# Patient Record
Sex: FEMALE | Race: WHITE | NOT HISPANIC OR LATINO | Employment: OTHER | URBAN - METROPOLITAN AREA
[De-identification: names, ages, dates, MRNs, and addresses within clinical notes are randomized per-mention and may not be internally consistent; named-entity substitution may affect disease eponyms.]

---

## 2017-02-22 ENCOUNTER — TRANSFERRED RECORDS (OUTPATIENT)
Dept: HEALTH INFORMATION MANAGEMENT | Facility: CLINIC | Age: 67
End: 2017-02-22

## 2017-12-01 ENCOUNTER — TELEPHONE (OUTPATIENT)
Dept: PEDIATRICS | Facility: CLINIC | Age: 67
End: 2017-12-01

## 2017-12-01 NOTE — TELEPHONE ENCOUNTER
Reason for Call:  Other pt would like Dr Huang as a PCP    Detailed comments: pt  is currently a pt caty [place] and the wife would like to also see her. She has surgery coming up and needs a pre-op.    Phone Number Patient can be reached at: Home number on file [phone] (home)    Best Time: any    Can we leave a detailed message on this number? YES    Call taken on 12/1/2017 at 3:12 PM by Mckenna Cummins

## 2017-12-04 NOTE — TELEPHONE ENCOUNTER
Patient's  see's  for primary care, has not seen . Spoke to pt and informed her, pt verbalized understanding and requested to schedule pre op with open provider. Appt scheduled.     Shayla Conley MA

## 2017-12-05 ENCOUNTER — OFFICE VISIT (OUTPATIENT)
Dept: PEDIATRICS | Facility: CLINIC | Age: 67
End: 2017-12-05
Payer: COMMERCIAL

## 2017-12-05 VITALS
TEMPERATURE: 98.6 F | SYSTOLIC BLOOD PRESSURE: 102 MMHG | HEART RATE: 58 BPM | DIASTOLIC BLOOD PRESSURE: 64 MMHG | BODY MASS INDEX: 23.44 KG/M2 | OXYGEN SATURATION: 99 % | WEIGHT: 140.7 LBS | HEIGHT: 65 IN

## 2017-12-05 DIAGNOSIS — Z01.818 PREOP GENERAL PHYSICAL EXAM: Primary | ICD-10-CM

## 2017-12-05 DIAGNOSIS — C50.919 MALIGNANT NEOPLASM OF FEMALE BREAST, UNSPECIFIED ESTROGEN RECEPTOR STATUS, UNSPECIFIED LATERALITY, UNSPECIFIED SITE OF BREAST (H): ICD-10-CM

## 2017-12-05 DIAGNOSIS — H35.341 MACULAR HOLE OF RIGHT EYE: ICD-10-CM

## 2017-12-05 DIAGNOSIS — I15.9 SECONDARY HYPERTENSION: ICD-10-CM

## 2017-12-05 PROCEDURE — 99214 OFFICE O/P EST MOD 30 MIN: CPT | Performed by: NURSE PRACTITIONER

## 2017-12-05 NOTE — MR AVS SNAPSHOT
After Visit Summary   12/5/2017    Mily Hilliard    MRN: [id number]           Patient Information     Date Of Birth          1950        Visit Information        Provider Department      12/5/2017 7:40 AM Mee Crouch APRN CNP [facility]        Today's Diagnoses     Preop general physical exam    -  1    Malignant neoplasm of female breast, unspecified estrogen receptor status, unspecified laterality, unspecified site of breast (H)        Macular hole of right eye        Secondary hypertension          Care Instructions    -Take only half of the lisinopril medicine day of surgery  -Continue propranolol (whole dose) day of surgery  -Avoid ibuprofen for 1 week before surgery.   Before Your Surgery      Call your surgeon if there is any change in your health. This includes signs of a cold or flu (such as a sore throat, runny nose, cough, rash or fever).    Do not smoke, drink alcohol or take over the counter medicine (unless your surgeon or primary care doctor tells you to) for the 24 hours before and after surgery.    If you take prescribed drugs: Follow your doctor s orders about which medicines to take and which to stop until after surgery.    Eating and drinking prior to surgery: follow the instructions from your surgeon    Take a shower or bath the night before surgery. Use the soap your surgeon gave you to gently clean your skin. If you do not have soap from your surgeon, use your regular soap. Do not shave or scrub the surgery site.  Wear clean pajamas and have clean sheets on your bed.           Follow-ups after your visit        Who to contact     If you have questions or need follow up information about today's clinic visit or your schedule please contact [facility] directly at [phone].  Normal or non-critical lab and imaging results will be communicated to you by MyChart, letter or phone within 4 business days after the clinic has received the  "results. If you do not hear from us within 7 days, please contact the clinic through [employer] or phone. If you have a critical or abnormal lab result, we will notify you by phone as soon as possible.  Submit refill requests through [employer] or call your pharmacy and they will forward the refill request to us. Please allow 3 business days for your refill to be completed.          Additional Information About Your Visit        [employer] Information     [employer] lets you send messages to your doctor, view your test results, renew your prescriptions, schedule appointments and more. To sign up, go to [email or web address] . Click on \"Log in\" on the left side of the screen, which will take you to the Welcome page. Then click on \"Sign up Now\" on the right side of the page.     You will be asked to enter the access code listed below, as well as some personal information. Please follow the directions to create your username and password.     Your access code is: [email or web address]  Expires: 3/5/2018  8:33 AM     Your access code will  in 90 days. If you need help or a new code, please call your [place] clinic or [phone].        Care EveryWhere ID     This is your Care EveryWhere ID. This could be used by other organizations to access your [place] medical records  [id number]        Your Vitals Were     Pulse Temperature Height Pulse Oximetry BMI (Body Mass Index)       58 98.6  F (37  C) (Tympanic) 5' 4.75\" (1.645 m) 99% 23.59 kg/m2        Blood Pressure from Last 3 Encounters:   17 102/64   14 139/65   13 178/82    Weight from Last 3 Encounters:   17 140 lb 11.2 oz (63.8 kg)   14 140 lb (63.5 kg)   13 150 lb (68 kg)              Today, you had the following     No orders found for display         Today's Medication Changes          These changes are accurate as of: 17  8:33 AM.  If you have any questions, ask your nurse or doctor.               These medicines have changed or " have updated prescriptions.        Dose/Directions    SYNTHROID PO   This may have changed:  Another medication with the same name was removed. Continue taking this medication, and follow the directions you see here.   Changed by:  Mee Crouch APRN CNP        Dose:  112 mcg   Take 112 mcg by mouth daily   Refills:  0                Primary Care Provider Office Phone # Fax #    Heather Mackey [phone]       [facility] [address]  [facility] [address]        Equal Access to Services     MUNDO ESPINOZA : Hadii aad ku hadasho Soomaali, waaxda luqadaha, qaybta kaalmada adeegyada, waxay idiin hayaan adeeg kharash lajulia . So [facility] [phone].    ATENCIÓN: Si habla español, tiene a lugo disposición servicios gratuitos de asistencia lingüística. [facility] [phone].    We comply with applicable federal civil rights laws and [place] laws. We do not discriminate on the basis of race, color, national origin, age, disability, sex, sexual orientation, or gender identity.            Thank you!     Thank you for choosing [facility]  for your care. Our goal is always to provide you with excellent care. Hearing back from our patients is one way we can continue to improve our services. Please take a few minutes to complete the written survey that you may receive in the mail after your visit with us. Thank you!             Your Updated Medication List - Protect others around you: Learn how to safely use, store and throw away your medicines at www.disposemymeds.org.          This list is accurate as of: 12/5/17  8:33 AM.  Always use your most recent med list.                   Brand Name Dispense Instructions for use Diagnosis    lisinopril-hydrochlorothiazide 10-12.5 MG per tablet    PRINZIDE/ZESTORETIC     Take 1 tablet by mouth daily 10-12.5mg        MULTIVITAMIN PO           PROPRANOLOL HCL PO      Take 60 mg by mouth daily        SYNTHROID PO      Take 112 mcg by mouth daily

## 2017-12-05 NOTE — NURSING NOTE
"Chief Complaint   Patient presents with     Pre-Op Exam       Initial /64  Pulse 58  Temp 98.6  F (37  C) (Tympanic)  Ht 5' 4.75\" (1.645 m)  Wt 140 lb 11.2 oz (63.8 kg)  SpO2 99%  BMI 23.59 kg/m2 Estimated body mass index is 23.59 kg/(m^2) as calculated from the following:    Height as of this encounter: 5' 4.75\" (1.645 m).    Weight as of this encounter: 140 lb 11.2 oz (63.8 kg).  Medication Reconciliation: complete   Yael Rodriguez CMA    "

## 2017-12-05 NOTE — PROGRESS NOTES
[facility]  [address]  [address]  [place] [address]  [phone]  Dept: [phone]    PRE-OP EVALUATION:  Today's date: 2017    Mily Hilliard (: 1950) presents for pre-operative evaluation assessment as requested by Dr. Buzz Rojas.  She requires evaluation and anesthesia risk assessment prior to undergoing surgery/procedure for treatment of macular hole - right eye .  Proposed procedure: vitrectomy, membrane peel, ICG dye, right eye    Date of Surgery/ Procedure: 17  Time of Surgery/ Procedure: 10:00am  Hospital/Surgical Facility: [facility]  Fax number for surgical facility: [phone]  Primary Physician: Heather Mackey  Type of Anesthesia Anticipated: to be determined - local    Patient has a Health Care Directive or Living Will:  NO    Preop Questions 2017   1.  Do you have a history of heart attack, stroke, stent, bypass or surgery on an artery in the head, neck, heart or legs? No   2.  Do you ever have any pain or discomfort in your chest? No   3.  Do you have a history of  Heart Failure? No   4.   Are you troubled by shortness of breath when:  walking on a level surface, or up a slight hill, or at night? No   5.  Do you currently have a cold, bronchitis or other respiratory infection? No   6.  Do you have a cough, shortness of breath, or wheezing? No   7.  Do you sometimes get pains in the calves of your legs when you walk? No   8. Do you or anyone in your family have previous history of blood clots? No   9.  Do you or does anyone in your family have a serious bleeding problem such as prolonged bleeding following surgeries or cuts? No   10. Have you ever had problems with anemia or been told to take iron pills? No   11. Have you had any abnormal blood loss such as black, tarry or bloody stools, or abnormal vaginal bleeding? No   12. Have you ever had a blood transfusion? No   13. Have you or any of your relatives ever had  problems with anesthesia? No   14. Do you have sleep apnea, excessive snoring or daytime drowsiness? No   15. Do you have any prosthetic heart valves? No   16. Do you have prosthetic joints? No   17. Is there any chance that you may be pregnant? No           HPI:                                                      Brief HPI related to upcoming procedure: vitrectomy, membrane peel, ICG dye, right eye    Hypothyroidism: Well controlled  Hypertension: Well controlled on propranolol and prinzide  High cholesterol: Not on medicine    MEDICAL HISTORY:                                                    There are no active problems to display for this patient.     Past Medical History:   Diagnosis Date     Breast cancer (H) 2000    right breast     Hypercholesterolemia      Hypertension      Hypothyroidism      Past Surgical History:   Procedure Laterality Date     Bilateral Foot surgery  1976     COLONOSCOPY  12/16/2014    Dr. [facility]     LUMPECTOMY BREAST  2000    Right     Current Outpatient Prescriptions   Medication Sig Dispense Refill     Levothyroxine Sodium (SYNTHROID PO) Take 112 mcg by mouth daily       Multiple Vitamins-Minerals (MULTIVITAMIN PO)        lisinopril-hydrochlorothiazide (PRINZIDE,ZESTORETIC) 10-12.5 MG per tablet Take 1 tablet by mouth daily 10-12.5mg       PROPRANOLOL HCL PO Take 60 mg by mouth daily        [DISCONTINUED] LEVOTHYROXINE SODIUM PO Take  by mouth.       OTC products: none    Allergies   Allergen Reactions     No Known Allergies       Latex Allergy: NO    Social History   Substance Use Topics     Smoking status: Former Smoker     Quit date: 1/1/2003     Smokeless tobacco: Never Used     Alcohol use 7.0 oz/week     14 Standard drinks or equivalent per week      Comment: occ     History   Drug Use No       REVIEW OF SYSTEMS:                                                    Constitutional, neuro, ENT, endocrine, pulmonary, cardiac, gastrointestinal, genitourinary, musculoskeletal,  "integument and psychiatric systems are negative, except as otherwise noted.      EXAM:                                                    /64  Pulse 58  Temp 98.6  F (37  C) (Tympanic)  Ht 5' 4.75\" (1.645 m)  Wt 140 lb 11.2 oz (63.8 kg)  SpO2 99%  BMI 23.59 kg/m2    GENERAL APPEARANCE: healthy, alert and no distress     EYES: EOMI, PERRL     HENT: ear canals and TM's normal and nose and mouth without ulcers or lesions     NECK: no adenopathy, no asymmetry, masses, or scars and thyroid normal to palpation     RESP: lungs clear to auscultation - no rales, rhonchi or wheezes     CV: regular rates and rhythm, normal S1 S2, no S3 or S4 and no murmur, click or rub     ABDOMEN:  soft, nontender, no HSM or masses and bowel sounds normal     MS: extremities normal- no gross deformities noted, no evidence of inflammation in joints, FROM in all extremities.     SKIN: no suspicious lesions or rashes     NEURO: Normal strength and tone, sensory exam grossly normal, mentation intact and speech normal     PSYCH: mentation appears normal. and affect normal/bright     LYMPHATICS: No axillary, cervical, or supraclavicular nodes    DIAGNOSTICS:                                                      Unresulted Labs Ordered in the Past 30 Days of this Admission     No orders found from 10/6/2017 to 12/6/2017.          Recent Labs   Lab Test  05/09/13   1700   HGB  13.9   PLT  225   NA  139   POTASSIUM  4.2   CR  0.81        IMPRESSION:                                                    Reason for surgery/procedure: vitrectomy, membrane peel, ICG dye, right eye      The proposed surgical procedure is considered LOW risk.    REVISED CARDIAC RISK INDEX  The patient has the following serious cardiovascular risks for perioperative complications such as (MI, PE, VFib and 3  AV Block):  No serious cardiac risks    The patient has the following additional risks for perioperative complications:  No identified additional risks      " ICD-10-CM    1. Preop general physical exam Z01.818    2. Malignant neoplasm of female breast, unspecified estrogen receptor status, unspecified laterality, unspecified site of breast (H) C50.919    3. Macular hole of right eye H35.341    4. Secondary hypertension I15.9        RECOMMENDATIONS:                                                      --Patient is to take all scheduled medications on the day of surgery EXCEPT for modifications listed below:     APPROVAL GIVEN to proceed with proposed procedure, without further diagnostic evaluation       Signed Electronically by: PAOLA Zavala CNP    Copy of this evaluation report is provided to requesting physician.    [facility] Preop Guidelines

## 2017-12-05 NOTE — PATIENT INSTRUCTIONS
-Take only half of the lisinopril medicine day of surgery  -Continue propranolol (whole dose) day of surgery  -Avoid ibuprofen for 1 week before surgery.   Before Your Surgery      Call your surgeon if there is any change in your health. This includes signs of a cold or flu (such as a sore throat, runny nose, cough, rash or fever).    Do not smoke, drink alcohol or take over the counter medicine (unless your surgeon or primary care doctor tells you to) for the 24 hours before and after surgery.    If you take prescribed drugs: Follow your doctor s orders about which medicines to take and which to stop until after surgery.    Eating and drinking prior to surgery: follow the instructions from your surgeon    Take a shower or bath the night before surgery. Use the soap your surgeon gave you to gently clean your skin. If you do not have soap from your surgeon, use your regular soap. Do not shave or scrub the surgery site.  Wear clean pajamas and have clean sheets on your bed.

## 2018-01-08 ENCOUNTER — TELEPHONE (OUTPATIENT)
Dept: PEDIATRICS | Facility: CLINIC | Age: 68
End: 2018-01-08

## 2018-01-08 NOTE — TELEPHONE ENCOUNTER
Panel Management Review      Patient has the following on her problem list:     Hypertension   Last three blood pressure readings:  BP Readings from Last 3 Encounters:   12/05/17 102/64   12/16/14 139/65   05/09/13 178/82     Blood pressure: Passed    HTN Guidelines:  Age 18-59 BP range:  Less than 140/90  Age 60-85 with Diabetes:  Less than 140/90  Age 60-85 without Diabetes:  less than 150/90        Composite cancer screening  Chart review shows that this patient is due/due soon for the following Mammogram  Summary:    Patient is due/failing the following:   LDL, MAMMOGRAM and PHYSICAL    Action needed:   Patient needs office visit for physical, mammogram, cholesterol check.    Type of outreach:    Phone, spoke to patient.  Patient states last had mammo done [facility] in May 2017 - normal. Patient states PCP is [facility]. Goes there for physicals.    Questions for provider review:    None                                                                                   Yael Rodriguez CMA    Chart closed .

## 2018-03-05 ENCOUNTER — TRANSFERRED RECORDS (OUTPATIENT)
Dept: HEALTH INFORMATION MANAGEMENT | Facility: CLINIC | Age: 68
End: 2018-03-05

## 2018-03-26 ENCOUNTER — RADIANT APPOINTMENT (OUTPATIENT)
Dept: MAMMOGRAPHY | Facility: CLINIC | Age: 68
End: 2018-03-26
Attending: INTERNAL MEDICINE
Payer: COMMERCIAL

## 2018-03-26 DIAGNOSIS — Z12.31 VISIT FOR SCREENING MAMMOGRAM: ICD-10-CM

## 2018-03-26 PROCEDURE — 77067 SCR MAMMO BI INCL CAD: CPT | Mod: TC

## 2018-05-23 ENCOUNTER — OFFICE VISIT (OUTPATIENT)
Dept: PEDIATRICS | Facility: CLINIC | Age: 68
End: 2018-05-23
Payer: COMMERCIAL

## 2018-05-23 VITALS
DIASTOLIC BLOOD PRESSURE: 82 MMHG | TEMPERATURE: 98.4 F | HEIGHT: 65 IN | SYSTOLIC BLOOD PRESSURE: 138 MMHG | BODY MASS INDEX: 23.51 KG/M2 | HEART RATE: 56 BPM | WEIGHT: 141.1 LBS

## 2018-05-23 DIAGNOSIS — R79.89 ELEVATED LIVER FUNCTION TESTS: ICD-10-CM

## 2018-05-23 DIAGNOSIS — E06.3 HYPOTHYROIDISM DUE TO HASHIMOTO'S THYROIDITIS: ICD-10-CM

## 2018-05-23 DIAGNOSIS — Z78.0 ASYMPTOMATIC POSTMENOPAUSAL STATUS: ICD-10-CM

## 2018-05-23 DIAGNOSIS — Z85.3 PERSONAL HISTORY OF MALIGNANT NEOPLASM OF BREAST: ICD-10-CM

## 2018-05-23 DIAGNOSIS — I10 ESSENTIAL HYPERTENSION: ICD-10-CM

## 2018-05-23 DIAGNOSIS — Z00.00 ROUTINE GENERAL MEDICAL EXAMINATION AT A HEALTH CARE FACILITY: Primary | ICD-10-CM

## 2018-05-23 PROCEDURE — 99213 OFFICE O/P EST LOW 20 MIN: CPT | Mod: 25 | Performed by: INTERNAL MEDICINE

## 2018-05-23 PROCEDURE — 84439 ASSAY OF FREE THYROXINE: CPT | Performed by: INTERNAL MEDICINE

## 2018-05-23 PROCEDURE — 99397 PER PM REEVAL EST PAT 65+ YR: CPT | Performed by: INTERNAL MEDICINE

## 2018-05-23 PROCEDURE — 36415 COLL VENOUS BLD VENIPUNCTURE: CPT | Performed by: INTERNAL MEDICINE

## 2018-05-23 PROCEDURE — 80053 COMPREHEN METABOLIC PANEL: CPT | Performed by: INTERNAL MEDICINE

## 2018-05-23 PROCEDURE — 84443 ASSAY THYROID STIM HORMONE: CPT | Performed by: INTERNAL MEDICINE

## 2018-05-23 RX ORDER — PROPRANOLOL HCL 60 MG
60 CAPSULE, EXTENDED RELEASE 24HR ORAL DAILY
Qty: 90 CAPSULE | Refills: 3 | Status: SHIPPED | OUTPATIENT
Start: 2018-05-23 | End: 2019-04-04

## 2018-05-23 RX ORDER — LISINOPRIL/HYDROCHLOROTHIAZIDE 10-12.5 MG
1 TABLET ORAL DAILY
Qty: 90 TABLET | Refills: 3 | Status: SHIPPED | OUTPATIENT
Start: 2018-05-23 | End: 2019-05-31

## 2018-05-23 ASSESSMENT — ACTIVITIES OF DAILY LIVING (ADL): CURRENT_FUNCTION: NO ASSISTANCE NEEDED

## 2018-05-23 NOTE — PROGRESS NOTES
SUBJECTIVE:   Mily Hilliard is a 67 year old female who presents for Preventive Visit.    Are you in the first 12 months of your Medicare coverage?  No    Physical   Annual:     Getting at least 3 servings of Calcium per day::  NO    Bi-annual eye exam::  Yes    Dental care twice a year::  Yes    Sleep apnea or symptoms of sleep apnea::  None    Diet::  Low salt and Low fat/cholesterol    Frequency of exercise::  6-7 days/week    Duration of exercise::  30-45 minutes    Taking medications regularly::  Yes    Medication side effects::  None    Additional concerns today::  No    Ability to successfully perform activities of daily living: no assistance needed  Home Safety:  No safety concerns identified  Hearing Impairment: no hearing concerns    Fall risk:  Fallen 2 or more times in the past year?: No  Any fall with injury in the past year?: No    COGNITIVE SCREEN  1) Repeat 3 items (Banana, Sunrise, Chair)    2) Clock draw: NORMAL  3) 3 item recall: Recalls 2 objects   Results: NORMAL clock, 1-2 items recalled: COGNITIVE IMPAIRMENT LESS LIKELY    Mini-CogTM Copyright RAFIA Mckinnon. Licensed by the author for use in [facility] [employer]; reprinted with permission (samuel@[facility]). All rights reserved.      Reviewed and updated as needed this visit by clinical staff  Tobacco  Allergies  Meds  Problems  Med Hx  Surg Hx  Fam Hx  Soc Hx          Reviewed and updated as needed this visit by Provider  Allergies  Meds  Problems        Social History   Substance Use Topics     Smoking status: Former Smoker     Quit date: 1/1/2003     Smokeless tobacco: Never Used     Alcohol use 7.0 oz/week     14 Standard drinks or equivalent per week      Comment: occ       No flowsheet data found.No flowsheet data found.    Hyperlipidemia Follow-Up      Rate your low fat/cholesterol diet?: good    Taking statin?  No    Other lipid medications/supplements?:  none    Hypertension Follow-up      Outpatient blood pressures are being  checked at home.  Results are 120-130/70.    Low Salt Diet: no added salt    Diagnosed within the last 10 years. Takes propranolol in the am and lisinopril/HCTZ at night. Up once a night to use the bathroom. No cough.     Hypothyroidism Follow-up    Since last visit, patient describes the following symptoms: Weight stable, no hair loss, no skin changes, no constipation, no loose stools    Diagnosed in 20's. Dose has been stable.     Osteopenia: takes 600 calcium once a day, 1000 units of vitamin D once a day. Tries to eat leafy green vegetables.     Today's PHQ-2 Score:   PHQ-2 ( 1999 Pfizer) 5/23/2018   Q1: Little interest or pleasure in doing things 0   Q2: Feeling down, depressed or hopeless 0   PHQ-2 Score 0     Do you feel safe in your environment - Yes    Do you have a Health Care Directive?: No: Advance care planning was reviewed with patient; patient declined at this time.    Current providers sharing in care for this patient include:   Patient Care Team:  Jessica Acevedo MD as PCP - General (Internal Medicine)    The following health maintenance items are reviewed in Epic and correct as of today:  Health Maintenance   Topic Date Due     HEPATITIS C SCREENING  06/25/1967     LIPID SCREEN Q5 YR FEMALE (SYSTEM ASSIGNED)  06/25/1994     ADVANCE DIRECTIVE PLANNING Q5 YRS  06/25/2004     FALL RISK ASSESSMENT  06/25/2014     DEXA SCAN SCREENING (SYSTEM ASSIGNED)  06/25/2014     PNEUMOCOCCAL (2 of 2 - PCV13) 06/01/2018     MAMMO Q1 YR  03/26/2019     COLONOSCOPY Q5 YR  12/16/2019     TETANUS IMMUNIZATION (SYSTEM ASSIGNED)  04/14/2023     INFLUENZA VACCINE  Completed     Patient Active Problem List   Diagnosis     Hypothyroidism     Hypertension     Hypercholesterolemia     Breast cancer (H)     Past Surgical History:   Procedure Laterality Date     Bilateral Foot surgery  1976     COLONOSCOPY  12/16/2014    Dr. Naylor [facility]     EYE SURGERY      cataract removal, macular hole repair     LUMPECTOMY BREAST   "2000    Right       Social History   Substance Use Topics     Smoking status: Former Smoker     Quit date: 1/1/2003     Smokeless tobacco: Never Used     Alcohol use 7.0 oz/week     14 Standard drinks or equivalent per week      Comment: occ     Family History   Problem Relation Age of Onset     Cancer - colorectal Mother      at age 94     Breast Cancer Mother      DIABETES Sister      pre-diabetic     Breast Cancer Sister      Macular Degeneration Father      Bladder Cancer Father      Stomach Cancer Maternal Grandfather            Review of Systems  Constitutional, HEENT, cardiovascular, pulmonary, GI, , musculoskeletal, neuro, skin, endocrine and psych systems are negative, except as otherwise noted.    OBJECTIVE:   /82  Pulse 56  Temp 98.4  F (36.9  C) (Tympanic)  Ht 5' 5\" (1.651 m)  Wt 141 lb 1.6 oz (64 kg)  BMI 23.48 kg/m2 Estimated body mass index is 23.48 kg/(m^2) as calculated from the following:    Height as of this encounter: 5' 5\" (1.651 m).    Weight as of this encounter: 141 lb 1.6 oz (64 kg).  Physical Exam  GENERAL: healthy, alert and no distress  EYES: Eyes grossly normal to inspection, PERRL and conjunctivae and sclerae normal  HENT: ear canals and TM's normal, nose and mouth without ulcers or lesions  NECK: no adenopathy, no asymmetry, masses, or scars and thyroid normal to palpation  RESP: lungs clear to auscultation - no rales, rhonchi or wheezes  BREAST: deferred to recent mammogram  CV: regular rate and rhythm, normal S1 S2, no S3 or S4, no murmur, click or rub, no peripheral edema and peripheral pulses strong  ABDOMEN: soft, nontender, no hepatosplenomegaly, no masses and bowel sounds normal  MS: no gross musculoskeletal defects noted, no edema  SKIN: no suspicious lesions or rashes  NEURO: Normal strength and tone, mentation intact and speech normal. Small tremor of left > right hand  PSYCH: mentation appears normal, affect normal/bright    Results for orders placed or " performed in visit on 05/23/18   TSH   Result Value Ref Range    TSH 0.12 (L) 0.40 - 4.00 mU/L   T4, free   Result Value Ref Range    T4 Free 1.30 0.76 - 1.46 ng/dL   Comprehensive metabolic panel   Result Value Ref Range    Sodium 137 133 - 144 mmol/L    Potassium 5.1 3.4 - 5.3 mmol/L    Chloride 103 94 - 109 mmol/L    Carbon Dioxide 23 20 - 32 mmol/L    Anion Gap 11 3 - 14 mmol/L    Glucose 91 70 - 99 mg/dL    Urea Nitrogen 15 7 - 30 mg/dL    Creatinine 0.80 0.52 - 1.04 mg/dL    GFR Estimate 71 >60 mL/min/1.7m2    GFR Estimate If Black 86 >60 mL/min/1.7m2    Calcium 9.5 8.5 - 10.1 mg/dL    Bilirubin Total 0.6 0.2 - 1.3 mg/dL    Albumin 4.0 3.4 - 5.0 g/dL    Protein Total 7.4 6.8 - 8.8 g/dL    Alkaline Phosphatase 51 40 - 150 U/L    ALT 53 (H) 0 - 50 U/L    AST 49 (H) 0 - 45 U/L       ASSESSMENT / PLAN:   1. Routine general medical examination at a health care facility  Mammo, colonoscopy UTD  Tdap, pneumovax UTD. Will obtain Prevnar after 6/1. Discussed Shingrix vaccine  Will schedule DEXA   Had [facility] with [facility] in the fall - will get records    2. Hypothyroidism due to Hashimoto's thyroiditis  TSH slightly off, but free T4 normal. Continue levothyroxine at 112 mcg a day. Recheck in 6 months.  - TSH  - T4, free  - TSH; Future  - T4, free; Future  - levothyroxine (SYNTHROID) 112 MCG tablet; Take 1 tablet (112 mcg) by mouth daily  Dispense: 90 tablet; Refill: 1    3. Essential hypertension  Controlled. Continue propranolol (also has essential tremor) and lisinopril/HCTZ  - Comprehensive metabolic panel  - propranolol (INDERAL LA) 60 MG 24 hr capsule; Take 1 capsule (60 mg) by mouth daily  Dispense: 90 capsule; Refill: 3  - lisinopril-hydrochlorothiazide (PRINZIDE/ZESTORETIC) 10-12.5 MG per tablet; Take 1 tablet by mouth daily 10-12.5mg  Dispense: 90 tablet; Refill: 3    4. Elevated liver function tests  Mildly elevated. Recheck in 6 months.  - Hepatic panel; Future    5. Personal history of malignant  "neoplasm of breast  Remote history. Continue yearly mammo.    6. Asymptomatic postmenopausal status  Getting adequate calcium/D. Recheck levels.  - DEXA HIP/PELVIS/SPINE - Future; Future    End of Life Planning:  Patient currently has an advanced directive: No.  I have verified the patient's ablity to prepare an advanced directive/make health care decisions.  Literature was provided to assist patient in preparing an advanced directive.    COUNSELING:  Reviewed preventive health counseling, as reflected in patient instructions  Special attention given to:       Regular exercise       Healthy diet/nutrition       Osteoporosis Prevention/Bone Health       Colon cancer screening       Hepatitis C screening        Estimated body mass index is 23.48 kg/(m^2) as calculated from the following:    Height as of this encounter: 5' 5\" (1.651 m).    Weight as of this encounter: 141 lb 1.6 oz (64 kg).     reports that she quit smoking about 15 years ago. She has never used smokeless tobacco.      Appropriate preventive services were discussed with this patient, including applicable screening as appropriate for cardiovascular disease, diabetes, osteopenia/osteoporosis, and glaucoma.  As appropriate for age/gender, discussed screening for colorectal cancer, prostate cancer, breast cancer, and cervical cancer. Checklist reviewing preventive services available has been given to the patient.    Reviewed patients plan of care and provided an AVS. The Basic Care Plan (routine screening as documented in Health Maintenance) for Mily meets the Care Plan requirement. This Care Plan has been established and reviewed with the Patient.    Counseling Resources:  ATP IV Guidelines  Pooled Cohorts Equation Calculator  Breast Cancer Risk Calculator  FRAX Risk Assessment  ICSI Preventive Guidelines  Dietary Guidelines for Americans, 2010  USDA's MyPlate  ASA Prophylaxis  Lung CA Screening    Jessica Acevedo MD  [facility] [place]  "

## 2018-05-23 NOTE — PATIENT INSTRUCTIONS
Preventive Health Recommendations    Female Ages 65 +    Yearly exam:     See your health care provider every year in order to  o Review health changes.   o Discuss preventive care.    o Review your medicines if your doctor has prescribed any.      You no longer need a yearly Pap test unless you've had an abnormal Pap test in the past 10 years. If you have vaginal symptoms, such as bleeding or discharge, be sure to talk with your provider about a Pap test.      Every 1 to 2 years, have a mammogram.  If you are over 69, talk with your health care provider about whether or not you want to continue having screening mammograms.      Every 10 years, have a colonoscopy. Or, have a yearly FIT test (stool test). These exams will check for colon cancer.       Have a cholesterol test every 5 years, or more often if your doctor advises it.       Have a diabetes test (fasting glucose) every three years. If you are at risk for diabetes, you should have this test more often.       At age 65, have a bone density scan (DEXA) to check for osteoporosis (brittle bone disease).    Shots:    Get a flu shot each year.    Get a tetanus shot every 10 years.    Talk to your doctor about your pneumonia vaccines. There are now two you should receive - Pneumovax (PPSV 23) and Prevnar (PCV 13).    Talk to your doctor about the shingles vaccine.    Talk to your doctor about the hepatitis B vaccine.    Nutrition:     Eat at least 5 servings of fruits and vegetables each day.      Eat whole-grain bread, whole-wheat pasta and brown rice instead of white grains and rice.      Talk to your provider about Calcium and Vitamin D.     Lifestyle    Exercise at least 150 minutes a week (30 minutes a day, 5 days a week). This will help you control your weight and prevent disease.      Limit alcohol to one drink per day.      No smoking.       Wear sunscreen to prevent skin cancer.       See your dentist twice a year for an exam and cleaning.      See your  eye doctor every 1 to 2 years to screen for conditions such as glaucoma, macular degeneration and cataracts.    ---------------  1. Labs today: thyroid function, electrolytes, liver and kidney function  2. Schedule bone density scan - [phone]  3. Prevnar pneumonia vaccine after 6/1 - can schedule as a nurse visit or could have done at the same time as the Shingrix vaccine  4. Refilled blood pressure medications, will send thyroid once labs are back  5. Will get records from [facility]  6. Follow-up in 1 year

## 2018-05-23 NOTE — MR AVS SNAPSHOT
After Visit Summary   5/23/2018    Mily Hilliard    MRN: [id number]           Patient Information     Date Of Birth          1950        Visit Information        Provider Department      5/23/2018 10:40 AM Jessica Acevedo MD [facility] [place]        Today's Diagnoses     Routine general medical examination at a health care facility    -  1    Hypothyroidism due to Hashimoto's thyroiditis        Essential hypertension        Asymptomatic postmenopausal status        Need for hepatitis C screening test        Need for prophylactic vaccination against Streptococcus pneumoniae (pneumococcus)          Care Instructions      Preventive Health Recommendations    Female Ages 65 +    Yearly exam:     See your health care provider every year in order to  o Review health changes.   o Discuss preventive care.    o Review your medicines if your doctor has prescribed any.      You no longer need a yearly Pap test unless you've had an abnormal Pap test in the past 10 years. If you have vaginal symptoms, such as bleeding or discharge, be sure to talk with your provider about a Pap test.      Every 1 to 2 years, have a mammogram.  If you are over 69, talk with your health care provider about whether or not you want to continue having screening mammograms.      Every 10 years, have a colonoscopy. Or, have a yearly FIT test (stool test). These exams will check for colon cancer.       Have a cholesterol test every 5 years, or more often if your doctor advises it.       Have a diabetes test (fasting glucose) every three years. If you are at risk for diabetes, you should have this test more often.       At age 65, have a bone density scan (DEXA) to check for osteoporosis (brittle bone disease).    Shots:    Get a flu shot each year.    Get a tetanus shot every 10 years.    Talk to your doctor about your pneumonia vaccines. There are now two you should receive - Pneumovax (PPSV 23) and Prevnar (PCV  13).    Talk to your doctor about the shingles vaccine.    Talk to your doctor about the hepatitis B vaccine.    Nutrition:     Eat at least 5 servings of fruits and vegetables each day.      Eat whole-grain bread, whole-wheat pasta and brown rice instead of white grains and rice.      Talk to your provider about Calcium and Vitamin D.     Lifestyle    Exercise at least 150 minutes a week (30 minutes a day, 5 days a week). This will help you control your weight and prevent disease.      Limit alcohol to one drink per day.      No smoking.       Wear sunscreen to prevent skin cancer.       See your dentist twice a year for an exam and cleaning.      See your eye doctor every 1 to 2 years to screen for conditions such as glaucoma, macular degeneration and cataracts.    ---------------  1. Labs today: thyroid function, electrolytes, liver and kidney function  2. Schedule bone density scan - [phone]  3. Prevnar pneumonia vaccine after 6/1 - can schedule as a nurse visit or could have done at the same time as the Shingrix vaccine  4. Refilled blood pressure medications, will send thyroid once labs are back  5. Will get records from [facility]  6. Follow-up in 1 year          Follow-ups after your visit        Follow-up notes from your care team     Return in about 1 year (around 5/23/2019).      Future tests that were ordered for you today     Open Future Orders        Priority Expected Expires Ordered    DEXA HIP/PELVIS/SPINE - Future Routine  5/23/2019 5/23/2018            Who to contact     If you have questions or need follow up information about today's clinic visit or your schedule please contact [facility] [place] directly at [phone].  Normal or non-critical lab and imaging results will be communicated to you by MyChart, letter or phone within 4 business days after the clinic has received the results. If you do not hear from us within 7 days, please contact the clinic through [employer]  "or phone. If you have a critical or abnormal lab result, we will notify you by phone as soon as possible.  Submit refill requests through [employer] or call your pharmacy and they will forward the refill request to us. Please allow 3 business days for your refill to be completed.          Additional Information About Your Visit        Dialshart Information     [employer] gives you secure access to your electronic health record. If you see a primary care provider, you can also send messages to your care team and make appointments. If you have questions, please call your primary care clinic.  If you do not have a primary care provider, please call [phone] and they will assist you.        Care EveryWhere ID     This is your Care EveryWhere ID. This could be used by other organizations to access your [facility] medical records  [id number]        Your Vitals Were     Pulse Temperature Height BMI (Body Mass Index)          56 98.4  F (36.9  C) (Tympanic) 5' 5\" (1.651 m) 23.48 kg/m2         Blood Pressure from Last 3 Encounters:   05/23/18 138/82   12/05/17 102/64   12/16/14 139/65    Weight from Last 3 Encounters:   05/23/18 141 lb 1.6 oz (64 kg)   12/05/17 140 lb 11.2 oz (63.8 kg)   12/16/14 140 lb (63.5 kg)              We Performed the Following     Comprehensive metabolic panel     T4, free     TSH          Today's Medication Changes          These changes are accurate as of 5/23/18 11:22 AM.  If you have any questions, ask your nurse or doctor.               Start taking these medicines.        Dose/Directions    propranolol 60 MG 24 hr capsule   Commonly known as:  INDERAL LA   Used for:  Essential hypertension   Replaces:  PROPRANOLOL HCL PO   Started by:  Jessica Acevedo MD        Dose:  60 mg   Take 1 capsule (60 mg) by mouth daily   Quantity:  90 capsule   Refills:  3         Stop taking these medicines if you haven't already. Please contact your care team if you have questions.     PROPRANOLOL HCL PO "   Replaced by:  propranolol 60 MG 24 hr capsule   Stopped by:  Jessica Acevedo MD                Where to get your medicines      These medications were sent to [facility]/pharmacy #[id number] - [place], [place] - [address] [place] [address] [address]     Phone:  [phone]     lisinopril-hydrochlorothiazide 10-12.5 MG per tablet    propranolol 60 MG 24 hr capsule                Primary Care Provider Office Phone # Fax #    Mee DumontPAOLA silva -[phone] [address] [place] [address]        Equal Access to Services     [facility]: Hadii aad ku hadasho Soomaali, waaxda luqadaha, qaybta kaalmada adeegyada, umer parham . So [facility] [phone].    ATENCIÓN: Si habla español, tiene a lugo disposición servicios gratuitos de asistencia lingüística. [facility] [phone].    We comply with applicable federal civil rights laws and [place] laws. We do not discriminate on the basis of race, color, national origin, age, disability, sex, sexual orientation, or gender identity.            Thank you!     Thank you for choosing [facility]  for your care. Our goal is always to provide you with excellent care. Hearing back from our patients is one way we can continue to improve our services. Please take a few minutes to complete the written survey that you may receive in the mail after your visit with us. Thank you!             Your Updated Medication List - Protect others around you: Learn how to safely use, store and throw away your medicines at www.disposemymeds.org.          This list is accurate as of 5/23/18 11:22 AM.  Always use your most recent med list.                   Brand Name Dispense Instructions for use Diagnosis    lisinopril-hydrochlorothiazide 10-12.5 MG per tablet    PRINZIDE/ZESTORETIC    90 tablet    Take 1 tablet by mouth daily 10-12.5mg    Essential hypertension        MULTIVITAMIN PO           propranolol 60 MG 24 hr capsule    INDERAL LA    90 capsule    Take 1 capsule (60 mg) by mouth daily    Essential hypertension       SYNTHROID PO      Take 112 mcg by mouth daily

## 2018-05-24 PROBLEM — G25.0 ESSENTIAL TREMOR: Status: ACTIVE | Noted: 2018-05-24

## 2018-05-24 LAB
ALBUMIN SERPL-MCNC: 4 G/DL (ref 3.4–5)
ALP SERPL-CCNC: 51 U/L (ref 40–150)
ALT SERPL W P-5'-P-CCNC: 53 U/L (ref 0–50)
ANION GAP SERPL CALCULATED.3IONS-SCNC: 11 MMOL/L (ref 3–14)
AST SERPL W P-5'-P-CCNC: 49 U/L (ref 0–45)
BILIRUB SERPL-MCNC: 0.6 MG/DL (ref 0.2–1.3)
BUN SERPL-MCNC: 15 MG/DL (ref 7–30)
CALCIUM SERPL-MCNC: 9.5 MG/DL (ref 8.5–10.1)
CHLORIDE SERPL-SCNC: 103 MMOL/L (ref 94–109)
CO2 SERPL-SCNC: 23 MMOL/L (ref 20–32)
CREAT SERPL-MCNC: 0.8 MG/DL (ref 0.52–1.04)
GFR SERPL CREATININE-BSD FRML MDRD: 71 ML/MIN/1.7M2
GLUCOSE SERPL-MCNC: 91 MG/DL (ref 70–99)
POTASSIUM SERPL-SCNC: 5.1 MMOL/L (ref 3.4–5.3)
PROT SERPL-MCNC: 7.4 G/DL (ref 6.8–8.8)
SODIUM SERPL-SCNC: 137 MMOL/L (ref 133–144)
T4 FREE SERPL-MCNC: 1.3 NG/DL (ref 0.76–1.46)
TSH SERPL DL<=0.005 MIU/L-ACNC: 0.12 MU/L (ref 0.4–4)

## 2018-05-24 RX ORDER — LEVOTHYROXINE SODIUM 112 UG/1
112 TABLET ORAL DAILY
Qty: 90 TABLET | Refills: 1 | Status: SHIPPED | OUTPATIENT
Start: 2018-05-24 | End: 2018-11-18

## 2018-06-14 ENCOUNTER — RADIANT APPOINTMENT (OUTPATIENT)
Dept: BONE DENSITY | Facility: CLINIC | Age: 68
End: 2018-06-14
Attending: INTERNAL MEDICINE
Payer: COMMERCIAL

## 2018-06-14 DIAGNOSIS — Z78.0 ASYMPTOMATIC POSTMENOPAUSAL STATUS: ICD-10-CM

## 2018-06-14 PROCEDURE — 77080 DXA BONE DENSITY AXIAL: CPT | Performed by: FAMILY MEDICINE

## 2018-07-13 ENCOUNTER — ALLIED HEALTH/NURSE VISIT (OUTPATIENT)
Dept: NURSING | Facility: CLINIC | Age: 68
End: 2018-07-13
Payer: COMMERCIAL

## 2018-07-13 DIAGNOSIS — Z23 NEED FOR VACCINATION: Primary | ICD-10-CM

## 2018-07-13 PROCEDURE — 90670 PCV13 VACCINE IM: CPT

## 2018-07-13 PROCEDURE — G0009 ADMIN PNEUMOCOCCAL VACCINE: HCPCS

## 2018-11-18 DIAGNOSIS — E06.3 HYPOTHYROIDISM DUE TO HASHIMOTO'S THYROIDITIS: ICD-10-CM

## 2018-11-18 NOTE — TELEPHONE ENCOUNTER
"Requested Prescriptions   Pending Prescriptions Disp Refills     SYNTHROID 112 MCG tablet [Pharmacy Med Name: SYNTHROID 112 MCG TABLET]  Last Written Prescription Date:  05/24/2018  Last Fill Quantity: 90 tablet,  # refills: 1   Last office visit: 5/23/2018 with prescribing provider:  Jessica Acevedo MD    Future Office Visit:     90 tablet 1     Sig: TAKE 1 TABLET (112 MCG) BY MOUTH DAILY    Thyroid Protocol Failed    11/18/2018  1:43 AM       Failed - Normal TSH on file in past 12 months    Recent Labs   Lab Test  05/23/18   1126   TSH  0.12*             Passed - Patient is 12 years or older       Passed - Recent (12 mo) or future (30 days) visit within the authorizing provider's specialty    Patient had office visit in the last 12 months or has a visit in the next 30 days with authorizing provider or within the authorizing provider's specialty.  See \"Patient Info\" tab in inbasket, or \"Choose Columns\" in Meds & Orders section of the refill encounter.             Passed - No active pregnancy on record    If patient is pregnant or has had a positive pregnancy test, please check TSH.         Passed - No positive pregnancy test in past 12 months    If patient is pregnant or has had a positive pregnancy test, please check TSH.            "

## 2018-11-21 RX ORDER — LEVOTHYROXINE SODIUM 112 MCG
TABLET ORAL
Qty: 30 TABLET | Refills: 0 | Status: SHIPPED | OUTPATIENT
Start: 2018-11-21 | End: 2018-12-13

## 2018-11-21 NOTE — TELEPHONE ENCOUNTER
Medication is being filled for 1 time refill only due to:  Patient needs labs T4 Free, TSH, Hepatic Panel have been ordered..     Routing to station B to help patient make lab-only appt.    Notes at [facility] 5/23/18: Hypothyroidism due to Hashimoto's thyroiditis  TSH slightly off, but free T4 normal. Continue levothyroxine at 112 mcg a day. Recheck in 6 months.  - TSH  - T4, free  - TSH; Future  - T4, free; Future  - levothyroxine (SYNTHROID) 112 MCG tablet; Take 1 tablet (112 mcg) by mouth daily  Dispense: 90 tablet; Refill: 1    Zamzam Rosales RN

## 2018-12-12 DIAGNOSIS — R79.89 ELEVATED LIVER FUNCTION TESTS: ICD-10-CM

## 2018-12-12 DIAGNOSIS — E06.3 HYPOTHYROIDISM DUE TO HASHIMOTO'S THYROIDITIS: ICD-10-CM

## 2018-12-12 PROCEDURE — 80076 HEPATIC FUNCTION PANEL: CPT | Performed by: INTERNAL MEDICINE

## 2018-12-12 PROCEDURE — 84439 ASSAY OF FREE THYROXINE: CPT | Performed by: INTERNAL MEDICINE

## 2018-12-12 PROCEDURE — 36415 COLL VENOUS BLD VENIPUNCTURE: CPT | Performed by: INTERNAL MEDICINE

## 2018-12-12 PROCEDURE — 84443 ASSAY THYROID STIM HORMONE: CPT | Performed by: INTERNAL MEDICINE

## 2018-12-13 LAB
ALBUMIN SERPL-MCNC: 4 G/DL (ref 3.4–5)
ALP SERPL-CCNC: 45 U/L (ref 40–150)
ALT SERPL W P-5'-P-CCNC: 54 U/L (ref 0–50)
AST SERPL W P-5'-P-CCNC: 44 U/L (ref 0–45)
BILIRUB DIRECT SERPL-MCNC: 0.1 MG/DL (ref 0–0.2)
BILIRUB SERPL-MCNC: 0.4 MG/DL (ref 0.2–1.3)
PROT SERPL-MCNC: 7.2 G/DL (ref 6.8–8.8)
T4 FREE SERPL-MCNC: 1.2 NG/DL (ref 0.76–1.46)
TSH SERPL DL<=0.005 MIU/L-ACNC: 0.49 MU/L (ref 0.4–4)

## 2018-12-13 RX ORDER — LEVOTHYROXINE SODIUM 112 UG/1
TABLET ORAL
Qty: 90 TABLET | Refills: 1 | Status: SHIPPED | OUTPATIENT
Start: 2018-12-13 | End: 2019-05-31

## 2019-03-29 ENCOUNTER — ANCILLARY PROCEDURE (OUTPATIENT)
Dept: MAMMOGRAPHY | Facility: CLINIC | Age: 69
End: 2019-03-29
Attending: INTERNAL MEDICINE
Payer: COMMERCIAL

## 2019-03-29 DIAGNOSIS — Z12.31 VISIT FOR SCREENING MAMMOGRAM: ICD-10-CM

## 2019-03-29 PROCEDURE — 77067 SCR MAMMO BI INCL CAD: CPT | Mod: TC

## 2019-04-04 DIAGNOSIS — I10 ESSENTIAL HYPERTENSION: ICD-10-CM

## 2019-04-04 RX ORDER — PROPRANOLOL HCL 60 MG
CAPSULE, EXTENDED RELEASE 24HR ORAL
Qty: 90 CAPSULE | Refills: 0 | Status: SHIPPED | OUTPATIENT
Start: 2019-04-04 | End: 2019-05-31

## 2019-04-04 NOTE — TELEPHONE ENCOUNTER
Prescription approved per [facility] Refill Protocol.    Ciera Billy RN

## 2019-04-04 NOTE — TELEPHONE ENCOUNTER
"Requested Prescriptions   Pending Prescriptions Disp Refills     propranolol ER (INDERAL LA) 60 MG 24 hr capsule [Pharmacy Med Name: PROPRANOLOL ER 60 MG CAPSULE]  Last Written Prescription Date:  05/23/2018  Last Fill Quantity: 90 capsule,  # refills: 3   Last office visit: 5/23/2018 with prescribing provider:  Jessica Acevedo MD   Future Office Visit:     90 capsule 3     Sig: TAKE 1 CAPSULE BY MOUTH EVERY DAY    Beta-Blockers Protocol Passed - 4/4/2019  1:18 AM       Passed - Blood pressure under 140/90 in past 12 months    BP Readings from Last 3 Encounters:   05/23/18 138/82   12/05/17 102/64   12/16/14 139/65                Passed - Patient is age 6 or older       Passed - Recent (12 mo) or future (30 days) visit within the authorizing provider's specialty    Patient had office visit in the last 12 months or has a visit in the next 30 days with authorizing provider or within the authorizing provider's specialty.  See \"Patient Info\" tab in inbasket, or \"Choose Columns\" in Meds & Orders section of the refill encounter.             Passed - Medication is active on med list          "

## 2019-04-29 ENCOUNTER — OFFICE VISIT (OUTPATIENT)
Dept: PEDIATRICS | Facility: CLINIC | Age: 69
End: 2019-04-29
Payer: COMMERCIAL

## 2019-04-29 VITALS
WEIGHT: 149.7 LBS | RESPIRATION RATE: 18 BRPM | DIASTOLIC BLOOD PRESSURE: 60 MMHG | HEART RATE: 66 BPM | SYSTOLIC BLOOD PRESSURE: 144 MMHG | TEMPERATURE: 97.8 F | BODY MASS INDEX: 26.52 KG/M2 | OXYGEN SATURATION: 98 % | HEIGHT: 63 IN

## 2019-04-29 DIAGNOSIS — J01.90 ACUTE SINUSITIS WITH SYMPTOMS > 10 DAYS: Primary | ICD-10-CM

## 2019-04-29 DIAGNOSIS — I10 ESSENTIAL HYPERTENSION: ICD-10-CM

## 2019-04-29 PROCEDURE — 99214 OFFICE O/P EST MOD 30 MIN: CPT | Performed by: INTERNAL MEDICINE

## 2019-04-29 RX ORDER — DOXYCYCLINE 100 MG/1
100 CAPSULE ORAL 2 TIMES DAILY
Qty: 20 CAPSULE | Refills: 0 | Status: SHIPPED | OUTPATIENT
Start: 2019-04-29 | End: 2019-05-31

## 2019-04-29 RX ORDER — CYANOCOBALAMIN (VITAMIN B-12) 500 MCG
400 LOZENGE ORAL DAILY
COMMUNITY
End: 2022-02-09

## 2019-04-29 RX ORDER — CALCIUM ACETATE 667 MG/1
667 CAPSULE ORAL DAILY
COMMUNITY

## 2019-04-29 RX ORDER — FLUTICASONE PROPIONATE 50 MCG
2 SPRAY, SUSPENSION (ML) NASAL DAILY
Qty: 16 G | Refills: 0 | Status: SHIPPED | OUTPATIENT
Start: 2019-04-29 | End: 2019-05-21

## 2019-04-29 ASSESSMENT — MIFFLIN-ST. JEOR: SCORE: 1177.92

## 2019-04-29 NOTE — PROGRESS NOTES
"  SUBJECTIVE:   Mily Hilliard is a 68 year old female who presents to clinic today for the following   health issues:    RESPIRATORY SYMPTOMS      Duration: Patient states that her symptoms have been bothering her x30 plus days.     Description  nasal congestion, rhinorrhea, facial pain/pressure, cough, ear pain right, headache and fatigue/malaise    Severity: moderate    Accompanying signs and symptoms: None    History (predisposing factors):  none    Precipitating or alleviating factors: None    Therapies tried and outcome:  Antibiotic (Amoxicillin x10 days)     Symptoms started over a month ago with regular cold symptoms, then went to sinus area. Went to [facility] clinic about 2 weeks ago and treated with amoxicillin for 10 days. Improved some, but still having a lot phlegm stuck in the chest area. Gags on the phlegm. No fevers. Some headaches, but overall improved. Sinus pressure on left > right.  No itching symptoms or history of previous allergies. Initially tried coricidin and and mucinex, which did not help. Not taking anything currently.     HTN: taking medications as prescribed. Was mildly elevated in [facility] clinic as well. Has not been checking otherwise. Does have BP cuff at home.    Additional history: as documented    Reviewed  and updated as needed this visit by clinical staff  Tobacco  Allergies  Meds  Problems  Med Hx  Surg Hx  Fam Hx  Soc Hx        Reviewed and updated as needed this visit by Provider  Tobacco  Allergies  Meds  Problems  Med Hx  Surg Hx  Fam Hx       -------------------------------------    ROS:      OBJECTIVE:                                                    /60 (BP Location: Right arm, Patient Position: Sitting, Cuff Size: Adult Regular)   Pulse 66   Temp 97.8  F (36.6  C) (Oral)   Resp 18   Ht 1.608 m (5' 3.3\")   Wt 67.9 kg (149 lb 11.2 oz)   SpO2 98%   BMI 26.27 kg/m     Body mass index is 26.27 kg/m .  General Appearance: tired appearing, alert and " no distress  Eyes:   no discharge, erythema.  Normal pupils.  Both Ears: normal: no effusions, no erythema, normal landmarks  Nose: congested  Sinuses:  maxillary tenderness on left > right  Oropharynx: mild erythema, copious clear PND  Neck: Supple.  No adenopathy, no asymmetry, masses  Respiratory: lungs clear to auscultation - no rales, rhonchi or wheezes.  Cardiovascular: regular rate and rhythm, normal S1 S2, no S3 or S4 and no murmur, click or rub.  No peripheral edema.  Skin: no rashes or lesions.  Well perfused and normal turgor.    Diagnostic Test Results:  none      ASSESSMENT/PLAN:                                                      (J01.90) Acute sinusitis with symptoms > 10 days  (primary encounter diagnosis)  Comment: improved, but still with significant symptoms. Unclear if initially treated with amoxi or augmentin.  Plan: doxycycline hyclate (VIBRAMYCIN) 100 MG         capsule, fluticasone (FLONASE) 50 MCG/ACT nasal        spray  - will start doxy bid for 10 days. Discussed possible side effects  - start flonase  - f/u if not improving over next 1-2 weeks    (I10) Essential hypertension  Comment: not controlled, but currently sick  Plan:   - continue current BP medications  - recommend check a few times before physical next month when at home and feeling better  - may need to increase dose of medication if persistently elevated    FUTURE APPOINTMENTS:       - Follow-up visit in 1 month for physical    [facility]

## 2019-04-29 NOTE — PATIENT INSTRUCTIONS
1. Continue flonase daily.  2. Add pseudoephedrine 30mg tabs, 1 tab every 6 hours until the drainage and ears clear.  3. Albuterol inhaler, 2 puffs every 6 hours until the cough is gone.     Continue cough medication at night.    1. Doxycyline 1 pill twice a day for 10 days  2. Fluticasone (flonase) nasal spray once a day for 2 weeks (ok to use longer if needed)  3. Check blood pressure at home a couple of times before your physical when feeling better

## 2019-05-21 DIAGNOSIS — J01.90 ACUTE SINUSITIS WITH SYMPTOMS > 10 DAYS: ICD-10-CM

## 2019-05-21 NOTE — TELEPHONE ENCOUNTER
"Requested Prescriptions   Pending Prescriptions Disp Refills     fluticasone (FLONASE) 50 MCG/ACT nasal spray [Pharmacy Med Name: FLUTICASONE PROP 50 MCG SPRAY]  Last Written Prescription Date:  4/29/19  Last Fill Quantity: 16 g,  # refills: 0    Last office visit: 4/29/2019 with prescribing provider:  Jessica Acevedo MD       Future Office Visit:     16 mL 0     Sig: SPRAY 2 SPRAYS INTO BOTH NOSTRILS DAILY       Inhaled Steroids Protocol Passed - 5/21/2019 11:38 AM        Passed - Patient is age 12 or older        Passed - Recent (12 mo) or future (30 days) visit within the authorizing provider's specialty     Patient had office visit in the last 12 months or has a visit in the next 30 days with authorizing provider or within the authorizing provider's specialty.  See \"Patient Info\" tab in inbasket, or \"Choose Columns\" in Meds & Orders section of the refill encounter.              Passed - Medication is active on med list          "

## 2019-05-22 RX ORDER — FLUTICASONE PROPIONATE 50 MCG
2 SPRAY, SUSPENSION (ML) NASAL DAILY
Qty: 16 ML | Refills: 0 | Status: SHIPPED | OUTPATIENT
Start: 2019-05-22 | End: 2019-05-31

## 2019-05-31 ENCOUNTER — OFFICE VISIT (OUTPATIENT)
Dept: PEDIATRICS | Facility: CLINIC | Age: 69
End: 2019-05-31
Payer: COMMERCIAL

## 2019-05-31 VITALS
WEIGHT: 143.6 LBS | HEART RATE: 64 BPM | SYSTOLIC BLOOD PRESSURE: 136 MMHG | BODY MASS INDEX: 25.45 KG/M2 | HEIGHT: 63 IN | DIASTOLIC BLOOD PRESSURE: 84 MMHG | TEMPERATURE: 99.6 F

## 2019-05-31 DIAGNOSIS — R79.89 ELEVATED LFTS: ICD-10-CM

## 2019-05-31 DIAGNOSIS — I10 ESSENTIAL HYPERTENSION: ICD-10-CM

## 2019-05-31 DIAGNOSIS — E06.3 HYPOTHYROIDISM DUE TO HASHIMOTO'S THYROIDITIS: ICD-10-CM

## 2019-05-31 DIAGNOSIS — J06.9 VIRAL URI: ICD-10-CM

## 2019-05-31 DIAGNOSIS — G25.0 ESSENTIAL TREMOR: ICD-10-CM

## 2019-05-31 DIAGNOSIS — Z00.00 MEDICARE ANNUAL WELLNESS VISIT, SUBSEQUENT: Primary | ICD-10-CM

## 2019-05-31 DIAGNOSIS — Z13.220 SCREENING FOR HYPERLIPIDEMIA: ICD-10-CM

## 2019-05-31 DIAGNOSIS — R09.82 POST-NASAL DRIP: ICD-10-CM

## 2019-05-31 LAB
ALBUMIN SERPL-MCNC: 4.1 G/DL (ref 3.4–5)
ALP SERPL-CCNC: 70 U/L (ref 40–150)
ALT SERPL W P-5'-P-CCNC: 93 U/L (ref 0–50)
ANION GAP SERPL CALCULATED.3IONS-SCNC: 11 MMOL/L (ref 3–14)
AST SERPL W P-5'-P-CCNC: 83 U/L (ref 0–45)
BILIRUB SERPL-MCNC: 0.5 MG/DL (ref 0.2–1.3)
BUN SERPL-MCNC: 9 MG/DL (ref 7–30)
CALCIUM SERPL-MCNC: 9 MG/DL (ref 8.5–10.1)
CHLORIDE SERPL-SCNC: 97 MMOL/L (ref 94–109)
CHOLEST SERPL-MCNC: 253 MG/DL
CO2 SERPL-SCNC: 23 MMOL/L (ref 20–32)
CREAT SERPL-MCNC: 0.64 MG/DL (ref 0.52–1.04)
GFR SERPL CREATININE-BSD FRML MDRD: >90 ML/MIN/{1.73_M2}
GLUCOSE SERPL-MCNC: 109 MG/DL (ref 70–99)
HDLC SERPL-MCNC: 89 MG/DL
LDLC SERPL CALC-MCNC: 146 MG/DL
NONHDLC SERPL-MCNC: 164 MG/DL
POTASSIUM SERPL-SCNC: 4.3 MMOL/L (ref 3.4–5.3)
PROT SERPL-MCNC: 8 G/DL (ref 6.8–8.8)
SODIUM SERPL-SCNC: 131 MMOL/L (ref 133–144)
T4 FREE SERPL-MCNC: 1.4 NG/DL (ref 0.76–1.46)
TRIGL SERPL-MCNC: 88 MG/DL
TSH SERPL DL<=0.005 MIU/L-ACNC: 0.14 MU/L (ref 0.4–4)

## 2019-05-31 PROCEDURE — 80061 LIPID PANEL: CPT | Performed by: INTERNAL MEDICINE

## 2019-05-31 PROCEDURE — 84443 ASSAY THYROID STIM HORMONE: CPT | Performed by: INTERNAL MEDICINE

## 2019-05-31 PROCEDURE — 99213 OFFICE O/P EST LOW 20 MIN: CPT | Mod: 25 | Performed by: INTERNAL MEDICINE

## 2019-05-31 PROCEDURE — 80053 COMPREHEN METABOLIC PANEL: CPT | Performed by: INTERNAL MEDICINE

## 2019-05-31 PROCEDURE — 36415 COLL VENOUS BLD VENIPUNCTURE: CPT | Performed by: INTERNAL MEDICINE

## 2019-05-31 PROCEDURE — 99397 PER PM REEVAL EST PAT 65+ YR: CPT | Performed by: INTERNAL MEDICINE

## 2019-05-31 PROCEDURE — 84439 ASSAY OF FREE THYROXINE: CPT | Performed by: INTERNAL MEDICINE

## 2019-05-31 RX ORDER — PROPRANOLOL HCL 60 MG
CAPSULE, EXTENDED RELEASE 24HR ORAL
Qty: 90 CAPSULE | Refills: 3 | Status: SHIPPED | OUTPATIENT
Start: 2019-05-31 | End: 2020-06-18

## 2019-05-31 RX ORDER — LEVOTHYROXINE SODIUM 112 UG/1
TABLET ORAL
Qty: 90 TABLET | Refills: 3 | Status: SHIPPED | OUTPATIENT
Start: 2019-05-31 | End: 2020-06-01

## 2019-05-31 RX ORDER — LISINOPRIL/HYDROCHLOROTHIAZIDE 10-12.5 MG
1 TABLET ORAL DAILY
Qty: 90 TABLET | Refills: 3 | Status: SHIPPED | OUTPATIENT
Start: 2019-05-31 | End: 2020-06-18

## 2019-05-31 RX ORDER — FLUTICASONE PROPIONATE 50 MCG
2 SPRAY, SUSPENSION (ML) NASAL DAILY
Qty: 16 ML | Refills: 0 | Status: SHIPPED | OUTPATIENT
Start: 2019-05-31 | End: 2019-08-09

## 2019-05-31 ASSESSMENT — ACTIVITIES OF DAILY LIVING (ADL): CURRENT_FUNCTION: NO ASSISTANCE NEEDED

## 2019-05-31 ASSESSMENT — MIFFLIN-ST. JEOR: SCORE: 1150.26

## 2019-05-31 NOTE — PROGRESS NOTES
"SUBJECTIVE:   Mily Hilliard is a 68 year old female who presents for Preventive Visit.  Are you in the first 12 months of your Medicare coverage?  No    Healthy Habits:    In general, how would you rate your overall health?  Very good    Frequency of exercise:  4-5 days/week    Duration of exercise:  30-45 minutes    Do you usually eat at least 4 servings of fruit and vegetables a day, include whole grains    & fiber and avoid regularly eating high fat or \"junk\" foods?  Yes    Taking medications regularly:  Yes    Barriers to taking medications:  None    Medication side effects:  None    Ability to successfully perform activities of daily living:  No assistance needed    Home Safety:  No safety concerns identified    Hearing Impairment:  No hearing concerns    In the past 6 months, have you been bothered by leaking of urine?  No    In general, how would you rate your overall mental or emotional health?  Very good      PHQ-2 Total Score:    Additional concerns today:  No     Cough: Treated for sinus infection the end of April - improved, but never fully went away. Now feels like in chest for last couple of days. No SOB. No fevers. Not taking anything over the counter other than tylenol. Took flonase for 2 weeks, then stopped taking it. Initially was a dry cough, now getting some brownish type phlegm. Sinus pressure is better. Exposed to 3 grandchildren recently that are sick.    Do you feel safe in your environment? Yes    Do you have a Health Care Directive? Yes: Patient states has Advance Directive and will bring in a copy to clinic.    Fall risk  Fallen 2 or more times in the past year?: No  Any fall with injury in the past year?: No    Cognitive Screening   1) Repeat 3 items (Leader, Season, Table)    2) Clock draw: NORMAL  3) 3 item recall: Recalls 2 objects   Results: NORMAL clock, 1-2 items recalled: COGNITIVE IMPAIRMENT LESS LIKELY    Mini-CogTM Copyright S [employer]. Licensed by the author for use in " [facility]; reprinted with permission ([email or web address]). All rights reserved.      Do you have sleep apnea, excessive snoring or daytime drowsiness?: no    Reviewed and updated as needed this visit by clinical staff  Tobacco  Allergies  Meds  Problems  Med Hx  Surg Hx  Fam Hx  Soc Hx        Reviewed and updated as needed this visit by Provider  Allergies  Meds  Problems        Social History     Tobacco Use     Smoking status: Former Smoker     Last attempt to quit: 2003     Years since quittin.4     Smokeless tobacco: Never Used   Substance Use Topics     Alcohol use: Yes     Alcohol/week: 7.0 oz     Types: 14 Standard drinks or equivalent per week     Comment: occ     If you drink alcohol do you typically have >3 drinks per day or >7 drinks per week? No    No flowsheet data found.      Hyperlipidemia Follow-Up      Are you having any of the following symptoms? (Select all that apply)  No complaints of shortness of breath, chest pain or pressure.  No increased sweating or nausea with activity.  No left-sided neck or arm pain.  No complaints of pain in calves when walking 1-2 blocks.    Are you regularly taking any medication or supplement to lower your cholesterol?   No    Are you having muscle aches or other side effects that you think could be caused by your cholesterol lowering medication?  No    Hypertension Follow-up      Do you check your blood pressure regularly outside of the clinic? Yes     Are you following a low salt diet? Yes    Are your blood pressures ever more than 140 on the top number (systolic) OR more   than 90 on the bottom number (diastolic), for example 140/90? No     Hypothyroidism Follow-up      Since last visit, patient describes the following symptoms: Weight stable, no hair loss, no skin changes, no constipation, no loose stools and tremors      Current providers sharing in care for this patient include:   Patient Care Team:  Jessica Acevedo MD as PCP  - General (Internal Medicine)  Serum, Jessica Rodriguez MD as Assigned PCP    The following health maintenance items are reviewed in Epic and correct as of today:  Health Maintenance   Topic Date Due     HEPATITIS C SCREENING  1950     ANNUAL REVIEW OF HM ORDERS  1950     ADVANCED DIRECTIVE PLANNING  1950     LIPID  1994     FALL RISK ASSESSMENT  2019     MEDICARE ANNUAL WELLNESS VISIT  2019     COLONOSCOPY  2019     MAMMO SCREENING  2020     DEXA  2021     DTAP/TDAP/TD IMMUNIZATION (3 - Td) 2023     PHQ-2  Completed     INFLUENZA VACCINE  Completed     ZOSTER IMMUNIZATION  Completed     IPV IMMUNIZATION  Aged Out     MENINGITIS IMMUNIZATION  Aged Out     Patient Active Problem List   Diagnosis     Hypothyroidism     Hypertension     Hypercholesterolemia     Breast cancer (H)     Essential tremor     Past Surgical History:   Procedure Laterality Date     Bilateral Foot surgery       COLONOSCOPY  2014    Dr[facility]     EYE SURGERY      cataract removal, macular hole repair     LUMPECTOMY BREAST  2000    Right       Social History     Tobacco Use     Smoking status: Former Smoker     Last attempt to quit: 2003     Years since quittin.4     Smokeless tobacco: Never Used   Substance Use Topics     Alcohol use: Yes     Alcohol/week: 7.0 oz     Types: 14 Standard drinks or equivalent per week     Comment: occ     Family History   Problem Relation Age of Onset     Cancer - colorectal Mother         at age 94     Breast Cancer Mother      Diabetes Sister         pre-diabetic     Breast Cancer Sister      Macular Degeneration Father      Bladder Cancer Father      Stomach Cancer Maternal Grandfather          Review of Systems  Constitutional, HEENT, cardiovascular, pulmonary, GI, , musculoskeletal, neuro, skin, endocrine and psych systems are negative, except as otherwise noted.    OBJECTIVE:   /84 (BP Location: Right arm, Patient  "Position: Sitting, Cuff Size: Adult Regular)   Pulse 64   Temp 99.6  F (37.6  C) (Tympanic)   Ht 1.608 m (5' 3.3\")   Wt 65.1 kg (143 lb 9.6 oz)   BMI 25.20 kg/m   Estimated body mass index is 25.2 kg/m  as calculated from the following:    Height as of this encounter: 1.608 m (5' 3.3\").    Weight as of this encounter: 65.1 kg (143 lb 9.6 oz).  Physical Exam  GENERAL: healthy, alert and no distress  EYES: Eyes grossly normal to inspection, PERRL and conjunctivae and sclerae normal  HENT: ear canals and TM's normal, mouth without ulcers or lesions, nasal congestion, clear PND  NECK: no adenopathy, no asymmetry, masses, or scars and thyroid normal to palpation  RESP: lungs clear to auscultation - no rales, rhonchi or wheezes  BREAST: deferred to recent mammo  CV: regular rate and rhythm, normal S1 S2, no S3 or S4, no murmur, click or rub, no peripheral edema and peripheral pulses strong  ABDOMEN: soft, nontender, no hepatosplenomegaly, no masses and bowel sounds normal  MS: no gross musculoskeletal defects noted, no edema  SKIN: no suspicious lesions or rashes  NEURO: Normal strength and tone, mentation intact and speech normal. Essential tremor  PSYCH: mentation appears normal, affect normal/bright    Diagnostic Test Results:  pending    ASSESSMENT / PLAN:   1. Medicare annual wellness visit, subsequent  Mammo, tdap, shingrix and pneumococcal vaccines UTD  Due for colonoscopy in Dec or Jan, will let me know if needs referral    2. Hypothyroidism due to Hashimoto's thyroiditis  Check labs today. Continue levothyroxine.  - levothyroxine (SYNTHROID) 112 MCG tablet; TAKE 1 TABLET (112 MCG) BY MOUTH DAILY  Dispense: 90 tablet; Refill: 3  - TSH  - T4, free    3. Essential hypertension  Controlled. Check lytes today. Continue lisinopril/HCTZ and propranolol.  - lisinopril-hydrochlorothiazide (PRINZIDE/ZESTORETIC) 10-12.5 MG tablet; Take 1 tablet by mouth daily 10-12.5mg  Dispense: 90 tablet; Refill: 3  - propranolol ER " "(INDERAL LA) 60 MG 24 hr capsule; TAKE 1 CAPSULE BY MOUTH EVERY DAY  Dispense: 90 capsule; Refill: 3  - Comprehensive metabolic panel    4. Essential tremor  Controlled. Continue propranolol.  - propranolol ER (INDERAL LA) 60 MG 24 hr capsule; TAKE 1 CAPSULE BY MOUTH EVERY DAY  Dispense: 90 capsule; Refill: 3    5. Viral URI  6. Post-nasal drip  Symptoms most likely due to new exposure, although may also be due to residual post-nasal drip. Recommend restart flonase. Supportive cares. F/u if not improving in next 10-14 days.  - fluticasone (FLONASE) 50 MCG/ACT nasal spray; Spray 2 sprays into both nostrils daily  Dispense: 16 mL; Refill: 0    7. Screening for hyperlipidemia  - Lipid panel reflex to direct LDL Non-fasting    End of Life Planning:  Patient currently has an advanced directive: Yes.  Practitioner is supportive of decision.    COUNSELING:  Reviewed preventive health counseling, as reflected in patient instructions  Special attention given to:       Regular exercise       Healthy diet/nutrition    Estimated body mass index is 25.2 kg/m  as calculated from the following:    Height as of this encounter: 1.608 m (5' 3.3\").    Weight as of this encounter: 65.1 kg (143 lb 9.6 oz).     reports that she quit smoking about 16 years ago. She has never used smokeless tobacco.    Appropriate preventive services were discussed with this patient, including applicable screening as appropriate for cardiovascular disease, diabetes, osteopenia/osteoporosis, and glaucoma.  As appropriate for age/gender, discussed screening for colorectal cancer, prostate cancer, breast cancer, and cervical cancer. Checklist reviewing preventive services available has been given to the patient.    Reviewed patients plan of care and provided an AVS. The Basic Care Plan (routine screening as documented in Health Maintenance) for [place] meets the Care Plan requirement. This Care Plan has been established and reviewed with the " Patient.    Counseling Resources:  ATP IV Guidelines  Pooled Cohorts Equation Calculator  Breast Cancer Risk Calculator  FRAX Risk Assessment  ICSI Preventive Guidelines  Dietary Guidelines for Americans, 2010  USDA's MyPlate  ASA Prophylaxis  Lung CA Screening    [facility]vedo MD  [facility]    Identified Health Risks:

## 2019-05-31 NOTE — PATIENT INSTRUCTIONS
1. Labs today: non-fasting cholesterol, electrolytes, liver function, kidney function, diabetes screen and thyroid function  2. Refilled medications  3. Restart flonase  4. Due for colonoscopy in December or January - let me know if they haven't called and you want me to put in an order as it gets closer    Patient Education   Personalized Prevention Plan  You are due for the preventive services outlined below.  Your care team is available to assist you in scheduling these services.  If you have already completed any of these items, please share that information with your care team to update in your medical record.  Health Maintenance Due   Topic Date Due     ANNUAL REVIEW OF HM ORDERS  1950     Discuss Advance Directive Planning  1950     Cholesterol Lab  06/25/1994     FALL RISK ASSESSMENT  05/23/2019     Annual Wellness Visit  05/23/2019

## 2019-06-06 ENCOUNTER — MYC MEDICAL ADVICE (OUTPATIENT)
Dept: PEDIATRICS | Facility: CLINIC | Age: 69
End: 2019-06-06

## 2019-06-07 ENCOUNTER — E-VISIT (OUTPATIENT)
Dept: PEDIATRICS | Facility: CLINIC | Age: 69
End: 2019-06-07
Payer: COMMERCIAL

## 2019-06-07 DIAGNOSIS — J20.9 ACUTE BRONCHITIS WITH SYMPTOMS > 10 DAYS: Primary | ICD-10-CM

## 2019-06-07 PROCEDURE — 99207 ZZC NO BILLABLE SERVICE THIS VISIT: CPT | Performed by: INTERNAL MEDICINE

## 2019-06-07 RX ORDER — AZITHROMYCIN 250 MG/1
TABLET, FILM COATED ORAL
Qty: 6 TABLET | Refills: 0 | Status: SHIPPED | OUTPATIENT
Start: 2019-06-07 | End: 2019-10-29

## 2019-06-07 NOTE — TELEPHONE ENCOUNTER
SUBJECTIVE:   Mily Hilliard is a 68 year old female who presents for phone visit today for the following health issues:    69 y/o F with h/o hypertension, hypercholesterolemia, hypothyroidism and previous breast ca presents with over a month of cough. Treated for sinusitis 4/29 with doxycyline. Symptoms improved, but never fully went away. Seen again on 5/31 for physical and sounded as if symptoms improved, but worsened after a new exposure. Restarted flonase at that time. Cough is deep, sinuses feels blocked and coughing up a lot of thick mucous that is reddish or brown in color. Unsure if having fevers. Decreased appetite and feeling fatigued. Previous history of smoking.    ASSESSMENT/PLAN:                                                      (J20.9) Acute bronchitis with symptoms > 10 days  (primary encounter diagnosis)  Comment: symptoms not improving. Suspect had secondary infection after initial infection vs partially resistant infection.  Plan: azithromycin (ZITHROMAX) 250 MG tablet  - switch to azithromycin  - continue flonase  - will obtain CXR if not improving in 10-14 days    [facility]

## 2019-10-01 ENCOUNTER — HEALTH MAINTENANCE LETTER (OUTPATIENT)
Age: 69
End: 2019-10-01

## 2019-10-28 DIAGNOSIS — R79.89 ELEVATED LFTS: ICD-10-CM

## 2019-10-28 DIAGNOSIS — E06.3 HYPOTHYROIDISM DUE TO HASHIMOTO'S THYROIDITIS: ICD-10-CM

## 2019-10-28 PROCEDURE — 84443 ASSAY THYROID STIM HORMONE: CPT | Performed by: INTERNAL MEDICINE

## 2019-10-28 PROCEDURE — 80053 COMPREHEN METABOLIC PANEL: CPT | Performed by: INTERNAL MEDICINE

## 2019-10-28 PROCEDURE — 84439 ASSAY OF FREE THYROXINE: CPT | Performed by: INTERNAL MEDICINE

## 2019-10-28 PROCEDURE — 36415 COLL VENOUS BLD VENIPUNCTURE: CPT | Performed by: INTERNAL MEDICINE

## 2019-10-29 LAB
ALBUMIN SERPL-MCNC: 4.1 G/DL (ref 3.4–5)
ALP SERPL-CCNC: 58 U/L (ref 40–150)
ALT SERPL W P-5'-P-CCNC: 51 U/L (ref 0–50)
ANION GAP SERPL CALCULATED.3IONS-SCNC: 7 MMOL/L (ref 3–14)
AST SERPL W P-5'-P-CCNC: 40 U/L (ref 0–45)
BILIRUB SERPL-MCNC: 0.7 MG/DL (ref 0.2–1.3)
BUN SERPL-MCNC: 11 MG/DL (ref 7–30)
CALCIUM SERPL-MCNC: 9.2 MG/DL (ref 8.5–10.1)
CHLORIDE SERPL-SCNC: 97 MMOL/L (ref 94–109)
CO2 SERPL-SCNC: 28 MMOL/L (ref 20–32)
CREAT SERPL-MCNC: 0.7 MG/DL (ref 0.52–1.04)
GFR SERPL CREATININE-BSD FRML MDRD: 87 ML/MIN/{1.73_M2}
GLUCOSE SERPL-MCNC: 104 MG/DL (ref 70–99)
POTASSIUM SERPL-SCNC: 4.5 MMOL/L (ref 3.4–5.3)
PROT SERPL-MCNC: 7.1 G/DL (ref 6.8–8.8)
SODIUM SERPL-SCNC: 132 MMOL/L (ref 133–144)
T4 FREE SERPL-MCNC: 1.13 NG/DL (ref 0.76–1.46)
TSH SERPL DL<=0.005 MIU/L-ACNC: 1.54 MU/L (ref 0.4–4)

## 2019-12-04 ENCOUNTER — HOSPITAL ENCOUNTER (OUTPATIENT)
Facility: CLINIC | Age: 69
Discharge: HOME | End: 2019-12-04
Attending: INTERNAL MEDICINE | Admitting: INTERNAL MEDICINE
Payer: COMMERCIAL

## 2019-12-04 VITALS
RESPIRATION RATE: 16 BRPM | OXYGEN SATURATION: 97 % | DIASTOLIC BLOOD PRESSURE: 83 MMHG | SYSTOLIC BLOOD PRESSURE: 93 MMHG | HEART RATE: 55 BPM

## 2019-12-04 LAB — COLONOSCOPY: NORMAL

## 2019-12-04 PROCEDURE — 25000128 H RX IP 250 OP 636: Performed by: INTERNAL MEDICINE

## 2019-12-04 PROCEDURE — G0500 MOD SEDAT ENDO SERVICE >5YRS: HCPCS | Performed by: INTERNAL MEDICINE

## 2019-12-04 PROCEDURE — 45378 DIAGNOSTIC COLONOSCOPY: CPT | Performed by: INTERNAL MEDICINE

## 2019-12-04 PROCEDURE — G0105 COLORECTAL SCRN; HI RISK IND: HCPCS | Performed by: INTERNAL MEDICINE

## 2019-12-04 RX ORDER — FLUMAZENIL 0.1 MG/ML
0.2 INJECTION, SOLUTION INTRAVENOUS
Status: DISCONTINUED | OUTPATIENT
Start: 2019-12-04 | End: 2019-12-04 | Stop reason: HOSPADM

## 2019-12-04 RX ORDER — NALOXONE HYDROCHLORIDE 0.4 MG/ML
.1-.4 INJECTION, SOLUTION INTRAMUSCULAR; INTRAVENOUS; SUBCUTANEOUS
Status: DISCONTINUED | OUTPATIENT
Start: 2019-12-04 | End: 2019-12-04 | Stop reason: HOSPADM

## 2019-12-04 RX ORDER — ONDANSETRON 2 MG/ML
4 INJECTION INTRAMUSCULAR; INTRAVENOUS EVERY 6 HOURS PRN
Status: DISCONTINUED | OUTPATIENT
Start: 2019-12-04 | End: 2019-12-04 | Stop reason: HOSPADM

## 2019-12-04 RX ORDER — ASCORBIC ACID 500 MG
500 TABLET ORAL DAILY
COMMUNITY
End: 2022-02-09

## 2019-12-04 RX ORDER — ONDANSETRON 2 MG/ML
4 INJECTION INTRAMUSCULAR; INTRAVENOUS
Status: DISCONTINUED | OUTPATIENT
Start: 2019-12-04 | End: 2019-12-04 | Stop reason: HOSPADM

## 2019-12-04 RX ORDER — FENTANYL CITRATE 50 UG/ML
INJECTION, SOLUTION INTRAMUSCULAR; INTRAVENOUS PRN
Status: DISCONTINUED | OUTPATIENT
Start: 2019-12-04 | End: 2019-12-04 | Stop reason: HOSPADM

## 2019-12-04 RX ORDER — ONDANSETRON 4 MG/1
4 TABLET, ORALLY DISINTEGRATING ORAL EVERY 6 HOURS PRN
Status: DISCONTINUED | OUTPATIENT
Start: 2019-12-04 | End: 2019-12-04 | Stop reason: HOSPADM

## 2019-12-04 RX ORDER — LIDOCAINE 40 MG/G
CREAM TOPICAL
Status: DISCONTINUED | OUTPATIENT
Start: 2019-12-04 | End: 2019-12-04 | Stop reason: HOSPADM

## 2019-12-04 NOTE — DISCHARGE INSTRUCTIONS

## 2019-12-04 NOTE — LETTER
November 13, 2019      Mily Hilliard  [address] [place] [address]         Thank you for choosing [facility]. You are scheduled for the following service(s).   Please be aware that coverage of these services is subject to the terms and limitations of your health insurance plan.  Call member services at your health plan with any benefit or coverage questions.    Date:  Wednesday December 4, 2019             Procedure:  COLONOSCOPY  Doctor:        Dr Naylor   Arrival Time:  0830  *Check in at Emergency/Endoscopy desk*  Procedure Time:  0900      Location:   [facility]        Endoscopy Department, First Floor (Enter through ER Doors) *        [address] [place], [place] [address]      [phone] or [phone] ([facility]) to reschedule      MIRALAX -GATORADE  PREP  Colonoscopy is the most accurate test to detect colon polyps and colon cancer; and the only test where polyps can be removed. During this procedure, a doctor examines the lining of your large intestine and rectum through a flexible tube.   Transportation  You must arrange for a ride for the day of your procedure with a responsible adult. A taxi , Uber, etc, is not an option unless you are accompanied by a responsible adult. If you fail to arrange transportation with a responsible adult, your procedure will be cancelled and rescheduled.    Purchase the  following supplies at your local pharmacy:  - 2 (two) bisacodyl tablets: each tablet contains 5 mg.  (Dulcolax  laxative NOT Dulcolax  stool softener)   - 1 (one) 8.3 oz bottle of Polyethylene Glycol (PEG) 3350 Powder   (MiraLAX , Smooth LAX , ClearLAX  or equivalent)  - 64 oz Gatorade    Regular Gatorade, Gatorade G2 , Powerade , Powerade Zero  or Pedialyte  is acceptable. Red colored flavors are not allowed; all other colors (yellow, green, orange, purple and blue) are okay. It is also okay to buy two 2.12 oz packets of powdered  Gatorade that can be mixed with water to a total volume of 64 oz of liquid.  - 1 (one) 10 oz bottle of Magnesium Citrate (Red colored flavors are not allowed)  It is also okay for you to use a 0.5 oz package of powdered magnesium citrate (17 g) mixed with 10 oz of water.      PREPARATION FOR COLONOSCOPY    7 days before:    Discontinue fiber supplements and medications containing iron. This includes Metamucil  and Fibercon ; and multivitamins with iron.    3 days before:    Begin a low-fiber diet. A low-fiber diet helps making the cleanout more effective.     Examples of a low-fiber diet include (but are not limited to): white bread, white rice, pasta, crackers, fish, chicken, eggs, ground beef, creamy peanut butter, cooked/steamed/boiled vegetables, canned fruit, bananas, melons, milk, plain yogurt cheese, salad dressing and other condiments.     The following are not allowed on a low-fiber diet: seeds, nuts, popcorn, bran, whole wheat, corn, quinoa, raw fruits and vegetables, berries and dried fruit, beans and lentils.    For additional details on low-fiber diet, please refer to the table on the last page.    2 days before:    Continue the low-fiber diet.     Drink at least 8 glasses of water throughout the day.     Stop eating solid foods at 11:45 pm.    1 day before:    In the morning: begin a clear liquid diet (liquids you can see through).     Examples of a clear liquid diet include: water, clear broth or bouillon, Gatorade, Pedialyte or Powerade, carbonated and non-carbonated soft drinks (Sprite , 7-Up , ginger ale), strained fruit juices without pulp (apple, white grape, white cranberry), Jell-O  and popsicles.     The following are not allowed on a clear liquid diet: red liquids, alcoholic beverages, dairy products (milk, creamer, and yogurt), protein shakes, creamy broths, juice with pulp and chewing tobacco.    At noon: take 2 (two) bisacodyl tablets     At 4 (and no later than 6pm): start drinking the  Miralax-Gatorade preparation (8.3 oz of Miralax mixed with 64 oz of Gatorade in a large pitcher). Drink 1(one) 8 oz glass every 15 minutes thereafter, until the mixture is gone.    COLON CLEANSING TIPS: drink adequate amounts of fluids before and after your colon cleansing to prevent dehydration. Stay near a toilet because you will have diarrhea. Even if you are sitting on the toilet, continue to drink the cleansing solution every 15 minutes. If you feel nauseous or vomit, rinse your mouth with water, take a 15 to 30-minute-break and then continue drinking the solution. You will be uncomfortable until the stool has flushed from your colon (in about 2 to 4 hours). You may feel chilled.    Day of your procedure  You may take all of your morning medications including blood pressure medications, blood thinners (if you have not been instructed to stop these by our office), methadone, anti-seizure medications with sips of water 3 hours prior to your procedure or earlier. Do not take insulin or vitamins prior to your procedure. Continue the clear liquid diet.       4 hours prior: drink 10 oz of magnesium citrate. It may be easier to drink it with a straw.    STOP consuming all liquids after that.     Do not take anything by mouth during this time.     Allow extra time to travel to your procedure as you may need to stop and use a restroom along the way.    You are ready for the procedure, if you followed all instructions and your stool is no longer formed, but clear or yellow liquid. If you are unsure whether your colon is clean, please call our office at [phone] before you leave for your appointment.    Bring the following to your procedure:  - Insurance Card/Photo ID.   - List of current medications including over-the-counter medications and supplements.   - Your rescue inhaler if you currently use one to control asthma.      Canceling or rescheduling your appointment:   If you must cancel or reschedule your  appointment, please call [phone] as soon as possible.      COLONOSCOPY PRE-PROCEDURE CHECKLIST    If you have diabetes, ask your regular doctor for diet and medication restrictions.  If you take an anticoagulant or anti-platelet medication (such as Coumadin , Lovenox , Pradaxa , Xarelto , Eliquis , etc.), please call your primary doctor for advice on holding this medication.  If you take aspirin you may continue to do so.  If you are or may be pregnant, please discuss the risks and benefits of this procedure with your doctor.        What happens during a colonoscopy?    Plan to spend up to two hours, starting at registration time, at the endoscopy center the day of your procedure. The colonoscopy takes an average of 15 to 30 minutes. Recovery time is about 30 minutes.      Before the exam:    You will change into a gown.    Your medical history and medication list will be reviewed with you, unless that has been done over the phone prior to the procedure.     A nurse will insert an intravenous (IV) line into your hand or arm.    The doctor will meet with you and will give you a consent form to sign.  During the exam:     Medicine will be given through the IV line to help you relax.     Your heart rate and oxygen levels will be monitored. If your blood pressure is low, you may be given fluids through the IV line.     The doctor will insert a flexible hollow tube, called a colonoscope, into your rectum. The scope will be advanced slowly through the large intestine (colon).    You may have a feeling of fullness or pressure.     If an abnormal tissue or a polyp is found, the doctor may remove it through the endoscope for closer examination, or biopsy. Tissue removal is painless    After the exam:           Any tissue samples removed during the exam will be sent to a lab for evaluation. It may take 5-7 working days for you to be notified of the results.     A nurse will provide you with complete discharge  instructions before you leave the endoscopy center. Be sure to ask the nurse for specific instructions if you take blood thinners such as Aspirin, Coumadin or Plavix.     The doctor will prepare a full report for you and for the physician who referred you for the procedure.     Your doctor will talk with you about the initial results of your exam.      Medication given during the exam will prohibit you from driving for the rest of the day.     Following the exam, you may resume your normal diet. Your first meal should be light, no greasy foods. Avoid alcohol until the next day.     You may resume your regular activities the day after the procedure.         LOW-FIBER DIET    Foods RECOMMENDED Foods to AVOID   Breads, Cereal, Rice and Pasta:   White bread, rolls, biscuits, croissant and lisa toast.   Waffles, Urdu toast and pancakes.   White rice, noodles, pasta, macaroni and peeled cooked potatoes.   Plain crackers and saltines.   Cooked cereals: farina, cream of rice.   Cold cereals: Puffed Rice , Rice Krispies , Corn Flakes  and Special K    Breads, Cereal, Rice and Pasta:   Breads or rolls with nuts, seeds or fruit.   Whole wheat, pumpernickel, rye breads and cornbread.   Potatoes with skin, brown or wild rice, and kasha (buckwheat).     Vegetables:   Tender cooked and canned vegetables without seeds: carrots, asparagus tips, green or wax beans, pumpkin, spinach, lima beans. Vegetables:   Raw or steamed vegetables.   Vegetables with seeds.   Sauerkraut.   Winter squash, peas, broccoli, Brussel sprouts, cabbage, onions, cauliflower, baked beans, peas and corn.   Fruits:   Strained fruit juice.   Canned fruit, except pineapple.   Ripe bananas and melon. Fruits:   Prunes and prune juice.   Raw fruits.   Dried fruits: figs, dates and raisins.   Milk/Dairy:   Milk: plain or flavored.   Yogurt, custard and ice cream.   Cheese and cottage cheese Milk/Dairy:     Meat and other proteins:   ground, well-cooked tender  beef, lamb, ham, veal, pork, fish, poultry and organ meats.   Eggs.   Peanut butter without nuts. Meat and other proteins:   Tough, fibrous meats with gristle.   Dry beans, peas and lentils.   Peanut butter with nuts.   Tofu.   Fats, Snack, Sweets, Condiments and Beverages:   Margarine, butter, oils, mayonnaise, sour cream and salad dressing, plain gravy.   Sugar, hard candy, clear jelly, honey and syrup.   Spices, cooked herbs, bouillon, broth and soups made with allowed vegetable, ketchup and mustard.   Coffee, tea and carbonated drinks.   Plain cakes, cookies and pretzels.   Gelatin, plain puddings, custard, ice cream, sherbet and popsicles. Fats, Snack, Sweets, Condiments and Beverages:   Nuts, seeds and coconut.   Jam, marmalade and preserves.   Pickles, olives, relish and horseradish.   All desserts containing nuts, seeds, dried fruit and coconut; or made from whole grains or bran.   Candy made with nuts or seeds.   Popcorn.                     DIRECTIONS TO THE ENDOSCOPY DEPARTMENT     From the north ([facility])  Take [address] South, exit on [address] [address]. Get into the left hand cherie, turn left (east), go one-half mile to [address] and turn left. Go north to the first stoplight, take a right on [address] and follow it to the Emergency entrance.    From the south ([facility])  Take 35N to the 35E split and exit on [address] [address]. On [address] [address], turn left (west) to [address]. Turn right (north) on [address]. Go north to the first stoplight, take a right on [address] and follow it to the Emergency entrance.    From the east via 35E ([facility])  Take [address]E south to [address] [address] exit. Turn right on [address] [address]. Go west to [address]. Turn right (north) on [address]. Go to the first stoplight, take a right and follow on [address] to the Emergency entrance.    From the east via [address] ([facility])  [address]  to [address]. Turn left (south) on [address] to [address]. Turn left (east) on [address] and follow it to the Emergency entrance.    From the west via [address] ([place])  Take [address] east to [address]. Turn right (south) on [address] to [address]. Turn left (east) on [address] and follow it to the Emergency entrance.

## 2019-12-04 NOTE — H&P
Pre-Endoscopy History and Physical     Mily Hilliard MRN# [id number]   YOB: 1950 Age: 69 year old     Date of Procedure: 2019  Primary care provider: Jessica Acevedo  Type of Endoscopy: Colonoscopy with possible biopsy, possible polypectomy  Reason for Procedure: screen  Type of Anesthesia Anticipated: Conscious Sedation    HPI:    Mily is a 69 year old female who will be undergoing the above procedure.      A history and physical has been performed. The patient's medications and allergies have been reviewed. The risks and benefits of the procedure and the sedation options and risks were discussed with the patient.  All questions were answered and informed consent was obtained.      She denies a personal or family history of anesthesia complications or bleeding disorders.     Patient Active Problem List   Diagnosis     Hypothyroidism     Hypertension     Hypercholesterolemia     Breast cancer (H)     Essential tremor        Past Medical History:   Diagnosis Date     Breast cancer (H) 2000    right breast     Hypercholesterolemia      Hypertension      Hypothyroidism         Past Surgical History:   Procedure Laterality Date     Bilateral Foot surgery       COLONOSCOPY  2014    Dr. Naylor [facility]     EYE SURGERY      cataract removal, macular hole repair     LUMPECTOMY BREAST  2000    Right       Social History     Tobacco Use     Smoking status: Former Smoker     Last attempt to quit: 2003     Years since quittin.9     Smokeless tobacco: Never Used   Substance Use Topics     Alcohol use: Yes     Alcohol/week: 11.7 standard drinks     Types: 14 Standard drinks or equivalent per week     Comment: occ       Family History   Problem Relation Age of Onset     Cancer - colorectal Mother         at age 94     Breast Cancer Mother      Diabetes Sister         pre-diabetic     Breast Cancer Sister      Macular Degeneration Father      Bladder Cancer Father      Stomach Cancer  "Maternal Grandfather        Prior to Admission medications    Medication Sig Start Date End Date Taking? Authorizing Provider   lisinopril-hydrochlorothiazide (PRINZIDE/ZESTORETIC) 10-12.5 MG tablet Take 1 tablet by mouth daily 10-12.5mg 5/31/19  Yes Jessica Acevedo MD   propranolol ER (INDERAL LA) 60 MG 24 hr capsule TAKE 1 CAPSULE BY MOUTH EVERY DAY 5/31/19  Yes Jessica Acevedo MD   vitamin C (ASCORBIC ACID) 500 MG tablet Take 500 mg by mouth daily   Yes Reported, Patient   calcium acetate (PHOSLO) 667 MG CAPS capsule Take 667 mg by mouth daily    Reported, Patient   fluticasone (FLONASE) 50 MCG/ACT nasal spray SPRAY 2 SPRAYS INTO BOTH NOSTRILS DAILY 8/9/19   Jessica Acevedo MD   levothyroxine (SYNTHROID) 112 MCG tablet TAKE 1 TABLET (112 MCG) BY MOUTH DAILY 5/31/19   Jessica Acevedo MD   Multiple Vitamins-Minerals (MULTIVITAMIN PO)     Reported, Patient   vitamin E 400 units TABS Take 400 Units by mouth daily    Reported, Patient       Allergies   Allergen Reactions     No Known Allergies         REVIEW OF SYSTEMS:   5 point ROS negative except as noted above in HPI, including Gen., Resp., CV, GI &  system review.    PHYSICAL EXAM:   There were no vitals taken for this visit. Estimated body mass index is 25.2 kg/m  as calculated from the following:    Height as of 5/31/19: 1.608 m (5' 3.3\").    Weight as of 5/31/19: 65.1 kg (143 lb 9.6 oz).   GENERAL APPEARANCE: alert, and oriented  MENTAL STATUS: alert  AIRWAY EXAM: Mallampatti Class I (visualization of the soft palate, fauces, uvula, anterior and posterior pillars)  RESP: lungs clear to auscultation - no rales, rhonchi or wheezes  CV: regular rates and rhythm  DIAGNOSTICS:    Not indicated    IMPRESSION   ASA Class 2 - Mild systemic disease    PLAN:   Plan for Colonoscopy with possible biopsy, possible polypectomy. We discussed the risks, benefits and alternatives and the patient wished to proceed.    The above has been " forwarded to the consulting provider.      Signed Electronically by: Catrachito Naylor MD  December 4, 2019

## 2020-01-09 ENCOUNTER — APPOINTMENT (OUTPATIENT)
Dept: GENERAL RADIOLOGY | Facility: CLINIC | Age: 70
End: 2020-01-09
Attending: EMERGENCY MEDICINE
Payer: COMMERCIAL

## 2020-01-09 ENCOUNTER — HOSPITAL ENCOUNTER (EMERGENCY)
Facility: CLINIC | Age: 70
Discharge: HOME | End: 2020-01-09
Attending: EMERGENCY MEDICINE | Admitting: EMERGENCY MEDICINE
Payer: COMMERCIAL

## 2020-01-09 VITALS
OXYGEN SATURATION: 98 % | TEMPERATURE: 97.7 F | RESPIRATION RATE: 18 BRPM | DIASTOLIC BLOOD PRESSURE: 66 MMHG | HEIGHT: 66 IN | WEIGHT: 147 LBS | BODY MASS INDEX: 23.63 KG/M2 | SYSTOLIC BLOOD PRESSURE: 152 MMHG | HEART RATE: 56 BPM

## 2020-01-09 DIAGNOSIS — R05.9 COUGH: ICD-10-CM

## 2020-01-09 PROCEDURE — 99285 EMERGENCY DEPT VISIT HI MDM: CPT | Mod: 25

## 2020-01-09 PROCEDURE — 71046 X-RAY EXAM CHEST 2 VIEWS: CPT

## 2020-01-09 PROCEDURE — 93005 ELECTROCARDIOGRAM TRACING: CPT

## 2020-01-09 RX ORDER — BENZONATATE 100 MG/1
100 CAPSULE ORAL 3 TIMES DAILY PRN
Qty: 21 CAPSULE | Refills: 0 | Status: SHIPPED | OUTPATIENT
Start: 2020-01-09 | End: 2020-01-16

## 2020-01-09 ASSESSMENT — ENCOUNTER SYMPTOMS
HEADACHES: 0
FEVER: 0
VOMITING: 0
MYALGIAS: 0
SHORTNESS OF BREATH: 1
COUGH: 1
CHILLS: 0
NAUSEA: 0

## 2020-01-09 ASSESSMENT — MIFFLIN-ST. JEOR: SCORE: 1203.54

## 2020-01-09 NOTE — ED AVS SNAPSHOT
[facility] Emergency Department  [address]  [facility] [address]  Phone:  [phone]  Fax:  [phone]                                    Mily Hilliard   MRN: [id number]    Department:  [facility] Emergency Department   Date of Visit:  1/9/2020           After Visit Summary Signature Page    I have received my discharge instructions, and my questions have been answered. I have discussed any challenges I see with this plan with the nurse or doctor.    ..........................................................................................................................................  Patient/Patient Representative Signature      ..........................................................................................................................................  Patient Representative Print Name and Relationship to Patient    ..................................................               ................................................  Date                                   Time    ..........................................................................................................................................  Reviewed by Signature/Title    ...................................................              ..............................................  Date                                               Time          22EPIC Rev 08/18

## 2020-01-10 LAB — INTERPRETATION ECG - MUSE: NORMAL

## 2020-01-10 NOTE — ED PROVIDER NOTES
History     Chief Complaint:    Cough    The history is provided by the patient.      Mily Hilliard is a 69 year old female with a history of breast cancer in remission and hypertension who presents with a cough. The patient reports that her cough began today. She has been experiencing shortness of breath when coughing and the cough is slightly productive without blood. She denies any fevers, chills, nausea, vomiting, headaches, myalgias, or chest pain. The patient denies any history of COPD. She denies any falls and has not been around anybody sick, but has been around her grandchildren lately.     Allergies:  No Known Drug Allergies     Medications:    Phoslo  Flonase  Synthroid  Lisinopril-hydrochlorothiazide  Inderal LA    Past Medical History:    Breast cancer  Hypertension  Essential tremor  Hypothyroidism  Hypercholesterolemia  Vitrectomy posterior, right  Unstable balance   Peripheral neuropathy, secondary to chemotherapy   Panic disorder without agoraphobia    Past Surgical History:    Bilateral foot surgery  Colonoscopy x 2  Cataract removal, macular hole repair  Right breast lumpectomy     Family History:    Colorectal cancer - mother  Breast cancer - mother  Prediabetes - sister  Breast cancer - sister  Bladder cancer - father  Macular degeneration - father  Osteoporosis - mother  Type 2 diabetes - sister    Social History:  Negative for tobacco use - former smoker, quit 2003.  Positive for alcohol use - 14 drinks/week.  Negative for drug use.  Patient accompanied to the ED by her .   Marital Status:   [2]     Review of Systems   Constitutional: Negative for chills and fever.   Respiratory: Positive for cough and shortness of breath.    Cardiovascular: Negative for chest pain.   Gastrointestinal: Negative for nausea and vomiting.   Musculoskeletal: Negative for myalgias.   Neurological: Negative for headaches.   All other systems reviewed and are negative.      Physical Exam  "    Patient Vitals for the past 24 hrs:   BP Temp Temp src Pulse Heart Rate Resp SpO2 Height Weight   01/09/20 2245 (!) 152/66 -- -- -- -- -- -- -- --   01/09/20 2238 -- 97.7  F (36.5  C) Oral 56 56 18 98 % 1.676 m (5' 6\") 66.7 kg (147 lb)     Physical Exam  General: Patient is awake, alert and interactive when I enter the room  Head: The scalp, face, and head appear normal  Eyes: The pupils are equal, round, and reactive to light. Conjunctivae and sclerae are normal  Neck: Normal range of motion. No anterior cervical lymphadenopathy noted  CV: Regular rate. S1/S2. No murmurs.   Resp: No respiratory distress. Coarse breath sounds on the left without any wheezing present.   GI: Abdomen is soft, no rigidity, guarding, or rebound. No distension. No tenderness to palpation in any quadrant.     MS: Normal tone. Joints grossly normal without effusions. No asymmetric leg swelling, calf or thigh tenderness.    Skin: No rash or lesions noted. Normal capillary refill noted  Neuro: Speech is normal and fluent. Face is symmetric. Moving all extremities.   Psych:  Normal affect.  Appropriate interactions.    Emergency Department Course     ECG (23:04:50):  Rate 51 bpm. ND interval 162. QRS duration 84. QT/QTc 484/446. P-R-T axes 58 28 59. Sinus bradycardia. Otherwise normal ECG. No significant change compared to EKG dated 5/9/13. Interpreted at 2304 by Chava Lauren MD.    Imaging:  Radiology findings were communicated with the patient and family who voiced understanding of the findings.    XR  Chest 2 Views:   There is an irregular vertically oriented somewhat linear density measuring approximately 3 cm in length projected over the right mid lung laterally on the frontal view. There are some adjacent surgical clips. This appears to project along the anterior chest wall on the lateral view and is presumably postoperative in nature. This process was also present on 01/06/2009 although the linear density has increased " over time. This again is likely due to benign postoperative change. Clinical correlation. No acute appearing infiltrates or consolidation. Normal heart size and pulmonary vascularity. Aortic calcification. No significant bony abnormality, as per radiology.     Emergency Department Course:  Past medical records, nursing notes, and vitals reviewed.    2311: I performed an exam of the patient as documented above.     EKG obtained in the ED, see results above.     The patient was sent for a chest x-ray while in the emergency department, results above.     2341: I rechecked the patient and discussed the results of her workup thus far.     I personally reviewed the imaging results with the Patient and spouse and answered all related questions prior to discharge.     Findings and plan explained to the Patient and spouse. Patient discharged home with instructions regarding supportive care, medications, and reasons to return. The importance of close follow-up was reviewed.     Impression & Plan     Medical Decision Making:  Mily Hilliard is a 69 year old female who presents for evaluation of cough.  This is consistent with an upper respiratory tract infection.  There is no signs at this point of serious bacterial infection such as OM, RPA, epiglottitis, PTA, strep pharyngitis, pneumonia, sinusitis, meningitis, bacteremia, serious bacterial infection.  Given course breath sounds on exam , I did a CXR to eval for pneumonia, fortunately this was negative for any evidence of pneumonia, pleural effusion, pulmonary edema, widened mediastinum or pneumothorax.  KG was obtained which did not show any acute signs of ischemia or dysrhythmia. There are no gastrointestinal symptoms at this point and no signs of dehydration.  Close followup with primary care physician is indicated.  Return to ED for fever > 103, protracted vomiting, confusion.      Diagnosis:    ICD-10-CM   1. Cough R05     Disposition:  Discharged to  home.    Discharge Medications:  New Prescriptions    BENZONATATE (TESSALON) 100 MG CAPSULE    Take 1 capsule (100 mg) by mouth 3 times daily as needed for cough     Scribe Disclosure:  I, Tammy Santos, am serving as a scribe at 10:58 PM on 1/9/2020 to document services personally performed by Chava Lauren MD based on my observations and the provider's statements to me.     Tammy Santos  1/9/2020   [facility] EMERGENCY DEPARTMENT       Chava Lauren MD  01/10/20 0351

## 2020-01-10 NOTE — ED TRIAGE NOTES
Pt arrives to the ED due to a cough that began today. Pt states she did not have a cough yesterday. Pt states SOB when coughing only. Pt states cough is dry. Denies any fevers. No distress noted during triage.

## 2020-01-10 NOTE — ED NOTES
Pt provided with discharge paperwork and educated on recommended follow-up with PCP. Pt educated on how to manage symptoms at home and how to take prescribed medications appropriately. Pt voiced understanding and denied any questions at discharge.

## 2020-01-10 NOTE — DISCHARGE INSTRUCTIONS

## 2020-04-19 ENCOUNTER — VIRTUAL VISIT (OUTPATIENT)
Dept: FAMILY MEDICINE | Facility: OTHER | Age: 70
End: 2020-04-19

## 2020-04-19 NOTE — PROGRESS NOTES
"Date: 2020 08:32:46  Clinician: Suri Landis  Clinician NPI: [id number]  Patient: Mily Hilliard  Patient : 1950  Patient Address: [address] [place] [address]  Patient Phone: [phone]  Visit Protocol: URI  Patient Summary:  Mily is a 69 year old ( : 1950 ) female who initiated a Visit for cold, sinus infection, or influenza. When asked the question \"Please sign me up to receive news, health information and promotions. \", Mily responded \"Yes\".    Mily states her symptoms started 1-2 days ago.   Her symptoms consist of chills, a sore throat, a cough, malaise, and myalgia.   Symptom details     Cough: Mily coughs every 5-10 minutes and her cough is more bothersome at night. Phlegm comes into her throat when she coughs. She believes her cough is caused by post-nasal drip. The color of the phlegm is clear and white.     Sore throat: Mily reports having mild throat pain (1-3 on a 10 point pain scale), does not have exudate on her tonsils, and can swallow liquids. The lymph nodes in her neck are not enlarged. A rash has not appeared on the skin since the sore throat started.      Mily denies having rhinitis, enlarged lymph nodes, diarrhea, facial pain or pressure, nasal congestion, ear pain, fever, vomiting, nausea, teeth pain, headache, wheezing, anosmia, and ageusia. She also denies taking antibiotic medication for the symptoms and having recent facial or sinus surgery in the past 60 days. She is not experiencing dyspnea.   Precipitating events  Within the past week, Mily has not been exposed to someone with strep throat. She has not recently been exposed to someone with influenza. Mily has been in close contact with the following high risk individuals: adults 65 or older.   Pertinent COVID-19 (Coronavirus) information  Mily has not traveled internationally or to the areas where COVID-19 (Coronavirus) is widespread, including cruise ship travel in the last " 14 days before the start of her symptoms.   Mily does not work or volunteer as healthcare worker or a  and does not work or volunteer in a healthcare facility.   She does not live with a healthcare worker.   Mily has not had a close contact with a laboratory-confirmed COVID-19 patient within 14 days of symptom onset. She also has not had a close contact with a suspected COVID-19 patient within 14 days of symptom onset.   Pertinent medical history  Mily does not get yeast infections when she takes antibiotics.   Mily does not need a return to work/school note.   Weight: 145 lbs   Mily does not smoke or use smokeless tobacco.   Weight: 145 lbs    MEDICATIONS: levothyroxine oral, propranolol oral, lisinopril-hydrochlorothiazide oral, ALLERGIES: NKDA  Clinician Response:  Dear Mily,   Dear Mily  Your symptoms show that you may have coronavirus (COVID-19). This illness can cause fever, cough and trouble breathing. Many people get a mild case and get better on their own. Some people can get very sick.  Will I be tested for COVID-19?  Because the virus is spreading, we are no longer testing most patients. You may request testing if:   You are very ill. For example, you're on chemotherapy, dialysis or home hospice care. (Contact your specialty clinic or program.)   You live in a nursing home or other long-term care facility. (Talk to your nurse manager or medical director.)   You're a health care worker. (Contact your employee health office.)   How can I protect others?  Without a test, we can't know for sure that you have COVID-19. For safety, it's very important to follow these rules.  First, stay home and away from others (self-isolate) until:   You've had no fever---and no medicine that reduces fever---for 3 full days (72 hours). And...    Your other symptoms have gotten better. For example, your cough or breathing has improved. And...   At least 7 days have passed since your symptoms  started.   During this time:   Don't go to work, school or anywhere else.    Stay away from others in your home. No hugging, kissing or shaking hands.   Don't let anyone visit.   Cover your mouth and nose with a mask, tissue or wash cloth to avoid spreading germs.   Wash your hands and face often. Use soap and water.   How can I take care of myself?   1.Take Tylenol (acetaminophen) for fever or pain. If you have liver or kidney problems, ask your family doctor if it's okay to take Tylenol.        Adults can take either:    650 mg (two 325 mg pills) every 4 to 6 hours, or...   1,000 mg (two 500 mg pills) every 8 hours as needed.    Note: Don't take more than 3,000 mg in one day.   For children, check the Tylenol bottle for the right dose. The dose is based on the child's age or weight.   2.If you have other health problems (like cancer, heart failure, an organ transplant or severe kidney disease): Call your specialty clinic if you don't feel better in the next 2 days.       3.Know when to call 911: If your breathing is so bad that it keeps you from doing normal activities, call 911 or go to the emergency room. Tell them that you've been staying home and may have COVID-19.       4.Sign up for [employer]. We know it's scary to hear that you might have COVID-19. We want to track your symptoms to make sure you're okay over the next 2 weeks. Please look for an email from [employer]---this is a free, online program that we'll use to keep in touch. To sign up, follow the link in the email. Learn more at [email or web address].   Where can I get more information?  To learn more about COVID-19 and how to care for yourself at home, please visit the CDC website at https://www.cdc.gov/coronavirus/2019-ncov/about/steps-when-sick.html.  For more options for care at [facility], please visit our website at [email or web address]     Diagnosis: Cough  Diagnosis ICD: R05

## 2020-05-19 DIAGNOSIS — R09.82 POST-NASAL DRIP: ICD-10-CM

## 2020-05-19 RX ORDER — FLUTICASONE PROPIONATE 50 MCG
2 SPRAY, SUSPENSION (ML) NASAL DAILY
Qty: 16 ML | Refills: 0 | Status: SHIPPED | OUTPATIENT
Start: 2020-05-19 | End: 2020-06-18

## 2020-05-19 NOTE — TELEPHONE ENCOUNTER
Medication is being filled for 1 time refill only due to:  Pt due to be seen 5/31     MA/TC - please contact pt to help schedule an annual visit with PCP    Thanks!  Sherley Briscoe RN on 5/19/2020 at 3:16 PM

## 2020-05-20 NOTE — TELEPHONE ENCOUNTER
Routing to MA/TC pool to call and schedule Pt for a visit with a new PCP (former [facility] Pt. )    Lyudmila Laguerre RN   [facility] [place] [facility] -- Triage Nurse

## 2020-05-21 NOTE — TELEPHONE ENCOUNTER
Called patient- she will call the clinic at a later time to schedule an appointment. Will postpone to a later date to check up.     Suzy Carmen MA

## 2020-06-01 ENCOUNTER — ANCILLARY PROCEDURE (OUTPATIENT)
Dept: MAMMOGRAPHY | Facility: CLINIC | Age: 70
End: 2020-06-01
Payer: COMMERCIAL

## 2020-06-01 DIAGNOSIS — E06.3 HYPOTHYROIDISM DUE TO HASHIMOTO'S THYROIDITIS: ICD-10-CM

## 2020-06-01 DIAGNOSIS — Z12.31 VISIT FOR SCREENING MAMMOGRAM: ICD-10-CM

## 2020-06-01 PROCEDURE — 77067 SCR MAMMO BI INCL CAD: CPT | Mod: TC

## 2020-06-01 RX ORDER — LEVOTHYROXINE SODIUM 112 UG/1
TABLET ORAL
Qty: 30 TABLET | Refills: 0 | Status: SHIPPED | OUTPATIENT
Start: 2020-06-01 | End: 2020-06-18

## 2020-06-01 NOTE — TELEPHONE ENCOUNTER
Prescription approved per [facility] Refill Protocol.  For 30 day fill    Iman Díaz RN

## 2020-06-11 DIAGNOSIS — R09.82 POST-NASAL DRIP: ICD-10-CM

## 2020-06-11 NOTE — TELEPHONE ENCOUNTER
Patient is due for yearly appointment and establish care with new provider.  Please call and assist with scheduling appointment and ask if medication is needed prior to appointment and route back to refill pool.    Jessica JAIME - Registered Nurse  [facility]  Acute and Diagnostic Services

## 2020-06-11 NOTE — TELEPHONE ENCOUNTER
Called patient and LVM to return phone call to clinic in regards to scheduling establish care visit.     Jessica Polanco, CMA

## 2020-06-15 NOTE — PROGRESS NOTES
"  Mily Hilliard is a 69 year old female who is being evaluated via a billable telephone visit.      The patient has been notified of following:     \"This telephone visit will be conducted via a call between you and your physician/provider. We have found that certain health care needs can be provided without the need for a physical exam.  This service lets us provide the care you need with a short phone conversation.  If a prescription is necessary we can send it directly to your pharmacy.  If lab work is needed we can place an order for that and you can then stop by our lab to have the test done at a later time.    Telephone visits are billed at different rates depending on your insurance coverage. During this emergency period, for some insurers they may be billed the same as an in-person visit.  Please reach out to your insurance provider with any questions.    If during the course of the call the physician/provider feels a telephone visit is not appropriate, you will not be charged for this service.\"    Patient has given verbal consent for Telephone visit?  Yes    What phone number would you like to be contacted at? [phone]    How would you like to obtain your AVS? Omar Sanchez     Mily Hilliard is a 69 year old female who presents via phone visit today for the following health issues:    History of Present Illness    She consumes 0 sweetened beverage(s) daily.She exercises with enough effort to increase her heart rate 10 to 19 minutes per day.  She exercises with enough effort to increase her heart rate 4 days per week.   She is taking medications regularly.    New Patient/Transfer of Care  Hypertension Follow-up      Do you check your blood pressure regularly outside of the clinic? Yes     Are you following a low salt diet? Yes    Are your blood pressures ever more than 140 on the top number (systolic) OR more   than 90 on the bottom number (diastolic), for example 140/90? " No    Hypothyroidism Follow-up      Since last visit, patient describes the following symptoms: Weight stable, no hair loss, no skin changes, no constipation, no loose stools      Mily calls in to establish care and address the following issues. She was supposed to come in during May for her annual exam, but this was rescheduled due to COVID.     1. Hypothyroidism: On levothyroxine, no side effects. Has noticed some hair loss more recently, but no weight changes or fatigue.   2. Hypertension: On propranolol and lisinopril-hydrochlorothiazide. No side effects with medications. No chest pain, SOB, headaches. Does monitor blood pressure cuff. BP is usually around 140/70.   3. Hx of breast cancer: In R breast, s/p conservative therapy. Has annual mammograms, most recently done this month.   4. Did have a COVID test at [facility] earlier this week. Had some black spots in the mucous that she coughed up, does have some allergies and this cough isn't abnormal for her.   5. Essential tremor: managed with propranolol.      Patient Active Problem List   Diagnosis     Hypothyroidism     Hypertension     Hypercholesterolemia     Breast cancer (H)     Essential tremor     Past Surgical History:   Procedure Laterality Date     Bilateral Foot surgery       COLONOSCOPY  2014    Dr. Naylor [facility]     COLONOSCOPY N/A 2019    Procedure: COLONOSCOPY;  Surgeon: Catrachito Naylor MD;  Location:  GI     EYE SURGERY      cataract removal, macular hole repair     LUMPECTOMY BREAST  2000    Right       Social History     Tobacco Use     Smoking status: Former Smoker     Packs/day: 0.00     Years: 0.00     Pack years: 0.00     Last attempt to quit: 2003     Years since quittin.4     Smokeless tobacco: Never Used   Substance Use Topics     Alcohol use: Yes     Alcohol/week: 11.7 standard drinks     Comment: occ     Family History   Problem Relation Age of Onset     Cancer - colorectal Mother         at age 94     Breast  Cancer Mother      Diabetes Sister         pre-diabetic     Breast Cancer Sister      Macular Degeneration Father      Bladder Cancer Father      Stomach Cancer Maternal Grandfather      Diabetes Sister      Breast Cancer Sister            Reviewed and updated as needed this visit by Provider         Review of Systems   Constitutional, HEENT, cardiovascular, pulmonary, gi and gu systems are negative, except as otherwise noted.       Objective   Reported vitals:  There were no vitals taken for this visit.     PSYCH: Alert and oriented times 3; coherent speech, normal   rate and volume, able to articulate logical thoughts, able   to abstract reason, no tangential thoughts, no hallucinations   or delusions  Her affect is normal  RESP: No cough, no audible wheezing, able to talk in full sentences  Remainder of exam unable to be completed due to telephone visits    Diagnostic Test Results:  Labs reviewed in Epic        Assessment/Plan:  1. Essential hypertension  Per patient, symptoms relatively well controlled. Checking her BP at home. Discussed that if she has SBP >140, would increase lisinopril component. Has had mild hyponatremia in past, will need to monitor (but this is stable).   - propranolol ER (INDERAL LA) 60 MG 24 hr capsule; TAKE 1 CAPSULE BY MOUTH EVERY DAY  Dispense: 90 capsule; Refill: 1  - lisinopril-hydrochlorothiazide (ZESTORETIC) 10-12.5 MG tablet; Take 1 tablet by mouth daily 10-12.5mg  Dispense: 90 tablet; Refill: 1    2. Essential tremor  Well controlled with propranolol.   - propranolol ER (INDERAL LA) 60 MG 24 hr capsule; TAKE 1 CAPSULE BY MOUTH EVERY DAY  Dispense: 90 capsule; Refill: 1    3. Hypothyroidism due to Hashimoto's thyroiditis  Well controlled, due for refill.   - levothyroxine (SYNTHROID/LEVOTHROID) 112 MCG tablet; Take 1 tablet (112 mcg) by mouth daily  Dispense: 90 tablet; Refill: 1    4. Post-nasal drip  - fluticasone (FLONASE) 50 MCG/ACT nasal spray; Spray 2 sprays into both  nostrils daily  Dispense: 16 mL; Refill: 4    No follow-ups on file.      Phone call duration:  12 minutes    Meri Pacheco MD  Internal Medicine-Pediatrics

## 2020-06-17 RX ORDER — FLUTICASONE PROPIONATE 50 MCG
SPRAY, SUSPENSION (ML) NASAL
Qty: 16 ML | Refills: 0 | OUTPATIENT
Start: 2020-06-17

## 2020-06-17 NOTE — TELEPHONE ENCOUNTER
Patient has appointment with Dr. Pacheco tomorrow and is good on medication until that time.     Routing back to refill pool to cancel medication request.     Thank you,     Jessica Polanco CMA

## 2020-06-18 ENCOUNTER — VIRTUAL VISIT (OUTPATIENT)
Dept: PEDIATRICS | Facility: CLINIC | Age: 70
End: 2020-06-18
Payer: COMMERCIAL

## 2020-06-18 DIAGNOSIS — R09.82 POST-NASAL DRIP: ICD-10-CM

## 2020-06-18 DIAGNOSIS — G25.0 ESSENTIAL TREMOR: ICD-10-CM

## 2020-06-18 DIAGNOSIS — I10 ESSENTIAL HYPERTENSION: ICD-10-CM

## 2020-06-18 DIAGNOSIS — E06.3 HYPOTHYROIDISM DUE TO HASHIMOTO'S THYROIDITIS: ICD-10-CM

## 2020-06-18 PROCEDURE — 99214 OFFICE O/P EST MOD 30 MIN: CPT | Mod: 95 | Performed by: INTERNAL MEDICINE

## 2020-06-18 RX ORDER — FLUTICASONE PROPIONATE 50 MCG
2 SPRAY, SUSPENSION (ML) NASAL DAILY
Qty: 16 ML | Refills: 4 | Status: SHIPPED | OUTPATIENT
Start: 2020-06-18 | End: 2020-09-15

## 2020-06-18 RX ORDER — LEVOTHYROXINE SODIUM 112 UG/1
112 TABLET ORAL DAILY
Qty: 90 TABLET | Refills: 1 | Status: SHIPPED | OUTPATIENT
Start: 2020-06-18 | End: 2021-01-26

## 2020-06-18 RX ORDER — LISINOPRIL/HYDROCHLOROTHIAZIDE 10-12.5 MG
1 TABLET ORAL DAILY
Qty: 90 TABLET | Refills: 1 | Status: SHIPPED | OUTPATIENT
Start: 2020-06-18 | End: 2020-12-24

## 2020-06-18 RX ORDER — PROPRANOLOL HCL 60 MG
CAPSULE, EXTENDED RELEASE 24HR ORAL
Qty: 90 CAPSULE | Refills: 1 | Status: SHIPPED | OUTPATIENT
Start: 2020-06-18 | End: 2021-02-08

## 2020-06-18 NOTE — PROGRESS NOTES
Left VM for patient to call back to schedule appointment in October. When patient calls back please schedule annual wellness visit for sometime in October.

## 2020-06-19 ENCOUNTER — E-VISIT (OUTPATIENT)
Dept: PEDIATRICS | Facility: CLINIC | Age: 70
End: 2020-06-19
Payer: COMMERCIAL

## 2020-06-19 DIAGNOSIS — R05.9 COUGH: Primary | ICD-10-CM

## 2020-06-19 PROCEDURE — 99207 ZZC NO BILLABLE SERVICE THIS VISIT: CPT | Performed by: INTERNAL MEDICINE

## 2020-07-07 DIAGNOSIS — Z11.59 SCREENING FOR VIRAL DISEASE: ICD-10-CM

## 2020-07-17 DIAGNOSIS — Z11.59 SCREENING FOR VIRAL DISEASE: ICD-10-CM

## 2020-07-17 PROCEDURE — 86769 SARS-COV-2 COVID-19 ANTIBODY: CPT | Performed by: FAMILY MEDICINE

## 2020-07-17 PROCEDURE — 36415 COLL VENOUS BLD VENIPUNCTURE: CPT | Performed by: FAMILY MEDICINE

## 2020-07-17 NOTE — LETTER
July 21, 2020        Mily Hilliard  [address] [place] [address]      No results found for: HKF38TH, LHI26VXF    COVID-19 Antibody, IgG   Date Value Ref Range Status   07/17/2020 Negative NEG^Negative Final     Comment:     Negative results do not rule out SARS-CoV-2 infection, particularly in those   who have been in contact with the virus.  Follow-up testing with a molecular   diagnostic should be considered to rule out infection in these individuals.  Results from antibody testing should not be used as the sole basis to diagnose   or exclude SARS-CoV-2 infection or to inform infection status.           You have tested NEGATIVE for COVID-19 antibodies. This suggests you have not had or been exposed to COVID-19. But it does not mean that for sure.     The test finds antibodies in most people 10 days after they get sick. For some people, it takes longer than 10 days for antibodies to show up. Others may never show antibodies against COVID-19, especially if they have weak immune systems.    If you have COVID-19 symptoms now, please stay home and away from others.     What is antibody testing?    This is a kind of blood test. We take a small sample of your blood, and then test it for something called  antibodies.      Your body makes antibodies to fight infection. If your blood has antibodies for a certain germ, it means you ve been infected with that germ in the past.     Sometimes, antibodies stay in your body for years after you ve had the infection. They can be there even if the germ didn t make you sick. They are a sign that your body fought off the infection.    Will this test find antibodies in everyone who s had COVID-19?    No. The test finds antibodies in most people 10 days after they get sick. For some people, it takes longer than 10 days for antibodies to show up. Others may never show antibodies against COVID-19, especially if they have weak immune systems.    What does it mean if the test  finds COVID-19 antibodies?    If we find these antibodies, it suggests:     This person has had the virus.     Their body s immune system fought the virus.     We don t know if this will help protect someone from getting COVID-19 again. Scientists are still learning about this.    What are the signs of COVID-19?    Signs of COVID-19 can appear from 2 to 14 days (up to 2 weeks) after you re infected. Some people have no symptoms or only mild symptoms. Others get very sick. The most common symptoms are:          Cough    Shortness of breath or trouble breathing  Or at least 2 of these symptoms:    Fever    Chills    Repeated shaking with chills    Muscle pain    Headache    Sore throat    Losing your sense of taste or smell    You may have other symptoms. Please contact your doctor or clinic for any symptoms that worry you.    Where can I get more information?     To learn the [facility] guidelines for staying home, please visit the [place] Department of Health website at [email or web address]    To learn more about COVID-19 and how to care for yourself at home, please visit the CDC website at https://www.cdc.gov/coronavirus/2019-ncov/about/steps-when-sick.html    For more options for care at [facility], please visit our website at [email or web address]    [facility] ([facility]) COVID-19 Hotline:  [phone]

## 2020-07-20 LAB
COVID-19 ANTIBODY IGG: NEGATIVE
LAB TEST METHOD: NORMAL

## 2020-09-14 DIAGNOSIS — R09.82 POST-NASAL DRIP: ICD-10-CM

## 2020-09-15 ENCOUNTER — TELEPHONE (OUTPATIENT)
Dept: PEDIATRICS | Facility: CLINIC | Age: 70
End: 2020-09-15

## 2020-09-15 RX ORDER — FLUTICASONE PROPIONATE 50 MCG
2 SPRAY, SUSPENSION (ML) NASAL DAILY
Qty: 16 ML | Refills: 4 | Status: SHIPPED | OUTPATIENT
Start: 2020-09-15 | End: 2022-07-14

## 2020-09-15 NOTE — TELEPHONE ENCOUNTER
Prescription approved per [facility] Refill Protocol.  Maci Wiggins RN

## 2020-09-15 NOTE — TELEPHONE ENCOUNTER
Due to provider unavailability, patient's appointment time 10/22/20 has changed to 2:30 pm.  Left message for patient informing her of this. Requested a call back to confirm new time.  Provided clinic number.    Nita Castellano

## 2020-10-16 ENCOUNTER — TELEPHONE (OUTPATIENT)
Dept: PEDIATRICS | Facility: CLINIC | Age: 70
End: 2020-10-16

## 2020-10-16 NOTE — TELEPHONE ENCOUNTER
Patient returned call.  She us unable to make new time, due to transportation issues.  Patient is rescheduled for 10/27/20 at 10:55 am.      Nita Castellano

## 2020-10-16 NOTE — TELEPHONE ENCOUNTER
Due to provider unavailability, patient's appointment time 10/22/20 has changed to 10:40 am.  Left message for patient informing her of new appointment time.  Requested a call back to confirm new time.  Provided clinic number.    Nita Castellano

## 2020-10-26 ASSESSMENT — ENCOUNTER SYMPTOMS
FREQUENCY: 0
ARTHRALGIAS: 1
COUGH: 1
ABDOMINAL PAIN: 1
JOINT SWELLING: 1
PARESTHESIAS: 0
WEAKNESS: 1
HEMATURIA: 0
NERVOUS/ANXIOUS: 1
PALPITATIONS: 0
CHILLS: 0
MYALGIAS: 1
DIARRHEA: 0
SORE THROAT: 0
BREAST MASS: 0
DYSURIA: 0
HEMATOCHEZIA: 0
CONSTIPATION: 0
EYE PAIN: 0
FEVER: 0
HEARTBURN: 1
NAUSEA: 0
DIZZINESS: 0
SHORTNESS OF BREATH: 0
HEADACHES: 0

## 2020-10-26 ASSESSMENT — ACTIVITIES OF DAILY LIVING (ADL): CURRENT_FUNCTION: NO ASSISTANCE NEEDED

## 2020-10-27 ENCOUNTER — OFFICE VISIT (OUTPATIENT)
Dept: PEDIATRICS | Facility: CLINIC | Age: 70
End: 2020-10-27
Payer: COMMERCIAL

## 2020-10-27 VITALS
RESPIRATION RATE: 18 BRPM | BODY MASS INDEX: 23.4 KG/M2 | OXYGEN SATURATION: 99 % | HEART RATE: 60 BPM | WEIGHT: 145 LBS | SYSTOLIC BLOOD PRESSURE: 142 MMHG | TEMPERATURE: 97.7 F | DIASTOLIC BLOOD PRESSURE: 90 MMHG

## 2020-10-27 DIAGNOSIS — E06.3 HYPOTHYROIDISM DUE TO HASHIMOTO'S THYROIDITIS: ICD-10-CM

## 2020-10-27 DIAGNOSIS — I10 ESSENTIAL HYPERTENSION: ICD-10-CM

## 2020-10-27 DIAGNOSIS — G25.0 ESSENTIAL TREMOR: ICD-10-CM

## 2020-10-27 DIAGNOSIS — K21.00 GASTROESOPHAGEAL REFLUX DISEASE WITH ESOPHAGITIS WITHOUT HEMORRHAGE: ICD-10-CM

## 2020-10-27 DIAGNOSIS — Z00.00 ENCOUNTER FOR MEDICARE ANNUAL WELLNESS EXAM: Primary | ICD-10-CM

## 2020-10-27 DIAGNOSIS — E78.00 HYPERCHOLESTEROLEMIA: ICD-10-CM

## 2020-10-27 PROCEDURE — 99397 PER PM REEVAL EST PAT 65+ YR: CPT | Performed by: INTERNAL MEDICINE

## 2020-10-27 PROCEDURE — 99213 OFFICE O/P EST LOW 20 MIN: CPT | Mod: 25 | Performed by: INTERNAL MEDICINE

## 2020-10-27 RX ORDER — FAMOTIDINE 40 MG/1
40 TABLET, FILM COATED ORAL 2 TIMES DAILY PRN
Qty: 90 TABLET | Refills: 3 | Status: SHIPPED | OUTPATIENT
Start: 2020-10-27 | End: 2022-02-09

## 2020-10-27 ASSESSMENT — ENCOUNTER SYMPTOMS
PARESTHESIAS: 0
FEVER: 0
BREAST MASS: 0
PALPITATIONS: 0
SORE THROAT: 0
HEADACHES: 0
CHILLS: 0
CONSTIPATION: 0
HEMATOCHEZIA: 0
HEARTBURN: 1
NAUSEA: 0
SHORTNESS OF BREATH: 0
EYE PAIN: 0
FREQUENCY: 0
WEAKNESS: 1
NERVOUS/ANXIOUS: 1
DYSURIA: 0
DIZZINESS: 0
ABDOMINAL PAIN: 1
COUGH: 1
JOINT SWELLING: 1
DIARRHEA: 0
HEMATURIA: 0
MYALGIAS: 1
ARTHRALGIAS: 1

## 2020-10-27 ASSESSMENT — ACTIVITIES OF DAILY LIVING (ADL): CURRENT_FUNCTION: NO ASSISTANCE NEEDED

## 2020-10-27 NOTE — PATIENT INSTRUCTIONS
Patient Education   Personalized Prevention Plan  You are due for the preventive services outlined below.  Your care team is available to assist you in scheduling these services.  If you have already completed any of these items, please share that information with your care team to update in your medical record.  Health Maintenance Due   Topic Date Due     ANNUAL REVIEW OF HM ORDERS  1950     Discuss Advance Care Planning  1950     Annual Wellness Visit  05/31/2020     FALL RISK ASSESSMENT  05/31/2020           I do recommend you get 1200 mg calcium daily.   Also [id number] international unit(s) vitamin D3 daily.     For the GERD:   You can know trigger foods and avoid them as much as possible.   Famotidine can be taken 1-2 time daily as needed.   Omeprazole can be taken 1-2 times daily as needed.    Tums can be used any time for extra symptoms.

## 2020-10-27 NOTE — PROGRESS NOTES
"SUBJECTIVE:   Mily Hilliard is a 70 year old female who presents for Preventive Visit.      Patient has been advised of split billing requirements and indicates understanding: Yes   Are you in the first 12 months of your Medicare coverage?  No    Healthy Habits:     In general, how would you rate your overall health?  Good    Frequency of exercise:  2-3 days/week    Duration of exercise:  30-45 minutes    Do you usually eat at least 4 servings of fruit and vegetables a day, include whole grains    & fiber and avoid regularly eating high fat or \"junk\" foods?  No    Taking medications regularly:  Yes    Medication side effects:  Not applicable    Ability to successfully perform activities of daily living:  No assistance needed    Home Safety:  No safety concerns identified    Hearing Impairment:  No hearing concerns    In the past 6 months, have you been bothered by leaking of urine?  No    In general, how would you rate your overall mental or emotional health?  Good      PHQ-2 Total Score: 1    Additional concerns today:  No    Has taken omeprazole for a long time, quit a month ago. Started taking aloe instead, did quite well without symptoms for a while, and then after eating some jalapeno cheese dip she has had a few days of bad symptoms so she took omeprazole a few times and does feel better.  She is wondering whether this is the best medicine for her.     Breast cancer about 20 years ago. Followed up regularly for 10 years. Lumpectomy, so continues annual mammogram.     Otherwise feeling well.  No concerns. Tries to continue doing yoga with her  as frequently as she can.     Do you feel safe in your environment? Yes    Have you ever done Advance Care Planning? (For example, a Health Directive, POLST, or a discussion with a medical provider or your loved ones about your wishes): Yes, advance care planning is on file.      Fall risk  Fallen 2 or more times in the past year?: No  Any fall with injury in " the past year?: No    Cognitive Screening   1) Repeat 3 items (Leader, Season, Table)    2) Clock draw: NORMAL  3) 3 item recall: Recalls 2 objects   Results: NORMAL clock, 1-2 items recalled: COGNITIVE IMPAIRMENT LESS LIKELY    Mini-CogTM Copyright RAFIA Mckinnon. Licensed by the author for use in [facility]; reprinted with permission (samuel@[facility]). All rights reserved.      Do you have sleep apnea, excessive snoring or daytime drowsiness?: no    Reviewed and updated as needed this visit by clinical staff  Tobacco  Allergies  Meds  Problems  Med Hx  Surg Hx  Fam Hx          Reviewed and updated as needed this visit by Provider  Tobacco  Allergies  Meds  Problems  Med Hx  Surg Hx  Fam Hx         Social History     Tobacco Use     Smoking status: Former Smoker     Packs/day: 0.50     Years: 30.00     Pack years: 15.00     Types: Cigarettes     Quit date: 2003     Years since quittin.8     Smokeless tobacco: Never Used   Substance Use Topics     Alcohol use: Yes     Alcohol/week: 11.7 standard drinks     Comment: occ     If you drink alcohol do you typically have >3 drinks per day or >7 drinks per week? No    Alcohol Use 10/27/2020   Prescreen: >3 drinks/day or >7 drinks/week? -   Prescreen: >3 drinks/day or >7 drinks/week? No             Current providers sharing in care for this patient include:   Patient Care Team:  Meri Pacheco MD as PCP - General (Internal Medicine)  Meri Pacheco MD as Assigned PCP    The following health maintenance items are reviewed in Epic and correct as of today:  Health Maintenance   Topic Date Due     ANNUAL REVIEW OF HM ORDERS  1950     MAMMO SCREENING  2021     DEXA  2021     MEDICARE ANNUAL WELLNESS VISIT  10/27/2021     FALL RISK ASSESSMENT  10/27/2021     DTAP/TDAP/TD IMMUNIZATION (6 - Td) 2023     LIPID  2024     COLORECTAL CANCER SCREENING  2024     ADVANCE CARE PLANNING  10/27/2025     PHQ-2  Completed      "INFLUENZA VACCINE  Completed     Pneumococcal Vaccine: 65+ Years  Completed     ZOSTER IMMUNIZATION  Completed     Pneumococcal Vaccine: Pediatrics (0 to 5 Years) and At-Risk Patients (6 to 64 Years)  Aged Out     IPV IMMUNIZATION  Aged Out     MENINGITIS IMMUNIZATION  Aged Out     HEPATITIS B IMMUNIZATION  Aged Out     HEPATITIS C SCREENING  Discontinued           Review of Systems   Constitutional: Negative for chills and fever.   HENT: Positive for congestion. Negative for ear pain, hearing loss and sore throat.    Eyes: Negative for pain and visual disturbance.   Respiratory: Positive for cough. Negative for shortness of breath.    Cardiovascular: Negative for chest pain, palpitations and peripheral edema.   Gastrointestinal: Positive for abdominal pain and heartburn. Negative for constipation, diarrhea, hematochezia and nausea.   Breasts:  Negative for tenderness, breast mass and discharge.   Genitourinary: Negative for dysuria, frequency, genital sores, hematuria, pelvic pain, urgency, vaginal bleeding and vaginal discharge.   Musculoskeletal: Positive for arthralgias, joint swelling and myalgias.   Skin: Negative for rash.   Neurological: Positive for weakness. Negative for dizziness, headaches and paresthesias.   Psychiatric/Behavioral: Positive for mood changes. The patient is nervous/anxious.      Constitutional, HEENT, cardiovascular, pulmonary, gi and gu systems are negative, except as otherwise noted.    OBJECTIVE:   BP (!) 142/90   Pulse 60   Temp 97.7  F (36.5  C) (Tympanic)   Resp 18   Wt 65.8 kg (145 lb)   SpO2 99%   BMI 23.40 kg/m   Estimated body mass index is 23.4 kg/m  as calculated from the following:    Height as of 1/9/20: 1.676 m (5' 6\").    Weight as of this encounter: 65.8 kg (145 lb).  Physical Exam  GENERAL APPEARANCE: healthy, alert and no distress  EYES: Eyes grossly normal to inspection, PERRL and conjunctivae and sclerae normal  HENT: ear canals and TM's normal, nose and mouth " without ulcers or lesions, oropharynx clear and oral mucous membranes moist  NECK: no adenopathy, no asymmetry, masses, or scars and thyroid normal to palpation  RESP: lungs clear to auscultation - no rales, rhonchi or wheezes  CV: regular rate and rhythm, normal S1 S2, no S3 or S4, no murmur, click or rub, no peripheral edema and peripheral pulses strong  ABDOMEN: soft, nontender, no hepatosplenomegaly, no masses and bowel sounds normal  MS: no musculoskeletal defects are noted and gait is age appropriate without ataxia  SKIN: no suspicious lesions or rashes  NEURO: Normal strength and tone, sensory exam grossly normal, mentation intact and speech normal  PSYCH: mentation appears normal and affect normal/bright    Diagnostic Test Results:  Labs reviewed in Epic    ASSESSMENT / PLAN:   1. Encounter for Medicare annual wellness exam  Up to date on colonoscopy, mammogram, vaccines.     2. Gastroesophageal reflux disease with esophagitis without hemorrhage  Recommended avoiding triggers, can use famotidine as needed, and omeprazole as needed if pepcid is not working.   - famotidine (PEPCID) 40 MG tablet; Take 1 tablet (40 mg) by mouth 2 times daily as needed for heartburn  Dispense: 90 tablet; Refill: 3    3. Essential hypertension  Stable. Check CMP>   - Comprehensive metabolic panel (BMP + Alb, Alk Phos, ALT, AST, Total. Bili, TP); Future    4. Hypercholesterolemia  Stable. Check lipids.   - Lipid panel reflex to direct LDL Fasting; Future    5. Hypothyroidism due to Hashimoto's thyroiditis  Stable. [place] TSH.   - TSH with free T4 reflex; Future    6. Essential tremor  Stable.         COUNSELING:  Reviewed preventive health counseling, as reflected in patient instructions       Regular exercise       Healthy diet/nutrition       Immunizations    Flu shot already done.            Osteoporosis Prevention/Bone Health    Estimated body mass index is 23.4 kg/m  as calculated from the following:    Height as of 1/9/20:  "1.676 m (5' 6\").    Weight as of this encounter: 65.8 kg (145 lb).        She reports that she quit smoking about 17 years ago. Her smoking use included cigarettes. She has a 15.00 pack-year smoking history. She has never used smokeless tobacco.      Appropriate preventive services were discussed with this patient, including applicable screening as appropriate for cardiovascular disease, diabetes, osteopenia/osteoporosis, and glaucoma.  As appropriate for age/gender, discussed screening for colorectal cancer, prostate cancer, breast cancer, and cervical cancer. Checklist reviewing preventive services available has been given to the patient.    Reviewed patients plan of care and provided an AVS. The Basic Care Plan (routine screening as documented in Health Maintenance) for Mily meets the Care Plan requirement. This Care Plan has been established and reviewed with the Patient.    Counseling Resources:  ATP IV Guidelines  Pooled Cohorts Equation Calculator  Breast Cancer Risk Calculator  Breast Cancer: Medication to Reduce Risk  FRAX Risk Assessment  ICSI Preventive Guidelines  Dietary Guidelines for Americans, 2010  Personal [employer]'s MyPlate  ASA Prophylaxis  Lung CA Screening    Lalitha Gómez MD  [facility]    Identified Health Risks:  "

## 2020-11-12 DIAGNOSIS — I10 ESSENTIAL HYPERTENSION: ICD-10-CM

## 2020-11-12 DIAGNOSIS — E06.3 HYPOTHYROIDISM DUE TO HASHIMOTO'S THYROIDITIS: ICD-10-CM

## 2020-11-12 DIAGNOSIS — E78.00 HYPERCHOLESTEROLEMIA: ICD-10-CM

## 2020-11-12 LAB
ALBUMIN SERPL-MCNC: 4 G/DL (ref 3.4–5)
ALP SERPL-CCNC: 68 U/L (ref 40–150)
ALT SERPL W P-5'-P-CCNC: 54 U/L (ref 0–50)
ANION GAP SERPL CALCULATED.3IONS-SCNC: 7 MMOL/L (ref 3–14)
AST SERPL W P-5'-P-CCNC: 63 U/L (ref 0–45)
BILIRUB SERPL-MCNC: 0.5 MG/DL (ref 0.2–1.3)
BUN SERPL-MCNC: 8 MG/DL (ref 7–30)
CALCIUM SERPL-MCNC: 9.5 MG/DL (ref 8.5–10.1)
CHLORIDE SERPL-SCNC: 100 MMOL/L (ref 94–109)
CHOLEST SERPL-MCNC: 260 MG/DL
CO2 SERPL-SCNC: 25 MMOL/L (ref 20–32)
CREAT SERPL-MCNC: 0.69 MG/DL (ref 0.52–1.04)
GFR SERPL CREATININE-BSD FRML MDRD: 87 ML/MIN/{1.73_M2}
GLUCOSE SERPL-MCNC: 104 MG/DL (ref 70–99)
HDLC SERPL-MCNC: 68 MG/DL
LDLC SERPL CALC-MCNC: 166 MG/DL
NONHDLC SERPL-MCNC: 192 MG/DL
POTASSIUM SERPL-SCNC: 4.3 MMOL/L (ref 3.4–5.3)
PROT SERPL-MCNC: 7.7 G/DL (ref 6.8–8.8)
SODIUM SERPL-SCNC: 132 MMOL/L (ref 133–144)
TRIGL SERPL-MCNC: 132 MG/DL
TSH SERPL DL<=0.005 MIU/L-ACNC: 1.14 MU/L (ref 0.4–4)

## 2020-11-12 PROCEDURE — 80053 COMPREHEN METABOLIC PANEL: CPT | Performed by: INTERNAL MEDICINE

## 2020-11-12 PROCEDURE — 36415 COLL VENOUS BLD VENIPUNCTURE: CPT | Performed by: INTERNAL MEDICINE

## 2020-11-12 PROCEDURE — 80061 LIPID PANEL: CPT | Performed by: INTERNAL MEDICINE

## 2020-11-12 PROCEDURE — 84443 ASSAY THYROID STIM HORMONE: CPT | Performed by: INTERNAL MEDICINE

## 2020-12-03 ENCOUNTER — E-VISIT (OUTPATIENT)
Dept: PEDIATRICS | Facility: CLINIC | Age: 70
End: 2020-12-03
Payer: COMMERCIAL

## 2020-12-03 DIAGNOSIS — R30.0 DYSURIA: Primary | ICD-10-CM

## 2020-12-03 PROCEDURE — 99421 OL DIG E/M SVC 5-10 MIN: CPT | Performed by: INTERNAL MEDICINE

## 2020-12-03 RX ORDER — SULFAMETHOXAZOLE/TRIMETHOPRIM 800-160 MG
1 TABLET ORAL 2 TIMES DAILY
Qty: 14 TABLET | Refills: 0 | Status: SHIPPED | OUTPATIENT
Start: 2020-12-03 | End: 2020-12-10

## 2020-12-03 NOTE — TELEPHONE ENCOUNTER
Called and spoke with patient to schedule lab appointment.  Patient states she has difficulty urinating on a normal basis, and it is also difficult for her to get out (transportation/concerns for exposure).  Discussed with Dr. Gómez.  Dr. Gómez will send prescription for 7-day course of antibiotics to patient's local pharmacy.  Dr. Gómez would like patient to call on Monday to update us on how she is doing symptomatically.  Reviewed Dr. Gómez's recommendations and that Rx has been sent.  Patient verbalized understanding, and will call back Monday with an update.  Upon return call, please route patient to station D to provide update.    Nita Castellano

## 2020-12-03 NOTE — PATIENT INSTRUCTIONS
Thank you for choosing us for your care. Given your symptoms, I would like you to do a lab-only visit to determine what is causing them.  I have placed the orders.  Please schedule an appointment with the lab right here in [employer], or call [phone].  I will let you know when the results are back and next steps to take.  Thank you for choosing us for your care. Based on your symptoms and length of illness, I do not think that you need a prescription at this time.  I'd like you to get a lab test and based on those results, you may need antibiotics.    If you re not feeling better within 2-3 days, please respond to this message and we can consider if a prescription is needed.  You can schedule an appointment right here in [employer], or call [phone]  If the visit is for the same symptoms as your e-visit, we ll refund the cost of your e-visit if seen within seven days.

## 2020-12-24 DIAGNOSIS — I10 ESSENTIAL HYPERTENSION: ICD-10-CM

## 2020-12-24 RX ORDER — LISINOPRIL/HYDROCHLOROTHIAZIDE 10-12.5 MG
TABLET ORAL
Qty: 90 TABLET | Refills: 1 | Status: SHIPPED | OUTPATIENT
Start: 2020-12-24 | End: 2021-07-06

## 2020-12-24 NOTE — TELEPHONE ENCOUNTER
Routing refill request to provider for review/approval because:  Labs out of range:  Na  Elevated BP    Cristal Herring RN on 12/24/2020 at 10:02 AM

## 2021-02-06 DIAGNOSIS — G25.0 ESSENTIAL TREMOR: ICD-10-CM

## 2021-02-06 DIAGNOSIS — I10 ESSENTIAL HYPERTENSION: ICD-10-CM

## 2021-02-08 RX ORDER — PROPRANOLOL HCL 60 MG
CAPSULE, EXTENDED RELEASE 24HR ORAL
Qty: 90 CAPSULE | Refills: 2 | Status: SHIPPED | OUTPATIENT
Start: 2021-02-08 | End: 2021-12-06

## 2021-02-08 NOTE — TELEPHONE ENCOUNTER
Prescription approved per [facility] Refill Protocol.  Alpa Toledo RN, BSN  Message handled by CLINIC NURSE.

## 2021-03-02 ENCOUNTER — IMMUNIZATION (OUTPATIENT)
Dept: FAMILY MEDICINE | Facility: CLINIC | Age: 71
End: 2021-03-02
Payer: COMMERCIAL

## 2021-03-02 PROCEDURE — 0001A PR COVID VAC PFIZER DIL RECON 30 MCG/0.3 ML IM: CPT

## 2021-03-02 PROCEDURE — 91300 PR COVID VAC PFIZER DIL RECON 30 MCG/0.3 ML IM: CPT

## 2021-03-23 ENCOUNTER — IMMUNIZATION (OUTPATIENT)
Dept: FAMILY MEDICINE | Facility: CLINIC | Age: 71
End: 2021-03-23
Attending: FAMILY MEDICINE
Payer: COMMERCIAL

## 2021-03-23 PROCEDURE — 91300 PR COVID VAC PFIZER DIL RECON 30 MCG/0.3 ML IM: CPT

## 2021-03-23 PROCEDURE — 0002A PR COVID VAC PFIZER DIL RECON 30 MCG/0.3 ML IM: CPT

## 2021-05-25 ENCOUNTER — RECORDS - HEALTHEAST (OUTPATIENT)
Dept: ADMINISTRATIVE | Facility: CLINIC | Age: 71
End: 2021-05-25

## 2021-06-15 DIAGNOSIS — Z12.31 VISIT FOR SCREENING MAMMOGRAM: ICD-10-CM

## 2021-06-15 PROCEDURE — 77067 SCR MAMMO BI INCL CAD: CPT | Mod: TC | Performed by: RADIOLOGY

## 2021-07-04 DIAGNOSIS — I10 ESSENTIAL HYPERTENSION: ICD-10-CM

## 2021-07-06 RX ORDER — LISINOPRIL/HYDROCHLOROTHIAZIDE 10-12.5 MG
1 TABLET ORAL DAILY
Qty: 90 TABLET | Refills: 1 | Status: SHIPPED | OUTPATIENT
Start: 2021-07-06 | End: 2022-01-25

## 2021-07-06 NOTE — TELEPHONE ENCOUNTER
Routing refill request to provider for review/approval because:  Labs out of range:  CR  Elevated BP    Cristal Herring RN on 7/6/2021 at 9:29 AM

## 2021-12-04 DIAGNOSIS — G25.0 ESSENTIAL TREMOR: ICD-10-CM

## 2021-12-04 DIAGNOSIS — I10 ESSENTIAL HYPERTENSION: ICD-10-CM

## 2021-12-06 RX ORDER — PROPRANOLOL HCL 60 MG
CAPSULE, EXTENDED RELEASE 24HR ORAL
Qty: 90 CAPSULE | Refills: 0 | Status: SHIPPED | OUTPATIENT
Start: 2021-12-06 | End: 2022-01-25

## 2021-12-06 NOTE — TELEPHONE ENCOUNTER
Routing refill request to provider for review/approval because:  Patient needs to be seen because it has been more than 1 year since last office visit.  Failing bp    Elsie Corbett RN

## 2021-12-19 ENCOUNTER — HEALTH MAINTENANCE LETTER (OUTPATIENT)
Age: 71
End: 2021-12-19

## 2022-01-25 ENCOUNTER — TELEPHONE (OUTPATIENT)
Dept: PEDIATRICS | Facility: CLINIC | Age: 72
End: 2022-01-25
Payer: COMMERCIAL

## 2022-01-25 DIAGNOSIS — G25.0 ESSENTIAL TREMOR: ICD-10-CM

## 2022-01-25 DIAGNOSIS — E06.3 HYPOTHYROIDISM DUE TO HASHIMOTO'S THYROIDITIS: ICD-10-CM

## 2022-01-25 DIAGNOSIS — I10 ESSENTIAL HYPERTENSION: ICD-10-CM

## 2022-01-25 RX ORDER — PROPRANOLOL HCL 60 MG
60 CAPSULE, EXTENDED RELEASE 24HR ORAL DAILY
Qty: 90 CAPSULE | Refills: 0 | Status: SHIPPED | OUTPATIENT
Start: 2022-01-25 | End: 2022-02-09

## 2022-01-25 RX ORDER — LISINOPRIL/HYDROCHLOROTHIAZIDE 10-12.5 MG
1 TABLET ORAL DAILY
Qty: 90 TABLET | Refills: 0 | Status: SHIPPED | OUTPATIENT
Start: 2022-01-25 | End: 2022-02-09

## 2022-01-25 RX ORDER — LEVOTHYROXINE SODIUM 112 UG/1
112 TABLET ORAL DAILY
Qty: 90 TABLET | Refills: 0 | Status: SHIPPED | OUTPATIENT
Start: 2022-01-25 | End: 2022-02-10

## 2022-01-25 NOTE — TELEPHONE ENCOUNTER
Fax received from [facility] pharmacy requesting refills on Lisinopril-HCTZ, Levothyroxine & Propranolol. Looks like pt & spouse switching to a new  Pharmacy.     Sent 3 months supply on all 3 meds to last till her upcoming visit.     Zestoretic 10-12.5 mg daily  Last Written Prescription Date:  7/6/21  Last Fill Quantity: 90,  # refills: 1   Levothyroxine 112 mcg  Last Written Prescription Date:  1/11/22  Last Fill Quantity: 90,  # refills: 0   Propranolol 60 mg daily  Last Written Prescription Date:  12/6/21  Last Fill Quantity: 90,  # refills: 0   Last office visit: 10/27/2020 with prescribing provider:  Dr.Emily Gómez   Future Office Visit:   Next 5 appointments (look out 90 days)    Feb 09, 2022 10:30 AM  (Arrive by 10:10 AM)  Annual Wellness Visit with Meri Pacheco MD  [facility] ([facility] ) [address] [place] [address]  [address] [place] [id number] [phone]         James, RN  Patient Advocate Liason (PAL)  [facility]

## 2022-02-06 SDOH — ECONOMIC STABILITY: INCOME INSECURITY: IN THE LAST 12 MONTHS, WAS THERE A TIME WHEN YOU WERE NOT ABLE TO PAY THE MORTGAGE OR RENT ON TIME?: NO

## 2022-02-06 SDOH — ECONOMIC STABILITY: TRANSPORTATION INSECURITY
IN THE PAST 12 MONTHS, HAS THE LACK OF TRANSPORTATION KEPT YOU FROM MEDICAL APPOINTMENTS OR FROM GETTING MEDICATIONS?: NO

## 2022-02-06 SDOH — ECONOMIC STABILITY: TRANSPORTATION INSECURITY
IN THE PAST 12 MONTHS, HAS LACK OF TRANSPORTATION KEPT YOU FROM MEETINGS, WORK, OR FROM GETTING THINGS NEEDED FOR DAILY LIVING?: NO

## 2022-02-06 SDOH — ECONOMIC STABILITY: FOOD INSECURITY: WITHIN THE PAST 12 MONTHS, YOU WORRIED THAT YOUR FOOD WOULD RUN OUT BEFORE YOU GOT MONEY TO BUY MORE.: NEVER TRUE

## 2022-02-06 SDOH — ECONOMIC STABILITY: FOOD INSECURITY: WITHIN THE PAST 12 MONTHS, THE FOOD YOU BOUGHT JUST DIDN'T LAST AND YOU DIDN'T HAVE MONEY TO GET MORE.: NEVER TRUE

## 2022-02-06 SDOH — ECONOMIC STABILITY: INCOME INSECURITY: HOW HARD IS IT FOR YOU TO PAY FOR THE VERY BASICS LIKE FOOD, HOUSING, MEDICAL CARE, AND HEATING?: NOT HARD AT ALL

## 2022-02-06 ASSESSMENT — SOCIAL DETERMINANTS OF HEALTH (SDOH)
HOW OFTEN DO YOU GET TOGETHER WITH FRIENDS OR RELATIVES?: TWICE A WEEK
DO YOU BELONG TO ANY CLUBS OR ORGANIZATIONS SUCH AS CHURCH GROUPS UNIONS, FRATERNAL OR ATHLETIC GROUPS, OR SCHOOL GROUPS?: YES
HOW OFTEN DO YOU ATTEND CHURCH OR RELIGIOUS SERVICES?: MORE THAN 4 TIMES PER YEAR
IN A TYPICAL WEEK, HOW MANY TIMES DO YOU TALK ON THE PHONE WITH FAMILY, FRIENDS, OR NEIGHBORS?: MORE THAN THREE TIMES A WEEK

## 2022-02-06 ASSESSMENT — ENCOUNTER SYMPTOMS
DIARRHEA: 0
HEMATOCHEZIA: 0
JOINT SWELLING: 1
BREAST MASS: 0
PARESTHESIAS: 0
WEAKNESS: 0
PALPITATIONS: 0
NAUSEA: 0
ABDOMINAL PAIN: 0
HEMATURIA: 0
SORE THROAT: 0
NERVOUS/ANXIOUS: 0
HEARTBURN: 0
HEADACHES: 0
COUGH: 0
FEVER: 0
DIZZINESS: 0
SHORTNESS OF BREATH: 0
CHILLS: 0
ARTHRALGIAS: 0
CONSTIPATION: 0
FREQUENCY: 0
DYSURIA: 0
MYALGIAS: 0
EYE PAIN: 0

## 2022-02-06 ASSESSMENT — LIFESTYLE VARIABLES
HOW OFTEN DO YOU HAVE A DRINK CONTAINING ALCOHOL: 4 OR MORE TIMES A WEEK
HOW MANY STANDARD DRINKS CONTAINING ALCOHOL DO YOU HAVE ON A TYPICAL DAY: 1 OR 2
HOW OFTEN DO YOU HAVE SIX OR MORE DRINKS ON ONE OCCASION: NEVER

## 2022-02-06 ASSESSMENT — ACTIVITIES OF DAILY LIVING (ADL): CURRENT_FUNCTION: NO ASSISTANCE NEEDED

## 2022-02-09 ENCOUNTER — OFFICE VISIT (OUTPATIENT)
Dept: PEDIATRICS | Facility: CLINIC | Age: 72
End: 2022-02-09
Payer: COMMERCIAL

## 2022-02-09 VITALS
WEIGHT: 145.74 LBS | HEART RATE: 63 BPM | BODY MASS INDEX: 23.42 KG/M2 | OXYGEN SATURATION: 100 % | RESPIRATION RATE: 12 BRPM | TEMPERATURE: 98.7 F | SYSTOLIC BLOOD PRESSURE: 150 MMHG | DIASTOLIC BLOOD PRESSURE: 78 MMHG | HEIGHT: 66 IN

## 2022-02-09 DIAGNOSIS — I10 ESSENTIAL HYPERTENSION: ICD-10-CM

## 2022-02-09 DIAGNOSIS — E78.00 PURE HYPERCHOLESTEROLEMIA: ICD-10-CM

## 2022-02-09 DIAGNOSIS — R74.8 ELEVATED LIVER ENZYMES: ICD-10-CM

## 2022-02-09 DIAGNOSIS — Z78.0 ASYMPTOMATIC MENOPAUSAL STATE: ICD-10-CM

## 2022-02-09 DIAGNOSIS — Z12.31 ENCOUNTER FOR SCREENING MAMMOGRAM FOR BREAST CANCER: ICD-10-CM

## 2022-02-09 DIAGNOSIS — Z00.00 ENCOUNTER FOR MEDICARE ANNUAL WELLNESS EXAM: Primary | ICD-10-CM

## 2022-02-09 DIAGNOSIS — E06.3 HYPOTHYROIDISM DUE TO HASHIMOTO'S THYROIDITIS: ICD-10-CM

## 2022-02-09 DIAGNOSIS — G25.0 ESSENTIAL TREMOR: ICD-10-CM

## 2022-02-09 LAB — HBA1C MFR BLD: 6.1 % (ref 0–5.6)

## 2022-02-09 PROCEDURE — 99397 PER PM REEVAL EST PAT 65+ YR: CPT | Performed by: INTERNAL MEDICINE

## 2022-02-09 PROCEDURE — 84443 ASSAY THYROID STIM HORMONE: CPT | Performed by: INTERNAL MEDICINE

## 2022-02-09 PROCEDURE — 84439 ASSAY OF FREE THYROXINE: CPT | Performed by: INTERNAL MEDICINE

## 2022-02-09 PROCEDURE — 80053 COMPREHEN METABOLIC PANEL: CPT | Performed by: INTERNAL MEDICINE

## 2022-02-09 PROCEDURE — 83036 HEMOGLOBIN GLYCOSYLATED A1C: CPT | Performed by: INTERNAL MEDICINE

## 2022-02-09 PROCEDURE — 36415 COLL VENOUS BLD VENIPUNCTURE: CPT | Performed by: INTERNAL MEDICINE

## 2022-02-09 PROCEDURE — 99214 OFFICE O/P EST MOD 30 MIN: CPT | Mod: 25 | Performed by: INTERNAL MEDICINE

## 2022-02-09 RX ORDER — ATORVASTATIN CALCIUM 20 MG/1
20 TABLET, FILM COATED ORAL DAILY
Qty: 90 TABLET | Refills: 3 | Status: SHIPPED | OUTPATIENT
Start: 2022-02-09 | End: 2023-02-03

## 2022-02-09 RX ORDER — PROPRANOLOL HYDROCHLORIDE 120 MG/1
120 CAPSULE, EXTENDED RELEASE ORAL DAILY
Qty: 90 CAPSULE | Refills: 1 | Status: SHIPPED | OUTPATIENT
Start: 2022-02-09 | End: 2022-02-23 | Stop reason: DRUGHIGH

## 2022-02-09 RX ORDER — LISINOPRIL/HYDROCHLOROTHIAZIDE 10-12.5 MG
1 TABLET ORAL DAILY
Qty: 90 TABLET | Refills: 3 | Status: SHIPPED | OUTPATIENT
Start: 2022-02-09 | End: 2022-02-14

## 2022-02-09 RX ORDER — MULTIVIT-MIN/IRON/FOLIC ACID/K 18-600-40
CAPSULE ORAL
COMMUNITY

## 2022-02-09 ASSESSMENT — ENCOUNTER SYMPTOMS
SHORTNESS OF BREATH: 0
CONSTIPATION: 0
PALPITATIONS: 0
HEARTBURN: 0
ARTHRALGIAS: 0
DIARRHEA: 0
PARESTHESIAS: 0
COUGH: 0
JOINT SWELLING: 1
HEADACHES: 0
ABDOMINAL PAIN: 0
FREQUENCY: 0
CHILLS: 0
NAUSEA: 0
SORE THROAT: 0
DIZZINESS: 0
NERVOUS/ANXIOUS: 0
HEMATOCHEZIA: 0
DYSURIA: 0
FEVER: 0
BREAST MASS: 0
MYALGIAS: 0
HEMATURIA: 0
EYE PAIN: 0
WEAKNESS: 0

## 2022-02-09 ASSESSMENT — ACTIVITIES OF DAILY LIVING (ADL): CURRENT_FUNCTION: NO ASSISTANCE NEEDED

## 2022-02-09 ASSESSMENT — MIFFLIN-ST. JEOR: SCORE: 1195.07

## 2022-02-09 NOTE — PROGRESS NOTES
"SUBJECTIVE:   Mily Hilliard is a 71 year old female who presents for Preventive Visit.      Are you in the first 12 months of your Medicare coverage?  No    Healthy Habits:     In general, how would you rate your overall health?  Good    Frequency of exercise:  2-3 days/week    Duration of exercise:  15-30 minutes    Do you usually eat at least 4 servings of fruit and vegetables a day, include whole grains    & fiber and avoid regularly eating high fat or \"junk\" foods?  No    Taking medications regularly:  Yes    Medication side effects:  None    Ability to successfully perform activities of daily living:  No assistance needed    Home Safety:  No safety concerns identified    Hearing Impairment:  Difficulty following a conversation in a noisy restaurant or crowded room    In the past 6 months, have you been bothered by leaking of urine?  No    In general, how would you rate your overall mental or emotional health?  Good      PHQ-2 Total Score: 0    Additional concerns today:  No    Do you feel safe in your environment? Yes    Have you ever done Advance Care Planning? (For example, a Health Directive, POLST, or a discussion with a medical provider or your loved ones about your wishes): Yes, patient states has an Advance Care Planning document and will bring a copy to the clinic.       Fall risk  Fallen 2 or more times in the past year?: No  Any fall with injury in the past year?: No    Cognitive Screening   1) Repeat 3 items (Leader, Season, Table)    2) Clock draw: NORMAL  3) 3 item recall: Recalls 3 objects  Results: 3 items recalled: COGNITIVE IMPAIRMENT LESS LIKELY    Mini-CogTM Copyright RAFIA Mckinnon. Licensed by the author for use in [facility]; reprinted with permission (samuel@.[facility]). All rights reserved.      Has been noticing more tremors. Wondering about going up on her propranolol dosing.     BPs have been 135-140/70-78. No issues with chest pain or SOB. Has been able to be more active again " recently in [employer].     Has been taking omeprazole daily OTC for acid reflux. Seems to help.       Do you have sleep apnea, excessive snoring or daytime drowsiness?: no    Reviewed and updated as needed this visit by clinical staff  Tobacco  Allergies  Meds   Med Hx  Surg Hx  Fam Hx  Soc Hx       Reviewed and updated as needed this visit by Provider    Meds            Social History     Tobacco Use     Smoking status: Former Smoker     Packs/day: 0.00     Years: 0.00     Pack years: 0.00     Types: Cigarettes     Quit date: 2003     Years since quittin.1     Smokeless tobacco: Never Used   Substance Use Topics     Alcohol use: Yes     Alcohol/week: 11.7 standard drinks     Comment: occ     If you drink alcohol do you typically have >3 drinks per day or >7 drinks per week? No    Alcohol Use 2022   Prescreen: >3 drinks/day or >7 drinks/week? No   Prescreen: >3 drinks/day or >7 drinks/week? -           Current providers sharing in care for this patient include:   Patient Care Team:  Meri Pacheco MD as PCP - General (Internal Medicine)  Lalitha Gómez MD as Assigned PCP    The following health maintenance items are reviewed in Epic and correct as of today:  Health Maintenance Due   Topic Date Due     ANNUAL REVIEW OF HM ORDERS  Never done     DEXA  2021     FALL RISK ASSESSMENT  10/27/2021     Patient Active Problem List   Diagnosis     Hypothyroidism     Hypertension     Hypercholesterolemia     Essential tremor     Past Surgical History:   Procedure Laterality Date     Bilateral Foot surgery       COLONOSCOPY  2014    Dr. Naylor [facility]     COLONOSCOPY N/A 2019    Procedure: COLONOSCOPY;  Surgeon: Catrachito Naylor MD;  Location:  GI     EYE SURGERY      cataract removal, macular hole repair     LUMPECTOMY BREAST      Right       Social History     Tobacco Use     Smoking status: Former Smoker     Packs/day: 0.00     Years: 0.00     Pack years: 0.00      "Types: Cigarettes     Quit date: 2003     Years since quittin.1     Smokeless tobacco: Never Used   Substance Use Topics     Alcohol use: Yes     Alcohol/week: 11.7 standard drinks     Comment: occ     Family History   Problem Relation Age of Onset     Cancer - colorectal Mother         at age 94     Breast Cancer Mother      Diabetes Sister         pre-diabetic     Breast Cancer Sister      Macular Degeneration Father      Bladder Cancer Father      Stomach Cancer Maternal Grandfather      Diabetes Sister      Breast Cancer Sister                  Review of Systems   Constitutional: Negative for chills and fever.   HENT: Negative for congestion, ear pain, hearing loss and sore throat.    Eyes: Negative for pain and visual disturbance.   Respiratory: Negative for cough and shortness of breath.    Cardiovascular: Negative for chest pain, palpitations and peripheral edema.   Gastrointestinal: Negative for abdominal pain, constipation, diarrhea, heartburn, hematochezia and nausea.   Breasts:  Negative for tenderness, breast mass and discharge.   Genitourinary: Negative for dysuria, frequency, genital sores, hematuria, pelvic pain, urgency, vaginal bleeding and vaginal discharge.   Musculoskeletal: Positive for joint swelling. Negative for arthralgias and myalgias.   Skin: Negative for rash.   Neurological: Negative for dizziness, weakness, headaches and paresthesias.   Psychiatric/Behavioral: Negative for mood changes. The patient is not nervous/anxious.          OBJECTIVE:   BP (!) 150/78 (BP Location: Right arm, Patient Position: Sitting, Cuff Size: Adult Regular)   Pulse 63   Temp 98.7  F (37.1  C) (Temporal)   Resp 12   Ht 1.688 m (5' 6.46\")   Wt 66.1 kg (145 lb 11.8 oz)   SpO2 100%   BMI 23.20 kg/m   Estimated body mass index is 23.2 kg/m  as calculated from the following:    Height as of this encounter: 1.688 m (5' 6.46\").    Weight as of this encounter: 66.1 kg (145 lb 11.8 oz).  Physical " Exam  GENERAL: healthy, alert and no distress  EYES: Eyes grossly normal to inspection, PERRL and conjunctivae and sclerae normal  HENT: ear canals and TM's normal, nose and mouth without ulcers or lesions  NECK: no adenopathy, no asymmetry, masses, or scars and thyroid normal to palpation  RESP: lungs clear to auscultation - no rales, rhonchi or wheezes  CV: regular rate and rhythm, normal S1 S2, no S3 or S4, no murmur, click or rub, no peripheral edema and peripheral pulses strong  ABDOMEN: soft, nontender, no hepatosplenomegaly, no masses and bowel sounds normal  MS: no gross musculoskeletal defects noted, no edema  SKIN: no suspicious lesions or rashes  NEURO: Normal strength and tone, mentation intact and speech normal  PSYCH: mentation appears normal, affect normal/bright      ASSESSMENT / PLAN:   1. Encounter for Medicare annual wellness exam  Counseling as below. Vaccines up to date.   - Hemoglobin A1c    2. Essential hypertension  BP elevated today, home readings are a bit lower in the 135-140 range. Will increase propranolol to help with essential tremor, may help slightly with BP. If it doesn't would plan to increase lisinopril component to 20mg daily. Will have RN follow-up in 2-3 weeks to make sure she is not having issues. Reviewed side effects, particularly low HR risk as her HR was lower today in the 60s.   - propranolol ER (INDERAL LA) 120 MG 24 hr capsule; Take 1 capsule (120 mg) by mouth daily  Dispense: 90 capsule; Refill: 1  - Comprehensive metabolic panel (BMP + Alb, Alk Phos, ALT, AST, Total. Bili, TP); Future  - lisinopril-hydrochlorothiazide (ZESTORETIC) 10-12.5 MG tablet; Take 1 tablet by mouth daily  Dispense: 90 tablet; Refill: 3  - Comprehensive metabolic panel (BMP + Alb, Alk Phos, ALT, AST, Total. Bili, TP)    3. Essential tremor  Recently worsening. Will try going up on propranolol as above but will need to monitor closely for bradycardia. Could consider primidone if not tolerating  "higher dose of beta blocker.   - propranolol ER (INDERAL LA) 120 MG 24 hr capsule; Take 1 capsule (120 mg) by mouth daily  Dispense: 90 capsule; Refill: 1    4. Asymptomatic menopausal state  Due for DEXA.   - DEXA HIP/PELVIS/SPINE - Future; Future    5. Pure hypercholesterolemia  Reviewed ASCVD risk based on prior labs; does qualify for statin use. Reviewed risks, benefits. Medication sent. Recheck labs in 3 months.   - atorvastatin (LIPITOR) 20 MG tablet; Take 1 tablet (20 mg) by mouth daily  Dispense: 90 tablet; Refill: 3  - Lipid Profile (Chol, Trig, HDL, LDL calc); Future  - ALT; Future    6. Hypothyroidism due to Hashimoto's thyroiditis  Due for labs.  - TSH with free T4 reflex    7. Encounter for screening mammogram for breast cancer  - *MA Screening Digital Bilateral; Future        COUNSELING:  Reviewed preventive health counseling, as reflected in patient instructions       Regular exercise       Healthy diet/nutrition       Vision screening       Hearing screening       Osteoporosis prevention/bone health       Colon cancer screening    Estimated body mass index is 23.2 kg/m  as calculated from the following:    Height as of this encounter: 1.688 m (5' 6.46\").    Weight as of this encounter: 66.1 kg (145 lb 11.8 oz).        She reports that she quit smoking about 19 years ago. Her smoking use included cigarettes. She smoked 0.00 packs per day for 0.00 years. She has never used smokeless tobacco.      Appropriate preventive services were discussed with this patient, including applicable screening as appropriate for cardiovascular disease, diabetes, osteopenia/osteoporosis, and glaucoma.  As appropriate for age/gender, discussed screening for colorectal cancer, prostate cancer, breast cancer, and cervical cancer. Checklist reviewing preventive services available has been given to the patient.    Reviewed patients plan of care and provided an AVS. The Basic Care Plan (routine screening as documented in Health " Maintenance) for Mily meets the Care Plan requirement. This Care Plan has been established and reviewed with the Patient.    Counseling Resources:  ATP IV Guidelines  Pooled Cohorts Equation Calculator  Breast Cancer Risk Calculator  Breast Cancer: Medication to Reduce Risk  FRAX Risk Assessment  ICSI Preventive Guidelines  Dietary Guidelines for Americans, 2010  USDA's MyPlate  ASA Prophylaxis  Lung CA Screening    Meri Collado MD  [facility]    Identified Health Risks:

## 2022-02-09 NOTE — Clinical Note
Can we call patient in 2-3 weeks to see how things are going on the higher dose of propranolol and check for side effects/low HR? Can we also follow-up on her blood pressure? If still elevated, would go up to lisinopril-hydrochlorothiazide 20-12.5mg dose.

## 2022-02-09 NOTE — PATIENT INSTRUCTIONS
Tremor:  Increase propranolol to 120mg - new prescription is sent. Keep an eye on BP and heart rate - this can cause your heart rate to slow, which could cause dizziness or lightheadedness.     Blood pressure:  Slightly elevated today, we'll keep an eye on this with the higher propranolol dose. Can always increase your BP slightly if needed.     Schedule bone density test, mammogram this spring.     Start atorvastatin for cholesterol. Come back in 3 months for a lab visit to recheck levels. Let me know if you have any issues with muscle aches.     Medications refilled - I will send in levothyroxine once levels are back.   Patient Education   Personalized Prevention Plan  You are due for the preventive services outlined below.  Your care team is available to assist you in scheduling these services.  If you have already completed any of these items, please share that information with your care team to update in your medical record.  Health Maintenance Due   Topic Date Due     ANNUAL REVIEW OF HM ORDERS  Never done     LUNG CANCER SCREENING  Never done     Osteoporosis Screening  06/14/2021     Annual Wellness Visit  10/27/2021     FALL RISK ASSESSMENT  10/27/2021

## 2022-02-10 LAB
ALBUMIN SERPL-MCNC: 3.6 G/DL (ref 3.4–5)
ALP SERPL-CCNC: 67 U/L (ref 40–150)
ALT SERPL W P-5'-P-CCNC: 55 U/L (ref 0–50)
ANION GAP SERPL CALCULATED.3IONS-SCNC: 3 MMOL/L (ref 3–14)
AST SERPL W P-5'-P-CCNC: 90 U/L (ref 0–45)
BILIRUB SERPL-MCNC: 0.5 MG/DL (ref 0.2–1.3)
BUN SERPL-MCNC: 11 MG/DL (ref 7–30)
CALCIUM SERPL-MCNC: 9.1 MG/DL (ref 8.5–10.1)
CHLORIDE BLD-SCNC: 101 MMOL/L (ref 94–109)
CO2 SERPL-SCNC: 27 MMOL/L (ref 20–32)
CREAT SERPL-MCNC: 0.6 MG/DL (ref 0.52–1.04)
GFR SERPL CREATININE-BSD FRML MDRD: >90 ML/MIN/1.73M2
GLUCOSE BLD-MCNC: 119 MG/DL (ref 70–99)
POTASSIUM BLD-SCNC: 5.2 MMOL/L (ref 3.4–5.3)
PROT SERPL-MCNC: 7.3 G/DL (ref 6.8–8.8)
SODIUM SERPL-SCNC: 131 MMOL/L (ref 133–144)
T4 FREE SERPL-MCNC: 1.38 NG/DL (ref 0.76–1.46)
TSH SERPL DL<=0.005 MIU/L-ACNC: 0.21 MU/L (ref 0.4–4)

## 2022-02-10 RX ORDER — LEVOTHYROXINE SODIUM 100 UG/1
100 TABLET ORAL DAILY
Qty: 90 TABLET | Refills: 0 | Status: SHIPPED | OUTPATIENT
Start: 2022-02-10 | End: 2022-03-28 | Stop reason: DRUGHIGH

## 2022-02-14 ENCOUNTER — TELEPHONE (OUTPATIENT)
Dept: PEDIATRICS | Facility: CLINIC | Age: 72
End: 2022-02-14
Payer: COMMERCIAL

## 2022-02-14 DIAGNOSIS — I10 PRIMARY HYPERTENSION: Primary | ICD-10-CM

## 2022-02-14 DIAGNOSIS — R30.0 DYSURIA: ICD-10-CM

## 2022-02-14 RX ORDER — SULFAMETHOXAZOLE/TRIMETHOPRIM 800-160 MG
1 TABLET ORAL 2 TIMES DAILY
Qty: 14 TABLET | Refills: 0 | Status: SHIPPED | OUTPATIENT
Start: 2022-02-14 | End: 2022-02-21

## 2022-02-14 RX ORDER — LISINOPRIL 20 MG/1
20 TABLET ORAL DAILY
Qty: 90 TABLET | Refills: 1 | Status: SHIPPED | OUTPATIENT
Start: 2022-02-14 | End: 2022-02-28

## 2022-02-14 NOTE — TELEPHONE ENCOUNTER
Pt calling back. Went over the lab result notes.    * Pt agrees to start the decreased dose of levothyroxine. Scheduled lab-only appointment on 3/23 to recheck TSH.      * She denies increased alcohol intake(doesn't want to say how much she consumes in a week). She is already scheduled for the  on 2/21/22.     * Willing to try increased dose of lisinopril w/o hydrochlorothiazide. Pended lisinopril 20 mg to take with propranolol 120 mg daily.     If you agree with the lisinopril 20 mg daily, please sign the pending med. Also, need to discontinue the zestoretic 10-12.5 mg from the med list. Thanks.    James RN  Patient Advocate Liason (PAL)  [facility]

## 2022-02-14 NOTE — TELEPHONE ENCOUNTER
Called pt again, spouse(Stan) says that she is taking a nap now, he will ask her to call me back when she is up.     Will await for her call back.    James RN  Patient Advocate Liason (PAL)  [facility]

## 2022-02-14 NOTE — TELEPHONE ENCOUNTER
Pt calling back to notify that she has UTI sx's. Also mentions that she gets many UTIs & the current sx's(feeling flushed, increased urgency, dysuria & unable to empty bladder) are the same sx's she gets every time with UTI. Sx's started yesterday, she has been pushing fluids. Denies any other UTI sx's. Requesting abx asap.     We treated her with bactrim bid x 7 days on 12/3/20 for dysuria.     Pended both Bactrim & UA. Please advise on plan of care.     James RN  Patient Advocate Liason (PAL)  [facility]

## 2022-02-14 NOTE — TELEPHONE ENCOUNTER
sent  message with ab result notes to pt on 2/10 & pt read the message on 2/11. But no response form pt yet.    So, called pt at [phone] to get clarification. Pt not available, so LM to call me back at [phone]. Will await for her response.    James, RN  Patient Advocate Liason (PAL)  [facility]

## 2022-02-14 NOTE — CONFIDENTIAL NOTE
Lisinopril 20mg sent to patient's pharmacy. Lisinopril-hydrochlorothiazide discontinued from her medication list.    Given UTI symptoms and no UA/UCx in our system, would recommend UA if patient is able. If not, then can treat pre-emptively with abx. Bactrim sent in to pharmacy. Typically 3 day course should be enough for uncomplicated cystitis, however, if symptoms are not fully resolved after 3 days she can continue for full 7 days.    Can we follow-up on patient's BP in 2 weeks with higher dose propranolol and new lisinopril dosing? Thanks!    Meri Pacheco MD  Internal Medicine-Pediatrics

## 2022-02-14 NOTE — TELEPHONE ENCOUNTER
Pt called back. She already started the abx this afternoon. So, advised her to continue abx as per 's recommendation & no need to come in to leave UA at this time. Pt agrees.    Pt agrees to switch to lisinopril 20 mg & continue propranolol 120 mg daily. She has a BP monitor at home, will check her BP 4-5 times a week.     I will call her in 2-3 weeks to get an update on her BP.    James RN  Patient Advocate Liason (PAL)  [facility]

## 2022-02-14 NOTE — TELEPHONE ENCOUNTER
Called pt, not available, so LM to call me back. Will await for her call back.    Need to go over 's recommendation & need to know whether she checks BP at home?    James, RN  Patient Advocate Liason (PAL)  [facility]

## 2022-02-21 ENCOUNTER — HOSPITAL ENCOUNTER (EMERGENCY)
Facility: CLINIC | Age: 72
Discharge: HOME | End: 2022-02-21
Attending: EMERGENCY MEDICINE | Admitting: EMERGENCY MEDICINE
Payer: COMMERCIAL

## 2022-02-21 ENCOUNTER — NURSE TRIAGE (OUTPATIENT)
Dept: NURSING | Facility: CLINIC | Age: 72
End: 2022-02-21
Payer: COMMERCIAL

## 2022-02-21 ENCOUNTER — HOSPITAL ENCOUNTER (OUTPATIENT)
Dept: ULTRASOUND IMAGING | Facility: CLINIC | Age: 72
Discharge: HOME | End: 2022-02-21
Attending: INTERNAL MEDICINE | Admitting: INTERNAL MEDICINE
Payer: COMMERCIAL

## 2022-02-21 VITALS
DIASTOLIC BLOOD PRESSURE: 92 MMHG | TEMPERATURE: 97.7 F | RESPIRATION RATE: 18 BRPM | HEART RATE: 49 BPM | OXYGEN SATURATION: 96 % | SYSTOLIC BLOOD PRESSURE: 185 MMHG

## 2022-02-21 DIAGNOSIS — R74.8 ELEVATED LIVER ENZYMES: ICD-10-CM

## 2022-02-21 DIAGNOSIS — R00.1 SINUS BRADYCARDIA: ICD-10-CM

## 2022-02-21 DIAGNOSIS — I10 ESSENTIAL HYPERTENSION: ICD-10-CM

## 2022-02-21 LAB
ANION GAP SERPL CALCULATED.3IONS-SCNC: 5 MMOL/L (ref 3–14)
BASOPHILS # BLD AUTO: 0.1 10E3/UL (ref 0–0.2)
BASOPHILS NFR BLD AUTO: 2 %
BUN SERPL-MCNC: 18 MG/DL (ref 7–30)
CALCIUM SERPL-MCNC: 9.4 MG/DL (ref 8.5–10.1)
CHLORIDE BLD-SCNC: 107 MMOL/L (ref 94–109)
CO2 SERPL-SCNC: 24 MMOL/L (ref 20–32)
CREAT SERPL-MCNC: 0.69 MG/DL (ref 0.52–1.04)
EOSINOPHIL # BLD AUTO: 0.2 10E3/UL (ref 0–0.7)
EOSINOPHIL NFR BLD AUTO: 4 %
ERYTHROCYTE [DISTWIDTH] IN BLOOD BY AUTOMATED COUNT: 12.1 % (ref 10–15)
GFR SERPL CREATININE-BSD FRML MDRD: >90 ML/MIN/1.73M2
GLUCOSE BLD-MCNC: 123 MG/DL (ref 70–99)
HCT VFR BLD AUTO: 39.2 % (ref 35–47)
HGB BLD-MCNC: 13.2 G/DL (ref 11.7–15.7)
HOLD SPECIMEN: NORMAL
IMM GRANULOCYTES # BLD: 0 10E3/UL
IMM GRANULOCYTES NFR BLD: 0 %
LYMPHOCYTES # BLD AUTO: 1.2 10E3/UL (ref 0.8–5.3)
LYMPHOCYTES NFR BLD AUTO: 27 %
MCH RBC QN AUTO: 36.9 PG (ref 26.5–33)
MCHC RBC AUTO-ENTMCNC: 33.7 G/DL (ref 31.5–36.5)
MCV RBC AUTO: 110 FL (ref 78–100)
MONOCYTES # BLD AUTO: 0.6 10E3/UL (ref 0–1.3)
MONOCYTES NFR BLD AUTO: 14 %
NEUTROPHILS # BLD AUTO: 2.4 10E3/UL (ref 1.6–8.3)
NEUTROPHILS NFR BLD AUTO: 53 %
NRBC # BLD AUTO: 0 10E3/UL
NRBC BLD AUTO-RTO: 0 /100
PLATELET # BLD AUTO: 306 10E3/UL (ref 150–450)
POTASSIUM BLD-SCNC: 4.3 MMOL/L (ref 3.4–5.3)
RBC # BLD AUTO: 3.58 10E6/UL (ref 3.8–5.2)
SODIUM SERPL-SCNC: 136 MMOL/L (ref 133–144)
TROPONIN I SERPL HS-MCNC: 9 NG/L
WBC # BLD AUTO: 4.6 10E3/UL (ref 4–11)

## 2022-02-21 PROCEDURE — 36415 COLL VENOUS BLD VENIPUNCTURE: CPT | Performed by: EMERGENCY MEDICINE

## 2022-02-21 PROCEDURE — 99284 EMERGENCY DEPT VISIT MOD MDM: CPT

## 2022-02-21 PROCEDURE — 93005 ELECTROCARDIOGRAM TRACING: CPT

## 2022-02-21 PROCEDURE — 80048 BASIC METABOLIC PNL TOTAL CA: CPT | Performed by: EMERGENCY MEDICINE

## 2022-02-21 PROCEDURE — 76705 ECHO EXAM OF ABDOMEN: CPT

## 2022-02-21 PROCEDURE — 85025 COMPLETE CBC W/AUTO DIFF WBC: CPT | Performed by: EMERGENCY MEDICINE

## 2022-02-21 PROCEDURE — 84484 ASSAY OF TROPONIN QUANT: CPT | Performed by: EMERGENCY MEDICINE

## 2022-02-21 ASSESSMENT — ENCOUNTER SYMPTOMS
DIZZINESS: 0
LIGHT-HEADEDNESS: 0
SHORTNESS OF BREATH: 0
CHEST TIGHTNESS: 0

## 2022-02-22 LAB
ATRIAL RATE - MUSE: 50 BPM
DIASTOLIC BLOOD PRESSURE - MUSE: NORMAL MMHG
INTERPRETATION ECG - MUSE: NORMAL
P AXIS - MUSE: 51 DEGREES
PR INTERVAL - MUSE: 144 MS
QRS DURATION - MUSE: 80 MS
QT - MUSE: 460 MS
QTC - MUSE: 419 MS
R AXIS - MUSE: 23 DEGREES
SYSTOLIC BLOOD PRESSURE - MUSE: NORMAL MMHG
T AXIS - MUSE: 59 DEGREES
VENTRICULAR RATE- MUSE: 50 BPM

## 2022-02-22 NOTE — DISCHARGE INSTRUCTIONS
Please keep the propanolol at your same dose as it is improving your tremor.    For improved blood pressure control, I am going to increase your lisinopril from 20 mg a day to 40 mg once a day.      Please follow-up with your regular doctor in 1 week for recheck of your blood pressure.    Discharge Instructions  Hypertension - High Blood Pressure    During you visit to the Emergency Department, your blood pressure was higher than the recommended blood pressure.  This may be related to stress, pain, medication or other temporary conditions. In these cases, your blood pressure may return to normal on its own. If you have a history of high blood pressure, you may need to have your provider adjust your medications. Sometimes, your high measurement here may indicate that you have developed high blood pressure that will stay high unless it is treated. As a general rule, high blood pressure causes problems over years rather than days, weeks, or months. So, while it is important to treat blood pressure, it is rarely important to treat blood pressure immediately. Occasionally we will begin a medication in the Emergency Department; more often we will recommend close follow-up for medications with a primary doctor/clinic.    Generally, every Emergency Department visit should have a follow-up clinic visit with either a primary or a specialty clinic/provider. Please follow-up as instructed by your emergency provider today.    Return to the Emergency Department if you start to have:  A severe headache.  Chest pain.  Shortness of breath.  Weakness or numbness that affects one part of the body.  Confusion.  Vision changes.  Significant swelling of legs and/or eyes.  A reaction to any medication started in the Emergency Department.    What can I do to help myself?  Avoid alcohol.  Take any blood pressure medicine that you are prescribed.  Get a good night s sleep.  Lower your salt intake.  Exercise.  Lose weight.  Manage  stress.  See your doctor regularly    If blood pressure medication was started in the Emergency Department:  The medicine may not have an immediate effect. The body and brain determine what blood pressure you have. The medicine s job is to retrain the body s  thermostat  to a lower blood pressure.  You will need to follow up with your provider to see how this medicine is working for you.  If you were given a prescription for medicine here today, be sure to read all of the information (including the package insert) that comes with your prescription.  This will include important information about the medicine, its side effects, and any warnings that you need to know about.  The pharmacist who fills the prescription can provide more information and answer questions you may have about the medicine.  If you have questions or concerns that the pharmacist cannot address, please call or return to the Emergency Department.   Remember that you can always come back to the Emergency Department if you are not able to see your regular provider in the amount of time listed above, if you get any new symptoms, or if there is anything that worries you.

## 2022-02-22 NOTE — ED PROVIDER NOTES
History   Chief Complaint:  High Blood Pressure      HPI   Mily Hilliard is a 71 year old female with history of essential tremor and hypertension who presents with high blood pressure. The patient reported she went to her primary on 2/9 for her annual physical and told her primary that her essential tremor had gotten worse over time. Her primary decided to double her dose of propranolol from 60mg to 120mg and her lisinopril from 10mg to 20mg. She was told to check her blood pressure regularly after these changes and initially it was reassuring. However over the last several days her blood pressure has been increasing, the highest was today at 178 systolic, while her heart rate has been in the high 40s. Despite this, the patient feels fine and denied any lightheadedness, chest pain, or shortness of breath and she has noted an improvement in her tremor.     Review of Systems   Respiratory: Negative for chest tightness and shortness of breath.    Cardiovascular: Negative for chest pain.   Neurological: Negative for dizziness and light-headedness.   All other systems reviewed and are negative.    Allergies:   The patient has no known allergies.     Medications:  Lipitor   Phoslo  Levothyroxine   Lisinopril -20mg increased from 10 mg  Prilosec   Propranolol -120 mg, increased from 60 mg on 2/9    Past Medical History:     Arthritis   Breast cancer   Hypercholesteremia   hypertension   Hypothyroidism   Essential tremor     Past Surgical History:    Bilateral foot surgery   Cataract removal   Right breat lumpectomy     Family History:    Breast cancer    Macular degeneration    Social History:   Presents to the ED: with her    Marital Status:        Physical Exam     Patient Vitals for the past 24 hrs:   BP Temp Temp src Pulse Resp SpO2   02/21/22 2100 (!) 155/66 -- -- (!) 49 18 100 %   02/21/22 1944 (!) 181/72 97.7  F (36.5  C) Temporal 52 20 100 %       Physical Exam      Eyes:    Conjunctiva  normal  Neck:    Supple, no meningismus.     CV:     Bradycardic, regular rhythm.      No murmurs, rubs or gallops.       No unilateral leg swelling.       2+ radial pulses bilateral.       No lower extremity edema.  PULM:    Clear to auscultation bilateral.       No respiratory distress.      Good air exchange.     No rales or wheezing.     No stridor.  ABD:    Soft, non-tender, non-distended.       No pulsatile masses.       No rebound, guarding or rigidity.  MSK:     No gross deformity to all four extremities.   LYMPH:   No cervical lymphadenopathy.  NEURO:   Alert. Good muscle tone, no atrophy.  Skin:    Warm, dry and intact.    Psych:    Mood is good and affect is appropriate.        Emergency Department Course   ECG  ECG obtained at 1959, ECG read at 2153  Sinus bradycardia. Possible left atrial enlargement. Borderline ECG.  No significant changes as compared to prior, dated 1/9/2020.  Rate 50 bpm. HI interval 144 ms. QRS duration 80 ms. QT/QTc 460/419 ms. P-R-T axes 51 23 59.     Laboratory:  Labs Ordered and Resulted from Time of ED Arrival to Time of ED Departure   BASIC METABOLIC PANEL - Abnormal       Result Value    Sodium 136      Potassium 4.3      Chloride 107      Carbon Dioxide (CO2) 24      Anion Gap 5      Urea Nitrogen 18      Creatinine 0.69      Calcium 9.4      Glucose 123 (*)     GFR Estimate >90     CBC WITH PLATELETS AND DIFFERENTIAL - Abnormal    WBC Count 4.6      RBC Count 3.58 (*)     Hemoglobin 13.2      Hematocrit 39.2       (*)     MCH 36.9 (*)     MCHC 33.7      RDW 12.1      Platelet Count 306      % Neutrophils 53      % Lymphocytes 27      % Monocytes 14      % Eosinophils 4      % Basophils 2      % Immature Granulocytes 0      NRBCs per 100 WBC 0      Absolute Neutrophils 2.4      Absolute Lymphocytes 1.2      Absolute Monocytes 0.6      Absolute Eosinophils 0.2      Absolute Basophils 0.1      Absolute Immature Granulocytes 0.0      Absolute NRBCs 0.0     TROPONIN I -  Normal    Troponin I High Sensitivity 9          Emergency Department Course:  Reviewed:  I reviewed nursing notes, vitals, past medical history and Care Everywhere    Assessments:   I obtained history and examined the patient as noted above. I reviewed at home blood pressure management techniques with the patient and her . After discussion of outpatient vs inpatient treatment options, the patient and her  were comfortable with discharge to home with a increased dose of lisinopril and close follow up with her primary care doctor.     Disposition:  The patient was discharged to home.     Impression & Plan     Medical Decision Makin-year-old female on propanolol for essential tremor and hypertension as well as lisinopril presents for elevated blood pressure and low-grade bradycardia.  In regards to her bradycardia, she is in sinus bradycardia and remains asymptomatic.  She has a history of heart rate in the 50's which has not significantly declined despite the increased dose of propranolol.  She has improved control of her tremor with higher dose thus reasonable to maintain her current dose of propranolol.    In regards to her hypertension, she has elevated pressures despite the recent increase in lisinopril to 20 mg.  She has no associated endorgan damage.  We will increase trinh lisinopril from 20 to 40 mg once daily.  Close follow-up with primary care physician for further management of hypertension.    Diagnosis:    ICD-10-CM    1. Sinus bradycardia  R00.1    2. Essential hypertension  I10        Scribe Disclosure:  I, Jeny Queen, am serving as a scribe at 9:38 PM on 2022 to document services personally performed by Jason Watkins MD based on my observations and the provider's statements to me.             Jason Watkins MD  [phone]

## 2022-02-22 NOTE — TELEPHONE ENCOUNTER
Triage Call: Stan and patient calling, verbal consent to speak with Stan given. HTN concerns BP: 178/71. Physical on the 9th, Increased propanolol due to tremors and increased lisinopril and took off hydrochlorothiazide. Monitoring BP and pulse. Every day her BP has been going up. More tired then normal. No changes in gait. BP recheck: 179/84 P:47 Consistently been 46-50 for a week.     According to the protocol, patient should go to the ED now. Care advice given. Patient and spouse verbalizes understanding and agrees with plan of care.       COVID 19 Nurse Triage Plan/Patient Instructions    Please be aware that novel coronavirus (COVID-19) may be circulating in the community. If you develop symptoms such as fever, cough, or SOB or if you have concerns about the presence of another infection including coronavirus (COVID-19), please contact your health care provider or visit [email or web address].     Disposition/Instructions    ED Visit recommended. Follow protocol based instructions.     Bring Your Own Device:  Please also bring your smart device(s) (smart phones, tablets, laptops) and their charging cables for your personal use and to communicate with your care team during your visit.    Thank you for taking steps to prevent the spread of this virus.  o Limit your contact with others.  o Wear a simple mask to cover your cough.  o Wash your hands well and often.    Resources    [facility]: About COVID-19: www.[employer])view.org/covid19/    CDC: What to Do If You're Sick: www.cdc.gov/coronavirus/2019-ncov/about/steps-when-sick.html    CDC: Ending Home Isolation: www.cdc.gov/coronavirus/2019-ncov/hcp/disposition-in-home-patients.html     CDC: Caring for Someone: www.cdc.gov/coronavirus/2019-ncov/if-you-are-sick/care-for-someone.html     [facility]: Interim Guidance for Hospital Discharge to Home: www.health.[facility].[place].us/diseases/coronavirus/hcp/hospdischarge.pdf    [facility]  trials (COVID-19 research studies): clinicalaffairs.[facility]-clinical-trials     Below are the COVID-19 hotlines at the [place] Department of Health ([facility] Interpreters are available.   o For health questions: Call 403-605-6295 or 1-513.747.5212 (7 a.m. to 7 p.m.)  o For questions about schools and childcare: Call 958-445-8504 or 1-333.938.3063 (7 a.m. to 7 p.m.)     Kia Guerra RN Nursing Advisor 2/21/2022 6:52 PM     Reason for Disposition    Heart beating very slowly (e.g., < 50 / minute)  (Exception: athlete)    Additional Information    Negative: Difficult to awaken or acting confused (e.g., disoriented, slurred speech)    Negative: Severe difficulty breathing (e.g., struggling for each breath, speaks in single words)    Negative: [1] Weakness of the face, arm or leg on one side of the body AND [2] new onset    Negative: [1] Numbness (i.e., loss of sensation) of the face, arm or leg on one side of the body AND [2] new onset    Negative: [1] Chest pain lasts > 5 minutes AND [2] history of heart disease  (i.e., heart attack, bypass surgery, angina, angioplasty, CHF)    Negative: [1] Chest pain AND [2] took nitrogylcerin AND [3] pain was not relieved    Negative: Sounds like a life-threatening emergency to the triager    Negative: Symptom is main concern  (e.g., headache, chest pain)    Negative: Low blood pressure is main concern    Negative: [1] Systolic BP  >= 160 OR Diastolic >= 100 AND [2] cardiac or neurologic symptoms (e.g., chest pain, difficulty breathing, unsteady gait, blurred vision)    Negative: [1] Pregnant > 20 weeks (or postpartum < 6 weeks) AND [2] new hand or face swelling    Negative: [1] Pregnant > 20 weeks AND [2] BP Systolic BP  >= 140 OR Diastolic >= 90    Negative: [1] Systolic BP  >= 200 OR Diastolic >= 120  AND [2] having NO cardiac or neurologic symptoms    Negative: [1] Postpartum < 6 weeks AND [2] BP Systolic BP  >= 140 OR Diastolic >= 90    Negative: [1] Systolic BP  >= 180 OR  Diastolic >= 110 AND [2] missed most recent dose of blood pressure medication    Negative: Passed out (i.e., lost consciousness, collapsed and was not responding)    Negative: Shock suspected (e.g., cold/pale/clammy skin, too weak to stand, low BP, rapid pulse)    Negative: Difficult to awaken or acting confused (e.g., disoriented, slurred speech)    Negative: Systolic BP  >= 180 OR Diastolic >= 110    Negative: Ran out of BP medications    Negative: Visible sweat on face or sweat dripping down face    Negative: Unable to walk, or can only walk with assistance (e.g., requires support)    Negative: [1] Received SHOCK from implantable cardiac defibrillator AND [2] persisting symptoms (i.e., palpitations, lightheadedness)    Negative: [1] Dizziness, lightheadedness, or weakness AND [2] heart beating very rapidly (e.g., > 140 / minute)    Negative: [1] Dizziness, lightheadedness, or weakness AND [2] heart beating very slowly  (e.g., < 50 / minute)    Negative: Sounds like a life-threatening emergency to the triager    Negative: Chest pain    Negative: Difficulty breathing    Negative: Dizziness, lightheadedness, or weakness    Negative: [1] Heart beating very rapidly (e.g., > 140 / minute) AND [2] present now  (Exception: during exercise)    Protocols used: HEART RATE AND HEARTBEAT OFYPSSOYR-I-EM, HIGH BLOOD PRESSURE-A-AH

## 2022-02-22 NOTE — ED TRIAGE NOTES
"Pt comes to ED with  after believing she has been \"doubling her propanolol dose\" on accident. Last dose at 1730. Pt has hx tremors.   "

## 2022-02-23 ENCOUNTER — TELEPHONE (OUTPATIENT)
Dept: PEDIATRICS | Facility: CLINIC | Age: 72
End: 2022-02-23
Payer: COMMERCIAL

## 2022-02-23 DIAGNOSIS — I10 ESSENTIAL HYPERTENSION: Primary | ICD-10-CM

## 2022-02-23 DIAGNOSIS — G25.0 ESSENTIAL TREMOR: ICD-10-CM

## 2022-02-23 RX ORDER — PRIMIDONE 50 MG/1
25 TABLET ORAL AT BEDTIME
Qty: 60 TABLET | Refills: 1 | Status: SHIPPED | OUTPATIENT
Start: 2022-02-23 | End: 2022-02-28

## 2022-02-23 RX ORDER — PROPRANOLOL HCL 60 MG
60 CAPSULE, EXTENDED RELEASE 24HR ORAL AT BEDTIME
Qty: 30 CAPSULE | Refills: 0 | COMMUNITY
Start: 2022-02-23 | End: 2022-03-02

## 2022-02-23 NOTE — TELEPHONE ENCOUNTER
Pt requesting hospital f/u with  next week. Scheduled an OV with  on 2/28.     Her BP this morning was 127/66 but her HR always in high 40s & low 50s. She is asymptomatic.     Currently taking propranolol 120 mg(increased from 60 mg to 120 mg about 2 weeks ago) & lisinopril 40 mg(increased from 20 mg to 40 mg while discharge).     After huddling with , advised pt that her propranolol can be taper down & switch to primidone taper up dose. Continue lisinopril 40 mg. Pt agrees to this plan.      - please advise on primidone taper up dose instruction. Thanks.     James RN  Patient Advocate Liason (PAL)  [facility]

## 2022-02-23 NOTE — TELEPHONE ENCOUNTER
Let's have her decrease propranolol to 60mg daily until follow-up with me.    Start primidone 25mg (1/2 of tab) daily while on lower dose of propranolol; we will likely increase dosing when she sees me next week.    Prescription sent in to [facility].    Meri Pacheco MD  Internal Medicine-Pediatrics

## 2022-02-23 NOTE — TELEPHONE ENCOUNTER
Called pt back & went over the recommendation by . Pt agrees to the plan. She has old propranolol ER 60 mg capsules on hand. So, will start the taper down from tonight. No other questions or concerns from pt.     Updated med list.    James RN  Patient Advocate Liason (PAL)  [facility]

## 2022-02-28 ENCOUNTER — OFFICE VISIT (OUTPATIENT)
Dept: PEDIATRICS | Facility: CLINIC | Age: 72
End: 2022-02-28
Payer: COMMERCIAL

## 2022-02-28 ENCOUNTER — TELEPHONE (OUTPATIENT)
Dept: PEDIATRICS | Facility: CLINIC | Age: 72
End: 2022-02-28

## 2022-02-28 VITALS
DIASTOLIC BLOOD PRESSURE: 50 MMHG | BODY MASS INDEX: 22.03 KG/M2 | TEMPERATURE: 98.9 F | HEART RATE: 54 BPM | OXYGEN SATURATION: 98 % | SYSTOLIC BLOOD PRESSURE: 131 MMHG | WEIGHT: 138.4 LBS

## 2022-02-28 DIAGNOSIS — G25.0 ESSENTIAL TREMOR: ICD-10-CM

## 2022-02-28 DIAGNOSIS — I10 PRIMARY HYPERTENSION: ICD-10-CM

## 2022-02-28 PROCEDURE — 99214 OFFICE O/P EST MOD 30 MIN: CPT | Performed by: INTERNAL MEDICINE

## 2022-02-28 RX ORDER — PRIMIDONE 50 MG/1
50 TABLET ORAL AT BEDTIME
Qty: 60 TABLET | Refills: 1
Start: 2022-02-28 | End: 2022-03-28 | Stop reason: DRUGHIGH

## 2022-02-28 RX ORDER — LISINOPRIL 40 MG/1
40 TABLET ORAL DAILY
Qty: 90 TABLET | Refills: 3 | Status: SHIPPED | OUTPATIENT
Start: 2022-02-28 | End: 2023-02-15

## 2022-02-28 NOTE — PATIENT INSTRUCTIONS
Blood pressure looks good! Keep taking lisinopril 40mg daily. New prescription sent for higher dose.    For tremor, increase primidone to 50mg (full tab) daily at night. Keep taking 60mg or propranolol for now. If tremors improve, we can stop propranolol and taper up on primidone as tolerated. Give us an update in 1 week!

## 2022-02-28 NOTE — TELEPHONE ENCOUNTER
Need to f/u in one week.     Notes from 2/28/22:  Blood pressure looks good! Keep taking lisinopril 40mg daily. New prescription sent for higher dose.     For tremor, increase primidone to 50mg (full tab) daily at night. Keep taking 60mg or propranolol for now. If tremors improve, we can stop propranolol and taper up on primidone as tolerated. Give us an update in 1 week!    Can taper up by 25mg every 7 days or so; no higher than 250mg. Can stop propranolol if tremors are better controlled in future, however may need low dose of this as well.     James, RN  Patient Advocate Liason (PAL)  [facility]

## 2022-02-28 NOTE — PROGRESS NOTES
Assessment & Plan     Essential tremor  Slightly worse since decreasing propranolol. Goal would be to taper up on primidone so that we can stop propranolol given bradycardia. Discussed that this may be limited by sedation. Will increase to 50mg at bedtime today, she will reach out in a week or so. Can taper up by 25mg every 7 days or so; no higher than 250mg. Can stop propranolol if tremors are better controlled in future, however may need low dose of this as well.   - primidone (MYSOLINE) 50 MG tablet; Take 1 tablet (50 mg) by mouth At Bedtime    Primary hypertension  Markedly improved with increased lisinopril dose. She has been monitoring BPs at home - these are 120-130s systolic. Will plan to keep lisinopril dosing the same, may have slight increases if she tapers off propranolol in future so will monitor.   - lisinopril (ZESTRIL) 40 MG tablet; Take 1 tablet (40 mg) by mouth daily         Patient Instructions   Blood pressure looks good! Keep taking lisinopril 40mg daily. New prescription sent for higher dose.    For tremor, increase primidone to 50mg (full tab) daily at night. Keep taking 60mg or propranolol for now. If tremors improve, we can stop propranolol and taper up on primidone as tolerated. Give us an update in 1 week!      No follow-ups on file.    Meri Collado MD  [facility] LEON Minor is a 71 year old who presents for the following health issues     HPI     ED/UC Followup:    Facility:  [facility]  Date of visit: 2/21/22  Reason for visit: HTN, Bradycardia  Current Status: feels tired but okay       Tremors are still present, slightly worse since decreasing propranolol.     BP has gotten much better since decreasing propranolol and going up on the lisinopril. No chest pain or shortness of breath.     Otherwise feels like she is doing well.     No longer drinking wine. Notes that she had been drinking wine, especially when she was out with friends to suppress her  tremor. Feels much better since she is no longer doing this.     Review of Systems   Constitutional, HEENT, cardiovascular, pulmonary, gi and gu systems are negative, except as otherwise noted.      Objective    /50   Pulse 54   Temp 98.9  F (37.2  C) (Tympanic)   Wt 62.8 kg (138 lb 6.4 oz)   SpO2 98%   BMI 22.03 kg/m    Body mass index is 22.03 kg/m .  Physical Exam   GENERAL: healthy, alert and no distress  NEURO: Normal strength and tone, mentation intact and speech normal

## 2022-03-01 DIAGNOSIS — G25.0 ESSENTIAL TREMOR: ICD-10-CM

## 2022-03-01 DIAGNOSIS — I10 ESSENTIAL HYPERTENSION: ICD-10-CM

## 2022-03-02 RX ORDER — PROPRANOLOL HCL 60 MG
CAPSULE, EXTENDED RELEASE 24HR ORAL
Qty: 90 CAPSULE | Refills: 0 | Status: SHIPPED | OUTPATIENT
Start: 2022-03-02 | End: 2022-03-28 | Stop reason: ALTCHOICE

## 2022-03-02 NOTE — TELEPHONE ENCOUNTER
From OV on 2/28/2022:    For tremor, increase primidone to 50mg (full tab) daily at night. Keep taking 60mg or propranolol for now. If tremors improve, we can stop propranolol and taper up on primidone as tolerated. Give us an update in 1 week!    Prescription approved per [facility] Refill Protocol.  Maci Wiggins RN

## 2022-03-07 NOTE — TELEPHONE ENCOUNTER
Let's go up by 50mg every 14 days if patient is ok with that? I think that side effects of sedation tend to be most limiting.     Let's keep a close eye on BP. If heart rate continues to be that low, would probably need to stop propranolol and potentially add in additional agent.    Meri Pacheco MD  Internal Medicine-Pediatrics

## 2022-03-07 NOTE — TELEPHONE ENCOUNTER
Called pt back & advised as below. Pt agrees to the plan. Will f/u with pt in a week.    James RN  Patient Advocate Liason (PAL)  [facility]

## 2022-03-07 NOTE — TELEPHONE ENCOUNTER
Called pt to f/u on primidone. Pt is tolerating primidone 50 mg at night. Continues propranolol 50 mg daily as well. No SE such as worsening tremors or dizziness.     Her BP runs between 137-150/65-68, pulse 49-52. Agrees to increase primidone as per 's recommendation.     Primidone is a small tablet & she is not sure whether she will be able to cut the pills in half to taper up dose by 25 mg weekly.      - is it ok to taper up primidone by 50 mg every 7 days? Please advise.     James RN  Patient Advocate Liason (PAL)  [facility]

## 2022-03-13 DIAGNOSIS — I10 ESSENTIAL HYPERTENSION: ICD-10-CM

## 2022-03-14 NOTE — TELEPHONE ENCOUNTER
Pt calling with an update. She is currently on primidone 100 mg at night & propranolol 60 mg once a day. Tolerating primidone increased dose, denies any SE. BP runs 148-150/70-72, pulse 50-52. Tremor has been under control.     Has mild URI sx's(congestion & cough) for a week. Has been taking otc dayquil 3-4 times a day. Sx's are much better, still has dry cough. Advised to discontinue dayquil & try otc coricidin cough med prn only if needed. Also advised to push fluids. Pt agrees to the plan.      Advised her to continue primidone 100 mg with propranolol 60 mg for another week. Pt will call us next week with an update.    Plan is to increase her primidone to 150 mg at bedtime with same propranolol 60 mg after next week. Once the tremor is well controlled we can try stopping propranolol 60 mg. Plan is to manage her tremor with just primidone.     James RN  Patient Advocate Liason (PAL)  [facility]

## 2022-03-15 RX ORDER — LISINOPRIL/HYDROCHLOROTHIAZIDE 10-12.5 MG
TABLET ORAL
Qty: 90 TABLET | Refills: 1 | OUTPATIENT
Start: 2022-03-15

## 2022-03-21 ENCOUNTER — TELEPHONE (OUTPATIENT)
Dept: PEDIATRICS | Facility: CLINIC | Age: 72
End: 2022-03-21
Payer: COMMERCIAL

## 2022-03-21 NOTE — TELEPHONE ENCOUNTER
"Called and updated pt.    Pt agreed to wait for PCP to review. Can postpone message in provider in-basket until Wednesday/Thursday.      - Robert \"Gage\" Raciel (he/him/his), RN - Patient Advocate Liason (PAL)  [facility]    "

## 2022-03-21 NOTE — TELEPHONE ENCOUNTER
"See telephone encounter on 03/21/22 for continuity.      - Robert \"Gage\" Raciel (he/him/his), RN - Patient Advocate Liason (PAL)  [facility]    "

## 2022-03-21 NOTE — TELEPHONE ENCOUNTER
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    Do not recommend medication changes at this time. PCP (primary care provider) can follow up when back in office.    Maureen Martines MD  Internal Medicine & Pediatrics  [facility]

## 2022-03-21 NOTE — TELEPHONE ENCOUNTER
1:33 3/21/22 SB3 Voicemail    Pt called stating her blood pressure has ranged 150-153/70-74, heartrate has ranged 50-53 this past week. Patient also states she is doing well on her essential tremor med.    Aminta Combs on 3/21/2022 at 2:49 PM

## 2022-03-21 NOTE — TELEPHONE ENCOUNTER
"Per recent OV on 02/28/22, pt was to maintain lisinopril 40 mg and propranolol 60 mg.    Pt taking primidone 50 mg daily, then increased to 100 mg. Pt has not increased dosing yet, waiting for provider to confirm if she should increase to 150 mg or further.    Pt advised if HR low, to discontinue propranolol, and PCP would add alt med. Pt checking BP only a few times a week now.    Pt denies symptoms, such as lightheadedness, dizziness, HA, confusion, etc.    Dr Pacheco: Pt report improvement of tremors, but not fully resolved. Do you want to make any adjustment to medication? Increase primidone or maintain?      - Robert \"Gage\" Raciel (he/him/his), RN - Patient Advocate Liason (PAL)  [facility]    "

## 2022-03-23 ENCOUNTER — LAB (OUTPATIENT)
Dept: LAB | Facility: CLINIC | Age: 72
End: 2022-03-23
Payer: COMMERCIAL

## 2022-03-23 ENCOUNTER — MYC MEDICAL ADVICE (OUTPATIENT)
Dept: PEDIATRICS | Facility: CLINIC | Age: 72
End: 2022-03-23

## 2022-03-23 DIAGNOSIS — I10 ESSENTIAL HYPERTENSION: Primary | ICD-10-CM

## 2022-03-23 DIAGNOSIS — R30.0 DYSURIA: ICD-10-CM

## 2022-03-23 DIAGNOSIS — G25.0 ESSENTIAL TREMOR: ICD-10-CM

## 2022-03-23 DIAGNOSIS — E06.3 HYPOTHYROIDISM DUE TO HASHIMOTO'S THYROIDITIS: ICD-10-CM

## 2022-03-23 LAB
T4 FREE SERPL-MCNC: 1.85 NG/DL (ref 0.76–1.46)
TSH SERPL DL<=0.005 MIU/L-ACNC: 0.14 MU/L (ref 0.4–4)

## 2022-03-23 PROCEDURE — 36415 COLL VENOUS BLD VENIPUNCTURE: CPT

## 2022-03-23 PROCEDURE — 84443 ASSAY THYROID STIM HORMONE: CPT

## 2022-03-23 PROCEDURE — 84439 ASSAY OF FREE THYROXINE: CPT

## 2022-03-23 RX ORDER — AMLODIPINE BESYLATE 5 MG/1
5 TABLET ORAL DAILY
Qty: 90 TABLET | Refills: 0 | Status: SHIPPED | OUTPATIENT
Start: 2022-03-23 | End: 2022-06-14

## 2022-03-23 RX ORDER — PRIMIDONE 50 MG/1
150 TABLET ORAL AT BEDTIME
Qty: 270 TABLET | Refills: 0 | Status: SHIPPED | OUTPATIENT
Start: 2022-03-23 | End: 2022-04-06

## 2022-03-23 NOTE — TELEPHONE ENCOUNTER
Let's have patient increase primidone to 150mg daily (can send new prescription if needed) and stop propranolol if she is willing to do so.    I would be interested in having her start amlodipine for better BP control if she is willing to try this - would recommend starting at 5mg daily and continuing to follow her BPs. Can cause some leg swelling. Fine to send in to pharmacy of her choice if she is ok with this.    Would follow-up in 1 week to see how tremor, BP and HR are doing at that time.    Meri Pacheco MD  Internal Medicine-Pediatrics

## 2022-03-23 NOTE — TELEPHONE ENCOUNTER
Routing to PCP: Patient agreeable to plan: Primidone 150 mg daily. Starting Amlodipine 5 mg tablet and stopping propranolol.    Pended prescription and pharmacy. Please review and sign.  Thanks!  Heide CERVANTES RN, BSN

## 2022-03-28 ENCOUNTER — TELEPHONE (OUTPATIENT)
Dept: PEDIATRICS | Facility: CLINIC | Age: 72
End: 2022-03-28
Payer: COMMERCIAL

## 2022-03-28 DIAGNOSIS — E06.3 HYPOTHYROIDISM DUE TO HASHIMOTO'S THYROIDITIS: Primary | ICD-10-CM

## 2022-03-28 RX ORDER — LEVOTHYROXINE SODIUM 88 UG/1
88 TABLET ORAL DAILY
Qty: 90 TABLET | Refills: 0 | Status: SHIPPED | OUTPATIENT
Start: 2022-03-28 | End: 2022-07-14

## 2022-03-28 NOTE — TELEPHONE ENCOUNTER
Pt notifies that she stopped propranolol, is taking primidone 150 mg daily & amlodipine 5 mg. Is tolerating the current dose. Her BP is 127/78, pulse 61.    She will continue to monitor BP.    YANCI Ramirez  Patient Advocate Liason (PAL)  [facility]

## 2022-03-28 NOTE — TELEPHONE ENCOUNTER
Called pt at [phone] & went over the recommendation. She agrees to the plan. Sent new rx for levothyroxine 88 mcg, discontinued levothyroxine 100 mcg from the med list & scheduled lab-only appointment on 5/11 to recheck TSH.    Notes from :  Please call patient. TSH still low and T4 elevated, would recommend decreasing levothyroxine further to 88mcg. If patient in agreement, fine to send refill to pharmacy of her choice. Would then plan to recheck levels in 4-6 weeks.     James RN  Patient Advocate Liason (PAL)  [facility]

## 2022-04-06 ENCOUNTER — TELEPHONE (OUTPATIENT)
Dept: PEDIATRICS | Facility: CLINIC | Age: 72
End: 2022-04-06
Payer: COMMERCIAL

## 2022-04-06 DIAGNOSIS — G25.0 ESSENTIAL TREMOR: ICD-10-CM

## 2022-04-06 RX ORDER — PRIMIDONE 50 MG/1
200 TABLET ORAL AT BEDTIME
Qty: 270 TABLET | Refills: 0 | COMMUNITY
Start: 2022-04-06 | End: 2022-05-25

## 2022-04-06 NOTE — TELEPHONE ENCOUNTER
Pt LM that her tremor is back now. Requesting call back at [phone].    Called pt back to get details. Pt is currently taking primidone 150 mg, tolerating well with no SE. Discontinued propranolol 60 mg about 10 days ago. Started noticing increase in tremor lately, her hands shake while eating. Her BP runs between 127-135/61-68, pulse around 58.     - please advise on whether you would like to increase primidone to 200 mg or restart propranolol ER 60 mg with her primidone 150 mg? Pt has a bottle of propranolol 60 mg on hand, so no need for new rx. Thanks.      History:  slowly tapered up her primidone from 50 mg to 150 mg. Then discontinued propranolol 60 mg on 3/23. Is currently taking lisinopril 40 mg & amlodipine 5 mg for HTN.     James RN  Patient Advocate Liason (PAL)  [facility]

## 2022-04-06 NOTE — TELEPHONE ENCOUNTER
Would recommend increasing primidone to 200mg daily if she is tolerating this well.    Meri Pacheco MD  Internal Medicine-Pediatrics

## 2022-04-06 NOTE — TELEPHONE ENCOUNTER
Called pt & advised to increase primidone to 200 mg from tomorrow. Updated med list.     Will f/u with pt in a week.    James RN  Patient Advocate Liason (PAL)  [facility]

## 2022-04-08 DIAGNOSIS — E06.3 HYPOTHYROIDISM DUE TO HASHIMOTO'S THYROIDITIS: ICD-10-CM

## 2022-04-08 RX ORDER — LEVOTHYROXINE SODIUM 112 UG/1
TABLET ORAL
Qty: 90 TABLET | Refills: 0
Start: 2022-04-08

## 2022-04-15 NOTE — TELEPHONE ENCOUNTER
Called pt to get an update. Pt says that with the increased primidone 200 mg, her tremor gotten little better. Still has mild to moderate tremor, but much better than the week before. Haven't checked her BP lately, but denies any concerning sx's.    Advised her to continue the current dose & monitor. Pt agrees to the plan. Will plan to f/u with her next week.     James RN  Patient Advocate Liason (PAL)  [facility]

## 2022-04-22 NOTE — TELEPHONE ENCOUNTER
Called pt at [phone] to get details,. Not available, so LM to call me back at [phone].     James RN  Patient Advocate Liason (PAL)  [facility]

## 2022-04-22 NOTE — TELEPHONE ENCOUNTER
Pt calling back. Says that her tremor is slowly getting better with the current dose of primidone 200 mg. No SE from med.    Her BP has been 123/62 & 138/62. But her pulse has been 50-55 w/o any concerning sx's. I see that her HR runs around 55 for the most part in the past.     Advised pt to continue primidone 200 mg for now & monitor closely. With any questions, advised her to reach us out. Pt agrees.     James RN  Patient Advocate Liason (PAL)  [facility]

## 2022-05-11 ENCOUNTER — LAB (OUTPATIENT)
Dept: LAB | Facility: CLINIC | Age: 72
End: 2022-05-11
Payer: COMMERCIAL

## 2022-05-11 DIAGNOSIS — R30.0 DYSURIA: ICD-10-CM

## 2022-05-11 DIAGNOSIS — E06.3 HYPOTHYROIDISM DUE TO HASHIMOTO'S THYROIDITIS: ICD-10-CM

## 2022-05-11 LAB
ALBUMIN UR-MCNC: NEGATIVE MG/DL
APPEARANCE UR: CLEAR
BILIRUB UR QL STRIP: NEGATIVE
COLOR UR AUTO: YELLOW
GLUCOSE UR STRIP-MCNC: NEGATIVE MG/DL
HGB UR QL STRIP: NEGATIVE
KETONES UR STRIP-MCNC: NEGATIVE MG/DL
LEUKOCYTE ESTERASE UR QL STRIP: NEGATIVE
NITRATE UR QL: NEGATIVE
PH UR STRIP: 6 [PH] (ref 5–7)
SP GR UR STRIP: 1.01 (ref 1–1.03)
T4 FREE SERPL-MCNC: 1.34 NG/DL (ref 0.76–1.46)
TSH SERPL DL<=0.005 MIU/L-ACNC: 0.03 MU/L (ref 0.4–4)
UROBILINOGEN UR STRIP-ACNC: 0.2 E.U./DL

## 2022-05-11 PROCEDURE — 84443 ASSAY THYROID STIM HORMONE: CPT

## 2022-05-11 PROCEDURE — 81003 URINALYSIS AUTO W/O SCOPE: CPT

## 2022-05-11 PROCEDURE — 36415 COLL VENOUS BLD VENIPUNCTURE: CPT

## 2022-05-11 PROCEDURE — 84439 ASSAY OF FREE THYROXINE: CPT

## 2022-05-13 ENCOUNTER — TELEPHONE (OUTPATIENT)
Dept: PEDIATRICS | Facility: CLINIC | Age: 72
End: 2022-05-13
Payer: COMMERCIAL

## 2022-05-13 DIAGNOSIS — E06.3 HYPOTHYROIDISM DUE TO HASHIMOTO'S THYROIDITIS: Primary | ICD-10-CM

## 2022-05-13 NOTE — TELEPHONE ENCOUNTER
See note below. Patient is agreeable to lowering dosage of levothyroxine. Pended prescription and pharmacy. Please route back to [facility] once approved to notify patient and assist with scheduling lab appointment.  Thanks!  Heide CERVANTES RN, BSN

## 2022-05-13 NOTE — TELEPHONE ENCOUNTER
"Reason for Call:  Other returning call    Detailed comments:  Patient returning phone call / TrackRt message from PCP regarding change to Thyroid medication, patient states \" that would be ok\" to change medication. Please call patient if any other information is needed.  Phone Number Patient can be reached at: Home number on file [phone] (home)    Best Time: No need to call back unless necesary    Can we leave a detailed message on this number? Not Applicable    Call taken on 5/13/2022 at 2:19 PM by Jacquie Schwab    "

## 2022-05-16 RX ORDER — LEVOTHYROXINE SODIUM 75 UG/1
75 TABLET ORAL DAILY
Qty: 90 TABLET | Refills: 0 | Status: SHIPPED | OUTPATIENT
Start: 2022-05-16 | End: 2022-08-18

## 2022-05-16 NOTE — TELEPHONE ENCOUNTER
Levothyroxine dose sent in, labs ordered. Please notify patient and assist with scheduling lab only appointment in 4-6 weeks. Thanks!    Meri Pacheco MD  Internal Medicine-Pediatrics

## 2022-06-13 DIAGNOSIS — I10 ESSENTIAL HYPERTENSION: ICD-10-CM

## 2022-06-14 RX ORDER — AMLODIPINE BESYLATE 5 MG/1
TABLET ORAL
Qty: 90 TABLET | Refills: 2 | Status: SHIPPED | OUTPATIENT
Start: 2022-06-14 | End: 2023-02-15

## 2022-06-14 NOTE — TELEPHONE ENCOUNTER
Prescription approved per [facility] Refill Protocol.    Cristal Herring RN on 6/14/2022 at 12:02 PM

## 2022-06-16 ENCOUNTER — LAB (OUTPATIENT)
Dept: LAB | Facility: CLINIC | Age: 72
End: 2022-06-16

## 2022-06-16 DIAGNOSIS — E06.3 HYPOTHYROIDISM DUE TO HASHIMOTO'S THYROIDITIS: ICD-10-CM

## 2022-06-16 DIAGNOSIS — E78.00 PURE HYPERCHOLESTEROLEMIA: ICD-10-CM

## 2022-06-16 PROCEDURE — 36415 COLL VENOUS BLD VENIPUNCTURE: CPT

## 2022-06-16 PROCEDURE — 84443 ASSAY THYROID STIM HORMONE: CPT

## 2022-06-16 PROCEDURE — 80061 LIPID PANEL: CPT

## 2022-06-16 PROCEDURE — 84460 ALANINE AMINO (ALT) (SGPT): CPT

## 2022-06-16 PROCEDURE — 84439 ASSAY OF FREE THYROXINE: CPT

## 2022-06-17 LAB
ALT SERPL W P-5'-P-CCNC: 24 U/L (ref 0–50)
CHOLEST SERPL-MCNC: 153 MG/DL
FASTING STATUS PATIENT QL REPORTED: ABNORMAL
HDLC SERPL-MCNC: 48 MG/DL
LDLC SERPL CALC-MCNC: 72 MG/DL
NONHDLC SERPL-MCNC: 105 MG/DL
T4 FREE SERPL-MCNC: 1.1 NG/DL (ref 0.76–1.46)
TRIGL SERPL-MCNC: 166 MG/DL
TSH SERPL DL<=0.005 MIU/L-ACNC: 0.21 MU/L (ref 0.4–4)

## 2022-06-20 ENCOUNTER — ANCILLARY PROCEDURE (OUTPATIENT)
Dept: BONE DENSITY | Facility: CLINIC | Age: 72
End: 2022-06-20
Attending: INTERNAL MEDICINE
Payer: COMMERCIAL

## 2022-06-20 DIAGNOSIS — Z78.0 ASYMPTOMATIC MENOPAUSAL STATE: ICD-10-CM

## 2022-06-20 PROCEDURE — 77085 DXA BONE DENSITY AXL VRT FX: CPT | Performed by: INTERNAL MEDICINE

## 2022-06-22 ENCOUNTER — ANCILLARY PROCEDURE (OUTPATIENT)
Dept: MAMMOGRAPHY | Facility: CLINIC | Age: 72
End: 2022-06-22
Attending: INTERNAL MEDICINE
Payer: COMMERCIAL

## 2022-06-22 DIAGNOSIS — Z12.31 ENCOUNTER FOR SCREENING MAMMOGRAM FOR BREAST CANCER: ICD-10-CM

## 2022-06-22 PROCEDURE — 77067 SCR MAMMO BI INCL CAD: CPT | Mod: TC | Performed by: RADIOLOGY

## 2022-06-27 ENCOUNTER — TELEPHONE (OUTPATIENT)
Dept: PEDIATRICS | Facility: CLINIC | Age: 72
End: 2022-06-27

## 2022-06-27 NOTE — TELEPHONE ENCOUNTER
Pt calling back. Pt would like to discuss about medication which can help to strengthen her bones. She currently has pain in her R lower back with pain radiating to her R knee. Suffering from this pain almost everyday. Seeing chiropractor every 6 weeks which has been helping. Does yoga twice a week & walking twice a week. Denies tingling/numbness in toes or weakness in lower extremity.     Scheduled an in-clinic visit with  on 7/7 to discuss about treatment options.     Dexa result by :  Here are the results from your recent bone density scan. Interestingly, your bone density looks relatively ok - it falls into the range of osteopenia (which is a precursor to osteoporosis, and doesn't typically require medication management - however you do benefit from vitamin D and calcium supplementation as well as weight bearing exercise). However, the reading doctor felt that you had evidence of a fracture of the L1 vertebra - this is in your lower back.      I'm not sure if you've recently had any issues with lower back pain, but if you do have a vertebral fracture, this is considered a fragility fracture, and would be consistent with a diagnosis of osteoporosis even if your bone density scores put you in the osteopenia range. Based on this, you may benefit from a medication to help strengthen your bones and reduce the risk for further fractures.      Let me know if this is something you would be interested in taking about more. We can always set up a quick virtual visit or do an Evisit if this is something you are interested in!    James, RN  Patient Advocate Liason (PAL)  [facility]

## 2022-07-14 ENCOUNTER — OFFICE VISIT (OUTPATIENT)
Dept: PEDIATRICS | Facility: CLINIC | Age: 72
End: 2022-07-14
Payer: COMMERCIAL

## 2022-07-14 VITALS
TEMPERATURE: 97 F | OXYGEN SATURATION: 100 % | HEART RATE: 62 BPM | DIASTOLIC BLOOD PRESSURE: 63 MMHG | WEIGHT: 128.7 LBS | BODY MASS INDEX: 20.49 KG/M2 | SYSTOLIC BLOOD PRESSURE: 137 MMHG | RESPIRATION RATE: 20 BRPM

## 2022-07-14 DIAGNOSIS — M80.08XA AGE-RELATED OSTEOPOROSIS WITH CURRENT PATHOLOGICAL FRACTURE, VERTEBRA(E), INITIAL ENCOUNTER FOR FRACTURE (H): ICD-10-CM

## 2022-07-14 DIAGNOSIS — S32.010A COMPRESSION FRACTURE OF L1 VERTEBRA, INITIAL ENCOUNTER (H): Primary | ICD-10-CM

## 2022-07-14 PROCEDURE — 36415 COLL VENOUS BLD VENIPUNCTURE: CPT | Performed by: STUDENT IN AN ORGANIZED HEALTH CARE EDUCATION/TRAINING PROGRAM

## 2022-07-14 PROCEDURE — 82306 VITAMIN D 25 HYDROXY: CPT | Performed by: STUDENT IN AN ORGANIZED HEALTH CARE EDUCATION/TRAINING PROGRAM

## 2022-07-14 PROCEDURE — 99213 OFFICE O/P EST LOW 20 MIN: CPT | Mod: GE | Performed by: STUDENT IN AN ORGANIZED HEALTH CARE EDUCATION/TRAINING PROGRAM

## 2022-07-14 RX ORDER — ALENDRONATE SODIUM 70 MG/1
70 TABLET ORAL
Qty: 12 TABLET | Refills: 4 | Status: SHIPPED | OUTPATIENT
Start: 2022-07-14 | End: 2023-08-27

## 2022-07-14 ASSESSMENT — PAIN SCALES - GENERAL: PAINLEVEL: WORST PAIN (10)

## 2022-07-14 NOTE — PATIENT INSTRUCTIONS
-We'll be in touch with your Vitamin D level results.  -You can  the alendronate prescription now, but don't start taking it until after we have the vitamin D level.  -Go ahead and increase your calcium supplement to 1200 mg daily  -Continue your vitamin D supplement at 1000 international unit(s) per day.        CALCIUM    Recommendations:  Teenagers and premenopausal women: 1200 mg/day  Pregnant and Lactating women: 1500 mg/day  Men and Postmenopausal women on estrogen: 1200mg/day  Postmenopausal women not on estrogen: 1500 mg/day    If you are not eating dairy products you also need 800 IU of vitamin D per day which can be obtained in either a multivitamin or in some of the Calcium tablets.    Dietary sources: These also contain vitamin D  Milk                            8 oz            300 mg  Yogurt                          1 cup           400 mg  Hard cheese                     1.5 oz          300 mg  Cottage cheese                  2 cup           300 mg  Orange juice with Calcium       8 oz            300 mg  Low fat dairy sources are recommended    Supplements:  Tums EX                         300 mg  Tums Ultra                      400 mg  Caltrate 600                    600 mg  Oscal                           500 mg  Oscal/D                         500 mg plus vitamin D  Women's Formula Multivitamin    450 mg

## 2022-07-18 LAB — DEPRECATED CALCIDIOL+CALCIFEROL SERPL-MC: 40 UG/L (ref 20–75)

## 2022-08-15 DIAGNOSIS — E06.3 HYPOTHYROIDISM DUE TO HASHIMOTO'S THYROIDITIS: ICD-10-CM

## 2022-08-18 RX ORDER — LEVOTHYROXINE SODIUM 75 UG/1
TABLET ORAL
Qty: 90 TABLET | Refills: 0 | Status: SHIPPED | OUTPATIENT
Start: 2022-08-18 | End: 2022-11-17

## 2022-08-18 NOTE — TELEPHONE ENCOUNTER
Routing refill request to provider for review/approval because:  Labs out of range:  TSH     TSH   Date Value Ref Range Status   06/16/2022 0.21 (L) 0.40 - 4.00 mU/L Final   11/12/2020 1.14 0.40 - 4.00 mU/L Final     Gali SEPULVEDA RN   Patient Advocate Liaison (PAL)  [facility]

## 2022-09-29 ENCOUNTER — OFFICE VISIT (OUTPATIENT)
Dept: PODIATRY | Facility: CLINIC | Age: 72
End: 2022-09-29
Payer: COMMERCIAL

## 2022-09-29 VITALS
SYSTOLIC BLOOD PRESSURE: 150 MMHG | HEIGHT: 66 IN | DIASTOLIC BLOOD PRESSURE: 70 MMHG | BODY MASS INDEX: 21.05 KG/M2 | WEIGHT: 131 LBS

## 2022-09-29 DIAGNOSIS — M79.675 GREAT TOE PAIN, LEFT: ICD-10-CM

## 2022-09-29 DIAGNOSIS — L60.0 INGROWING LEFT GREAT TOENAIL: Primary | ICD-10-CM

## 2022-09-29 PROCEDURE — 99203 OFFICE O/P NEW LOW 30 MIN: CPT | Performed by: PODIATRIST

## 2022-09-29 ASSESSMENT — PAIN SCALES - GENERAL: PAINLEVEL: NO PAIN (0)

## 2022-09-29 NOTE — PROGRESS NOTES
ASSESSMENT:  Encounter Diagnoses   Name Primary?     Ingrowing left great toenail Yes     Great toe pain, left      MEDICAL DECISION MAKING:  Her history and clinical presentation is consistent with ingrown toenail edges, left hallux medial lateral edge.  No clinical signs of infection.    Conservative management was discussed.  This includes maintenance involving regular trimming.  I discussed the option of foot specialist and/or foot care nurses that do this.  We discussed the importance of avoiding tight footwear.    I also reviewed procedure options including partial nail avulsion and partial chemical matrixectomy.  The post procedure recommendations/course were also discussed.    She and her  are interested in partial nail avulsions, medial and lateral edge, left hallux.    This will be scheduled for a procedure only 30-minute appointment in the near future.  All questions were answered.    Disclaimer: This note consists of symbols derived from keyboarding, dictation and/or voice recognition software. As a result, there may be errors in the script that have gone undetected. Please consider this when interpreting information found in this chart.    Phong Mckeon, TOMER, FACFAS, MS    [facility] & Ankle [facility]      ____________________________________________________________________    HPI:       Mily presents today complaining of pain associated with her left great toe.  She associates it to the nail.  She has noted progressive thickening of the nail.  Her  has helped trim it.  This does provide some pain relief.  She inquires about options.  No other foot complaints.  *  Past Medical History:   Diagnosis Date     Arthritis      Breast cancer (H) 2000    right breast     Hypercholesterolemia      Hypertension      Hypothyroidism    *  *  Past Surgical History:   Procedure Laterality Date     Bilateral Foot surgery  1976     COLONOSCOPY  12/16/2014    Dr. Dayday LOZANO      "COLONOSCOPY N/A 12/4/2019    Procedure: COLONOSCOPY;  Surgeon: Catrachito Naylor MD;  Location:  GI     EYE SURGERY      cataract removal, macular hole repair     LUMPECTOMY BREAST  2000    Right   *  *  Current Outpatient Medications   Medication Sig Dispense Refill     alendronate (FOSAMAX) 70 MG tablet Take 1 tablet (70 mg) by mouth every 7 days 12 tablet 4     amLODIPine (NORVASC) 5 MG tablet Take 1 tablet (5 mg) by mouth once daily 90 tablet 2     atorvastatin (LIPITOR) 20 MG tablet Take 1 tablet (20 mg) by mouth daily 90 tablet 3     calcium acetate (PHOSLO) 667 MG CAPS capsule Take 667 mg by mouth daily       levothyroxine (SYNTHROID/LEVOTHROID) 75 MCG tablet Take 1 tablet (75 mcg) by mouth once daily 90 tablet 0     lisinopril (ZESTRIL) 40 MG tablet Take 1 tablet (40 mg) by mouth daily 90 tablet 3     Multiple Vitamins-Minerals (MULTIVITAMIN PO)        primidone (MYSOLINE) 50 MG tablet Take 4 tablets (200 mg) by mouth At Bedtime 270 tablet 1     Vitamin D, Cholecalciferol, 25 MCG (1000 UT) TABS            EXAM:    Vitals: BP (!) 150/70   Ht 1.676 m (5' 6\")   Wt 59.4 kg (131 lb)   BMI 21.14 kg/m    BMI: Body mass index is 21.14 kg/m .    Constitutional:  Mily Hilliard is in no apparent distress, appears well-nourished.  Cooperative with history and physical exam.    Vascular:  Pedal pulses are palpable for both the DP and PT arteries.  CFT < 3 sec.  No edema.      Neuro: Light touch sensation is intact to the L4, L5, S1 distributions  No evidence of weakness, spasticity, or contracture in the lower extremities.     Derm: Normal texture and turgor.  No erythema, ecchymosis, or cyanosis.  No open lesions.     The left hallux nail is mildly thickened.  It is incurvated.  Tenderness on palpation to the skin folds, medial greater than lateral.  No erythema.  No drainage.    Musculoskeletal:    Lower extremity muscle strength is normal. No gross deformities.  Adequate ankle and subtalar joint range of " motion.

## 2022-09-29 NOTE — LETTER
9/29/2022         RE: Mily Hilliard  [address]  [place] [address]        Dear Colleague,    Thank you for referring your patient, Mily Hilliard, to the [facility] PODIATRY. Please see a copy of my visit note below.    ASSESSMENT:  Encounter Diagnoses   Name Primary?     Ingrowing left great toenail Yes     Great toe pain, left      MEDICAL DECISION MAKING:  Her history and clinical presentation is consistent with ingrown toenail edges, left hallux medial lateral edge.  No clinical signs of infection.    Conservative management was discussed.  This includes maintenance involving regular trimming.  I discussed the option of foot specialist and/or foot care nurses that do this.  We discussed the importance of avoiding tight footwear.    I also reviewed procedure options including partial nail avulsion and partial chemical matrixectomy.  The post procedure recommendations/course were also discussed.    She and her  are interested in partial nail avulsions, medial and lateral edge, left hallux.    This will be scheduled for a procedure only 30-minute appointment in the near future.  All questions were answered.    Disclaimer: This note consists of symbols derived from keyboarding, dictation and/or voice recognition software. As a result, there may be errors in the script that have gone undetected. Please consider this when interpreting information found in this chart.    Phong Mckeon DPM, FACFAS, [facility] Department of Podiatry/Foot & Ankle Surgery      ____________________________________________________________________    HPI:       Mily presents today complaining of pain associated with her left great toe.  She associates it to the nail.  She has noted progressive thickening of the nail.  Her  has helped trim it.  This does provide some pain relief.  She inquires about options.  No other foot complaints.  *  Past Medical History:   Diagnosis Date      "Arthritis      Breast cancer (H) 2000    right breast     Hypercholesterolemia      Hypertension      Hypothyroidism    *  *  Past Surgical History:   Procedure Laterality Date     Bilateral Foot surgery  1976     COLONOSCOPY  12/16/2014    Dr. Naylor [facility]     COLONOSCOPY N/A 12/4/2019    Procedure: COLONOSCOPY;  Surgeon: Catrachito Naylor MD;  Location:  GI     EYE SURGERY      cataract removal, macular hole repair     LUMPECTOMY BREAST  2000    Right   *  *  Current Outpatient Medications   Medication Sig Dispense Refill     alendronate (FOSAMAX) 70 MG tablet Take 1 tablet (70 mg) by mouth every 7 days 12 tablet 4     amLODIPine (NORVASC) 5 MG tablet Take 1 tablet (5 mg) by mouth once daily 90 tablet 2     atorvastatin (LIPITOR) 20 MG tablet Take 1 tablet (20 mg) by mouth daily 90 tablet 3     calcium acetate (PHOSLO) 667 MG CAPS capsule Take 667 mg by mouth daily       levothyroxine (SYNTHROID/LEVOTHROID) 75 MCG tablet Take 1 tablet (75 mcg) by mouth once daily 90 tablet 0     lisinopril (ZESTRIL) 40 MG tablet Take 1 tablet (40 mg) by mouth daily 90 tablet 3     Multiple Vitamins-Minerals (MULTIVITAMIN PO)        primidone (MYSOLINE) 50 MG tablet Take 4 tablets (200 mg) by mouth At Bedtime 270 tablet 1     Vitamin D, Cholecalciferol, 25 MCG (1000 UT) TABS            EXAM:    Vitals: BP (!) 150/70   Ht 1.676 m (5' 6\")   Wt 59.4 kg (131 lb)   BMI 21.14 kg/m    BMI: Body mass index is 21.14 kg/m .    Constitutional:  Mily Hilliard is in no apparent distress, appears well-nourished.  Cooperative with history and physical exam.    Vascular:  Pedal pulses are palpable for both the DP and PT arteries.  CFT < 3 sec.  No edema.      Neuro: Light touch sensation is intact to the L4, L5, S1 distributions  No evidence of weakness, spasticity, or contracture in the lower extremities.     Derm: Normal texture and turgor.  No erythema, ecchymosis, or cyanosis.  No open lesions.     The left hallux nail is mildly " thickened.  It is incurvated.  Tenderness on palpation to the skin folds, medial greater than lateral.  No erythema.  No drainage.    Musculoskeletal:    Lower extremity muscle strength is normal. No gross deformities.  Adequate ankle and subtalar joint range of motion.          Again, thank you for allowing me to participate in the care of your patient.        Sincerely,        Phong Mckeon DPM

## 2022-10-06 ENCOUNTER — OFFICE VISIT (OUTPATIENT)
Dept: PODIATRY | Facility: CLINIC | Age: 72
End: 2022-10-06
Payer: COMMERCIAL

## 2022-10-06 VITALS
DIASTOLIC BLOOD PRESSURE: 75 MMHG | HEART RATE: 70 BPM | WEIGHT: 131 LBS | SYSTOLIC BLOOD PRESSURE: 124 MMHG | BODY MASS INDEX: 21.14 KG/M2

## 2022-10-06 DIAGNOSIS — M79.675 GREAT TOE PAIN, LEFT: ICD-10-CM

## 2022-10-06 DIAGNOSIS — L60.0 INGROWING LEFT GREAT TOENAIL: Primary | ICD-10-CM

## 2022-10-06 PROCEDURE — 11730 AVULSION NAIL PLATE SIMPLE 1: CPT | Mod: TA | Performed by: PODIATRIST

## 2022-10-06 NOTE — PATIENT INSTRUCTIONS
INGROWN TOENAIL REMOVAL HOME CARE  1. Keep bandage on until that evening or the day after your procedure. If the bandage falls off, start the soaking process.    2. Some bleeding is normal. If bleeding seems excessive to you, place ice on top of your foot for 15-20 minutes and elevate your foot above heart level.    3. Over the counter pain medication (tylenol / ibuprofen), elevating your foot and ice application is all you will need for pain control.    4. If the bandage feels too tight and your toe is throbbing it is ok to remove the bandage and start soaking.     5. For one to two weeks, soak your foot twice a day in mild skin friendly soap (dish or hand soap) and warm water for 15 minutes. It is ok to soak your foot for a few minutes to loosen the dressing applied in the clinic. After soaking, blot dry and apply a regular band aid.    6. It is normal to experience some discomfort and redness around the nail for several days following the procedure. Drainage will likely appear a red - yellow. This is normal. If your toe is still draining a red - yellow fluid after 2 weeks keep continuing to soak foot another few days.    7. Initial discomfort might last for 2-3 days. You may resume with regular activity as soon as you are comfortable, as long as you keep the wound clean and dry and follow the soaking instruction. It is recommended that you do not enter public swimming pools/hot tubs while your toe is draining.    8. If you are experiencing worsening pain and redness or notice pus after 2-3 days please contact the clinic. Ask to speak with a triage nurse and they will inform our team of your symptoms and we can advise if a follow up is needed.

## 2022-10-06 NOTE — LETTER
"    10/6/2022         RE: Mily Hilliard  [address]  [place] [address]        Dear Colleague,    Thank you for referring your patient, Mily Hilliard, to the [facility] PODIATRY. Please see a copy of my visit note below.    [place] PODIAT[facility]/FOOT & ANKLE SURGERY    Mily returns for partial nail avulsion, medial and lateral edge of the left hallux.  This is for treatment of a painful ingrown toenail.  Please refer to the 9/29/2022 clinic note for details.  No interval changes.  The avulsion procedure as well as a partial chemical matrixectomy was reviewed.  She would like to proceed with the avulsion.    Pedal pulses are palpable on the left  The left hallux nail is incurvated  Pain on palpation to the medial lateral nail unit    Encounter Diagnoses   Name Primary?     Ingrowing left great toenail Yes     Great toe pain, left        Nail Avulsion Procedure, Bilateral edges  (non permanent removal)    The procedure was discussed with Mily Hilliard, including risk of infection, abnormal nail regrowth, and possible need for a future, repeat nail procedure.  Post-procedure home cares were reviewed.  These cares are important for preventing infection and aiding in timely healing.   Verbal and written consent was obtained.   The site was marked and the \"Time Out\" called.     The base of the left hallux was injected with 2 cc of  2% Lidocaine plain.  The toe was then prepped with betadine solution. A tourniquet was placed around the base of the toe for hemostasis.   Next the toe was checked for adequate anesthesia.     The medial and lateral aspect of the nail was freed from the nail bed and marginal soft tissue attachments with a blunt instrument. A nail splitter was then used to make a longitudinal cut approximately 2 mm from each nail fold.  It was completed on both sides, atraumatically, under the eponychium with a [place] blade. Next, the medial and lateral nail edges were " grasped with a hemostat and removed in total.      The tourniquet was removed.  Bacitracin ointment was applied to the nail bed edges, followed by a compressive dressing. The foot was kept elevated for several minutes.  The procedure was tolerated well without complication.     Mily Martinez instructed to watch for, and call if,  increasing redness, drainage, and pain after 2-3 days.  Post procedure instructions provided - handout given.    Phong Mckeon DPM            Again, thank you for allowing me to participate in the care of your patient.        Sincerely,        Phong Mckeon DPM

## 2022-10-06 NOTE — PROGRESS NOTES
"[place] PODIATRY/FOOT & ANKLE SURGERY    Mily returns for partial nail avulsion, medial and lateral edge of the left hallux.  This is for treatment of a painful ingrown toenail.  Please refer to the 9/29/2022 clinic note for details.  No interval changes.  The avulsion procedure as well as a partial chemical matrixectomy was reviewed.  She would like to proceed with the avulsion.    Pedal pulses are palpable on the left  The left hallux nail is incurvated  Pain on palpation to the medial lateral nail unit    Encounter Diagnoses   Name Primary?     Ingrowing left great toenail Yes     Great toe pain, left        Nail Avulsion Procedure, Bilateral edges  (non permanent removal)    The procedure was discussed with Mily Hilliard, including risk of infection, abnormal nail regrowth, and possible need for a future, repeat nail procedure.  Post-procedure home cares were reviewed.  These cares are important for preventing infection and aiding in timely healing.   Verbal and written consent was obtained.   The site was marked and the \"Time Out\" called.     The base of the left hallux was injected with 2 cc of  2% Lidocaine plain.  The toe was then prepped with betadine solution. A tourniquet was placed around the base of the toe for hemostasis.   Next the toe was checked for adequate anesthesia.     The medial and lateral aspect of the nail was freed from the nail bed and marginal soft tissue attachments with a blunt instrument. A nail splitter was then used to make a longitudinal cut approximately 2 mm from each nail fold.  It was completed on both sides, atraumatically, under the eponychium with a [place] blade. Next, the medial and lateral nail edges were grasped with a hemostat and removed in total.      The tourniquet was removed.  Bacitracin ointment was applied to the nail bed edges, followed by a compressive dressing. The foot was kept elevated for several minutes.  The procedure was tolerated well without " complication.     Mily Martinez instructed to watch for, and call if,  increasing redness, drainage, and pain after 2-3 days.  Post procedure instructions provided - handout given.    Phong Mckeon DPM

## 2022-10-16 ENCOUNTER — HEALTH MAINTENANCE LETTER (OUTPATIENT)
Age: 72
End: 2022-10-16

## 2022-12-18 ENCOUNTER — MYC MEDICAL ADVICE (OUTPATIENT)
Dept: PEDIATRICS | Facility: CLINIC | Age: 72
End: 2022-12-18

## 2022-12-18 DIAGNOSIS — S32.010A COMPRESSION FRACTURE OF L1 VERTEBRA, INITIAL ENCOUNTER (H): Primary | ICD-10-CM

## 2022-12-19 NOTE — TELEPHONE ENCOUNTER
Spine referral signed, I would recommend that she be seen for the shoulder pain. This can either be here in clinic or can place referral to orthopedics if their access is better - based on description I do wonder about a frozen shoulder or rotator cuff tear but difficult to assess without seeing her in person.    Meri Pacheco MD  Internal Medicine-Pediatrics

## 2022-12-19 NOTE — TELEPHONE ENCOUNTER
Pt calling back. Is taking the recommended fosamax, calcium & vitamin d supplement.     1. R hip & lower back pain:  - R hip & lower back pain is from the old compression fracture, but getting worse than before  - tries chiro & massage time to time  - had a deep tissue massage this morning  - denies pain/tingling/numbness radiating to LE, saddle pain, bladder or bowel incontinence  - able to bend or twist    Willing to f/u with spine specialist. Pended referral, please review & sign if appropriate.     2. R shoulder pain:  - has been experiencing R should pain for 2-3 weeks now  - unable to lift R arm over her head or reach the back  - denies fall/injury, redness, swelling, pain/lump in the armpit, tingling/numbess/weakness in finger tips, neck pain, pain radiating to neck/upper back  - has been using the voltaren gel which has been helping  - doing yoga to stretch     YANCI Whelan  Patient Advocate Liason (PAL)  [facility]  Ph. [phone] / Fax. [phone]

## 2022-12-19 NOTE — TELEPHONE ENCOUNTER
Can we get more information - Is this new back pain or pain related to her compression fracture? If compression fracture pain, she should probably see spine specialist. However I don't ever see that she has been seen for shoulder pain so would need to be seen to address this.    Meri Pacheco MD  Internal Medicine-Pediatrics

## 2022-12-19 NOTE — TELEPHONE ENCOUNTER
- please review pt's  message & advise.    Pt was seen by  on 7/14/22. Does she need any imaging at this time? Appointment with Kylah?    YANCI Whelan  Patient Advocate Liason (PAL)  [facility]  Ph. [phone] / Fax. [phone]

## 2022-12-19 NOTE — TELEPHONE ENCOUNTER
Called pt back at [phone] to get details, not available, so LM to call me back at [phone]. Will await for her call back(need to know more about her R shoulder pain - is this new?).    Hx: Pt has compression fracture of L1 & severe osteoporosis. Was advised to start fosamax weekly, calcium 1200 mg daily & vitamin D 1000 IUs daily during the OV on 7/14/22.     YANCI Whelan  Patient Advocate Liason (PAL)  [facility]  Ph. [phone] / Fax. [phone]

## 2022-12-20 NOTE — TELEPHONE ENCOUNTER
Pt calling back. Went over the recommendations by . Provided spine referral contact info to call & schedule. Pt prefers to see  for shoulder pain rather than going to ortho at this time. So, scheduled an OV with  on 12/28 for an eval.    YANCI Whelan  Patient Advocate Liason (PAL)  [facility]  Ph. [phone] / Fax. [phone]

## 2022-12-20 NOTE — TELEPHONE ENCOUNTER
Called pt back at [phone], not available, so LM to call me back. Will await for her call back.    YANCI Whelan  Patient Advocate Liason (PAL)  [facility]  Ph. [phone] / Fax. [phone]

## 2022-12-28 ENCOUNTER — OFFICE VISIT (OUTPATIENT)
Dept: PEDIATRICS | Facility: CLINIC | Age: 72
End: 2022-12-28
Payer: COMMERCIAL

## 2022-12-28 ENCOUNTER — ANCILLARY PROCEDURE (OUTPATIENT)
Dept: GENERAL RADIOLOGY | Facility: CLINIC | Age: 72
End: 2022-12-28
Attending: INTERNAL MEDICINE
Payer: COMMERCIAL

## 2022-12-28 VITALS
HEIGHT: 66 IN | OXYGEN SATURATION: 100 % | TEMPERATURE: 97.7 F | WEIGHT: 131 LBS | BODY MASS INDEX: 21.05 KG/M2 | SYSTOLIC BLOOD PRESSURE: 160 MMHG | DIASTOLIC BLOOD PRESSURE: 70 MMHG | HEART RATE: 61 BPM

## 2022-12-28 DIAGNOSIS — G89.29 CHRONIC RIGHT-SIDED LOW BACK PAIN WITHOUT SCIATICA: ICD-10-CM

## 2022-12-28 DIAGNOSIS — M54.50 CHRONIC RIGHT-SIDED LOW BACK PAIN WITHOUT SCIATICA: ICD-10-CM

## 2022-12-28 DIAGNOSIS — I10 ESSENTIAL HYPERTENSION: Primary | ICD-10-CM

## 2022-12-28 DIAGNOSIS — M25.511 ACUTE PAIN OF RIGHT SHOULDER: ICD-10-CM

## 2022-12-28 PROCEDURE — 73030 X-RAY EXAM OF SHOULDER: CPT | Mod: TC | Performed by: RADIOLOGY

## 2022-12-28 PROCEDURE — 99214 OFFICE O/P EST MOD 30 MIN: CPT | Performed by: INTERNAL MEDICINE

## 2022-12-28 PROCEDURE — 36415 COLL VENOUS BLD VENIPUNCTURE: CPT | Performed by: INTERNAL MEDICINE

## 2022-12-28 PROCEDURE — 80048 BASIC METABOLIC PNL TOTAL CA: CPT | Performed by: INTERNAL MEDICINE

## 2022-12-28 PROCEDURE — 72100 X-RAY EXAM L-S SPINE 2/3 VWS: CPT | Mod: TC | Performed by: RADIOLOGY

## 2022-12-28 RX ORDER — CYCLOBENZAPRINE HCL 5 MG
5 TABLET ORAL 3 TIMES DAILY PRN
Qty: 30 TABLET | Refills: 0 | Status: SHIPPED | OUTPATIENT
Start: 2022-12-28 | End: 2024-02-13

## 2022-12-28 NOTE — PROGRESS NOTES
Assessment & Plan     Essential hypertension  BP quite elevated for patient today. Anticipate likely related to pain and high doses of NSAIDs, will obtain BMP today and ask to cut back on NSAIDs as below. SB3 PAL to follow-up in 2 weeks with repeat BPs.   - Basic metabolic panel  (Ca, Cl, CO2, Creat, Gluc, K, Na, BUN)    Chronic right-sided low back pain without sciatica  Given onset in September concerning for possible new compression fx or other etiology. Lumbar films today to look for new compression fx, and if unrevealing will proceed with MRI prior to spine appointment later next month. In the meantime, will have patient continue with heat and acetaminophen 1000mg TID and back off on NSAIDs. Will try low dose muscle relaxant, discussed risks of sedation, confusion, falls , etc.   - XR Lumbar Spine 2/3 Views; Future  - cyclobenzaprine (FLEXERIL) 5 MG tablet; Take 1 tablet (5 mg) by mouth 3 times daily as needed for muscle spasms    Acute pain of right shoulder  Anticipate likely adhesive capsulitis vs OA vs component of both. Imagining today in clinic, will refer to [facility] for possible injections pending results.   - XR Shoulder Right G/E 3 Views; Future  - Orthopedic  Referral; Future         Patient Instructions   Shoulder pain:  - X-rays today  - you will be called to schedule with orthopedics to discuss treatment options    Back pain:  - stop ibuprofen if you can  - acetaminophen 1000mg every 8 hours  - use muscle relaxant (cyclobenzaprine) sparingly up to 3 times daily. Can make you dizzy or sleepy  - regular heat  - X-rays today and perhaps MRI depending on results  - follow-up with spine as scheduled.     Check blood pressure at home - I will have Cleopatra call in about 2 weeks.       Return if symptoms worsen or fail to improve.    Meri Collado MD  [facility] LEON Minor is a 72 year old, presenting for the following health issues:  Shoulder Pain    Pain  "History:  When did you first notice your pain? - Acute Pain   Have you seen anyone else for your pain? No  Where in your body do you have pain? Musculoskeletal problem/pain  Onset/Duration: a couple months   Description  Location: right lower/middle back radiating into right side of neck and right shoulder   Joint Swelling: No  Redness: No  Pain: YES, sharp and shooting   Warmth: No  Intensity:  moderate, severe  Progression of Symptoms:  worsening  Accompanying signs and symptoms:   Fevers: No  Numbness/tingling/weakness: YES  History  Trauma to the area: YES, pt fell 2 stories on her back when she was youger.  Recent illness:  No  Previous similar problem: YES  Previous evaluation:  YES  Precipitating or alleviating factors:  Aggravating factors include: standing or sitting for long periods of time   Therapies tried and outcome: massage, heat, Ibuprofen and chiropractor    Mily has two separate issues:    Back pain: Ongoing and gradually worsening since likely about September of this past year. Feels good when laying down at night, but worsens throughout the day. Radiates to R side and is in lower back. No injury she can recall. Has gotten a massage and told she was \"very knotted up\". Hx of L1 compression fracture noted over the summer. Is now taking 800mg ibuprofen 3 times daily and still with significantly debilitating pain.     R shoulder pain: Worsening over the past 2 weeks or so. Cannot lift arm above level of her shoulder any more. No injury. Thinks she has arthritis but is not sure.     Review of Systems   Constitutional, MSK systems are negative, except as otherwise noted.      Objective    BP (!) 160/70 (BP Location: Left arm, Patient Position: Sitting, Cuff Size: Adult Regular)   Pulse 61   Temp 97.7  F (36.5  C) (Temporal)   Ht 1.676 m (5' 6\")   Wt 59.4 kg (131 lb)   SpO2 100%   BMI 21.14 kg/m    Body mass index is 21.14 kg/m .  Physical Exam   GENERAL: healthy, alert and no distress  BACK: no " spinal process tenderness and DEVAN tests negative, tenderness of lumbar paraspinal muscles. Limited abduction of R shoulder and pain along AC joint and of posterior shoulder.

## 2022-12-28 NOTE — PATIENT INSTRUCTIONS
Shoulder pain:  - X-rays today  - you will be called to schedule with orthopedics to discuss treatment options    Back pain:  - stop ibuprofen if you can  - acetaminophen 1000mg every 8 hours  - use muscle relaxant (cyclobenzaprine) sparingly up to 3 times daily. Can make you dizzy or sleepy  - regular heat  - X-rays today and perhaps MRI depending on results  - follow-up with spine as scheduled.     Check blood pressure at home - I will have Cleopatra call in about 2 weeks.

## 2022-12-29 LAB
ANION GAP SERPL CALCULATED.3IONS-SCNC: 12 MMOL/L (ref 7–15)
BUN SERPL-MCNC: 16.6 MG/DL (ref 8–23)
CALCIUM SERPL-MCNC: 9.6 MG/DL (ref 8.8–10.2)
CHLORIDE SERPL-SCNC: 103 MMOL/L (ref 98–107)
CREAT SERPL-MCNC: 0.65 MG/DL (ref 0.51–0.95)
DEPRECATED HCO3 PLAS-SCNC: 22 MMOL/L (ref 22–29)
GFR SERPL CREATININE-BSD FRML MDRD: >90 ML/MIN/1.73M2
GLUCOSE SERPL-MCNC: 77 MG/DL (ref 70–99)
POTASSIUM SERPL-SCNC: 4.5 MMOL/L (ref 3.4–5.3)
SODIUM SERPL-SCNC: 137 MMOL/L (ref 136–145)

## 2023-01-02 ENCOUNTER — TELEPHONE (OUTPATIENT)
Dept: PEDIATRICS | Facility: CLINIC | Age: 73
End: 2023-01-02
Payer: COMMERCIAL

## 2023-01-02 DIAGNOSIS — M54.50 CHRONIC RIGHT-SIDED LOW BACK PAIN WITHOUT SCIATICA: Primary | ICD-10-CM

## 2023-01-02 DIAGNOSIS — G89.29 CHRONIC RIGHT-SIDED LOW BACK PAIN WITHOUT SCIATICA: Primary | ICD-10-CM

## 2023-01-02 DIAGNOSIS — S32.010A COMPRESSION FRACTURE OF L1 VERTEBRA, INITIAL ENCOUNTER (H): ICD-10-CM

## 2023-01-02 NOTE — TELEPHONE ENCOUNTER
Please call patient. Lumbar XR shows no new compression fracture but significant arthritis changes (degenerative changes) and scoliosis that is likely accounting for her pain. Given duration and how this is worsening would recommend MRI as next step prior to her follow-up with spine specialist. This has been ordered, can we assist her in scheduling and make sure she thinks she can lay still long enough for this to be done? Thank!    Meri Pacheco MD  Internal Medicine-Pediatrics

## 2023-01-03 NOTE — TELEPHONE ENCOUNTER
I see that pt is already scheduled for lumbar MRI on 1/10, Ortho visit on 1/11 & Spine specialist on 1/23.     Called pt at [phone] and advised that she need to lay still for extended period of time for the MRI. Pt says that she should be able to do that.     She states that she has been taking flexeril 5 mg in the morning(once a day) so far & it has been helping her lower back pain. She might try to take another dose before going to bed to see whether she can sleep better. Stopped ibuprofen & taking tylenol 1000 mg twice a day. Heat helps only a bit.     * HTN outreach:  - is currently on amlodipine 5 mg daily & lisinopril 40 mg daily  - haven't been checking BP at home yet  - leaving to [place] for her niece's wedding on 1/5 & coming back on 1/9  - is planning to check BP once she is back from her trip  - advised to check BP same time, same arm & after resting for 15 mins    Planning to call pt in the week on 1/16 to f/u on the home BP redings    Notes from 12/28/22:  Essential hypertension  BP quite elevated for patient today. Anticipate likely related to pain and high doses of NSAIDs, will obtain BMP today and ask to cut back on NSAIDs as below. SB3 PAL to follow-up in 2 weeks with repeat BPs.     YANCI Whelan  Patient Advocate Liason (PAL)  [facility]  Ph. [phone] / Fax. [phone]

## 2023-01-10 ENCOUNTER — HOSPITAL ENCOUNTER (OUTPATIENT)
Dept: MRI IMAGING | Facility: CLINIC | Age: 73
Discharge: HOME | End: 2023-01-10
Attending: INTERNAL MEDICINE | Admitting: INTERNAL MEDICINE
Payer: COMMERCIAL

## 2023-01-10 DIAGNOSIS — S32.010A COMPRESSION FRACTURE OF L1 VERTEBRA, INITIAL ENCOUNTER (H): ICD-10-CM

## 2023-01-10 DIAGNOSIS — G89.29 CHRONIC RIGHT-SIDED LOW BACK PAIN WITHOUT SCIATICA: ICD-10-CM

## 2023-01-10 DIAGNOSIS — M54.50 CHRONIC RIGHT-SIDED LOW BACK PAIN WITHOUT SCIATICA: ICD-10-CM

## 2023-01-10 PROCEDURE — 72148 MRI LUMBAR SPINE W/O DYE: CPT

## 2023-01-11 ENCOUNTER — OFFICE VISIT (OUTPATIENT)
Dept: ORTHOPEDICS | Facility: CLINIC | Age: 73
End: 2023-01-11
Attending: INTERNAL MEDICINE
Payer: COMMERCIAL

## 2023-01-11 VITALS — WEIGHT: 130 LBS | BODY MASS INDEX: 20.98 KG/M2 | SYSTOLIC BLOOD PRESSURE: 135 MMHG | DIASTOLIC BLOOD PRESSURE: 78 MMHG

## 2023-01-11 DIAGNOSIS — M25.511 ACUTE PAIN OF RIGHT SHOULDER: ICD-10-CM

## 2023-01-11 DIAGNOSIS — M75.50 BURSITIS OF SHOULDER, UNSPECIFIED LATERALITY: Primary | ICD-10-CM

## 2023-01-11 DIAGNOSIS — M19.019 SHOULDER ARTHRITIS: ICD-10-CM

## 2023-01-11 DIAGNOSIS — M89.9 DISORDER OF BONE, UNSPECIFIED: ICD-10-CM

## 2023-01-11 PROCEDURE — 20610 DRAIN/INJ JOINT/BURSA W/O US: CPT | Mod: RT | Performed by: ORTHOPAEDIC SURGERY

## 2023-01-11 PROCEDURE — 99203 OFFICE O/P NEW LOW 30 MIN: CPT | Mod: 25 | Performed by: ORTHOPAEDIC SURGERY

## 2023-01-11 RX ADMIN — BUPIVACAINE HYDROCHLORIDE 1 ML: 5 INJECTION, SOLUTION PERINEURAL at 09:59

## 2023-01-11 RX ADMIN — TRIAMCINOLONE ACETONIDE 40 MG: 40 INJECTION, SUSPENSION INTRA-ARTICULAR; INTRAMUSCULAR at 09:59

## 2023-01-11 RX ADMIN — LIDOCAINE HYDROCHLORIDE 2 ML: 10 INJECTION, SOLUTION INFILTRATION; PERINEURAL at 09:59

## 2023-01-11 NOTE — PROGRESS NOTES
S:Patient is a 72 year old, left hand dominant female seen today in consultation for right shoulder pain.  They report onset of symptoms stiff, sore trouble with ROM . Patient does not recall injury, states it happened over time .   Pain is located around the entire shoulder, but states it is on the posterior aspect of shoulder, and described as dull pain .  Increased pain with using the shoulder.  Pain does not wake at nighttime. Pain is improved by topical creams, tylenol.  They have tried the following therapies:  Yoga.    Current pain level: 3/10, Worst pain level: 8/10.   Patient had xray's completed   Patient is currently working retired.            Patient Active Problem List   Diagnosis     Hypothyroidism     Hypertension     Hypercholesterolemia     Essential tremor            Past Medical History:   Diagnosis Date     Arthritis      Breast cancer (H)     right breast     Hypercholesterolemia      Hypertension      Hypothyroidism             Past Surgical History:   Procedure Laterality Date     Bilateral Foot surgery       COLONOSCOPY  2014    Dr. Naylor [facility]     COLONOSCOPY N/A 2019    Procedure: COLONOSCOPY;  Surgeon: Catrachito Naylor MD;  Location:  GI     EYE SURGERY      cataract removal, macular hole repair     LUMPECTOMY BREAST      Right            Social History     Tobacco Use     Smoking status: Former     Packs/day: 0.00     Years: 0.00     Pack years: 0.00     Types: Cigarettes     Quit date: 2003     Years since quittin.0     Smokeless tobacco: Never   Substance Use Topics     Alcohol use: Yes     Alcohol/week: 11.7 standard drinks     Comment: occ            Family History   Problem Relation Age of Onset     Cancer - colorectal Mother         at age 94     Breast Cancer Mother      Diabetes Sister         pre-diabetic     Breast Cancer Sister      Macular Degeneration Father      Bladder Cancer Father      Stomach Cancer Maternal Grandfather       Diabetes Sister      Breast Cancer Sister                Allergies   Allergen Reactions     No Known Allergies             Current Outpatient Medications   Medication Sig Dispense Refill     alendronate (FOSAMAX) 70 MG tablet Take 1 tablet (70 mg) by mouth every 7 days 12 tablet 4     amLODIPine (NORVASC) 5 MG tablet Take 1 tablet (5 mg) by mouth once daily 90 tablet 2     atorvastatin (LIPITOR) 20 MG tablet Take 1 tablet (20 mg) by mouth daily 90 tablet 3     calcium acetate (PHOSLO) 667 MG CAPS capsule Take 667 mg by mouth daily       cyclobenzaprine (FLEXERIL) 5 MG tablet Take 1 tablet (5 mg) by mouth 3 times daily as needed for muscle spasms 30 tablet 0     levothyroxine (SYNTHROID/LEVOTHROID) 75 MCG tablet Take 1 tablet (75 mcg) by mouth once daily 90 tablet 1     lisinopril (ZESTRIL) 40 MG tablet Take 1 tablet (40 mg) by mouth daily 90 tablet 3     Multiple Vitamins-Minerals (MULTIVITAMIN PO)        primidone (MYSOLINE) 50 MG tablet Take 4 tablets (200 mg) by mouth once nightly At Bedtime 270 tablet 1     Vitamin D, Cholecalciferol, 25 MCG (1000 UT) TABS             Review Of Systems  Skin: negative  Eyes: negative  Ears/Nose/Throat: negative  Respiratory: No shortness of breath, dyspnea on exertion, cough, or hemoptysis    O: Physical Exam:  Pain to palpation glenohumeral articulation about anterior inferior glenohumeral ligament and posterolateral capsule.  CMS intact.    Images :  IMPRESSION: Mild glenohumeral and acromioclavicular joint degenerative change. There is no evidence of an acute fracture.     Chronic appearing area of calcification/mineralization and surgical clips in the right chest wall/axillary region. Atherosclerotic vascular calcifications         A: Glenohumeral arthropathy R shoulder    P: Inject R shoulder after verbal and written consent, ethyl chloride/chloroprep.  Follow outcomes  See back 6-12 weeks      Large Joint Injection/Arthocentesis: R glenohumeral joint    Date/Time:  1/11/2023 9:59 AM  Performed by: Phong Almanzar MD  Authorized by: Phong Almanzar MD     Indications:  Pain  Needle Size:  25 G  Guidance: landmark guided    Location:  Shoulder      Site:  R glenohumeral joint  Medications:  1 mL bupivacaine 0.5 %; 2 mL lidocaine 1 %; 40 mg triamcinolone 40 MG/ML  Medications comment:  2mL Bupivacaine   Outcome:  Tolerated well, no immediate complications  Procedure discussed: discussed risks, benefits, and alternatives    Consent Given by:  Patient  Timeout: timeout called immediately prior to procedure    Prep: patient was prepped and draped in usual sterile fashion        I performed the injection.         In addition to the above assessment and plan each active problem on Mily's problem list was evaluated today. This included the questioning of Mily for any medication problems. We will continue the current treatment plan for these active problems except as noted.

## 2023-01-11 NOTE — PATIENT INSTRUCTIONS
Thank you for choosing [facility]    Dr. Almanzar Locations:    [facility]   [id number] [place] [address], [address]  [place], [place] [address] [place] [address]   Appointments: [phone] Appointments: [phone]   Fax: [phone] Fax: [phone]       Follow up: 2 months after labs have been completed Please call [phone] to schedule your follow up appointment.     Lab orders have been placed evaluating for right shoulder. You can go to your [facility] location that has a lab. Please call your [facility] to schedule a lab appointment. [facility] has a walk-in hospital lab for your convenience.     Steroid injection of the right shoulder was performed today in clinic    - Would not soak in a hot tub, bath or swimming pool for 48 hours  - Ok to shower  - Ice today and only do your normal amounts of activity  - The lidocaine (what is giving you pain relief right now) will likely stop working in 1-2 hours.  You will then have pain again, similar to before you received the injection. The corticosteroid will not start working until approximately 1-2 weeks from now.  In a small percentage of people, cortisone can cause flushing/redness in the face. This usually lasts for 1-3 days and resolves. Cool compress and Ibuprofen/Tylenol can help if this happens.    Physical Therapy orders have been placed with [facility] ([facility])  You can call [phone] to schedule at your convenience.        For any questions please contact my office, [phone].

## 2023-01-11 NOTE — LETTER
2023         RE: Mily Hilliard  [address]  [place] [address]        Dear Colleague,    Thank you for referring your patient, Mily Hilliard, to the [facility]. Please see a copy of my visit note below.    S:Patient is a 72 year old, left hand dominant female seen today in consultation for right shoulder pain.  They report onset of symptoms stiff, sore trouble with ROM . Patient does not recall injury, states it happened over time .   Pain is located around the entire shoulder, but states it is on the posterior aspect of shoulder, and described as dull pain .  Increased pain with using the shoulder.  Pain does not wake at nighttime. Pain is improved by topical creams, tylenol.  They have tried the following therapies:  Yoga.    Current pain level: 3/10, Worst pain level: 8/10.   Patient had xray's completed   Patient is currently working retired.            Patient Active Problem List   Diagnosis     Hypothyroidism     Hypertension     Hypercholesterolemia     Essential tremor            Past Medical History:   Diagnosis Date     Arthritis      Breast cancer (H)     right breast     Hypercholesterolemia      Hypertension      Hypothyroidism             Past Surgical History:   Procedure Laterality Date     Bilateral Foot surgery       COLONOSCOPY  2014    Dr. Naylor [facility]     COLONOSCOPY N/A 2019    Procedure: COLONOSCOPY;  Surgeon: Catrachito Naylor MD;  Location:  GI     EYE SURGERY      cataract removal, macular hole repair     LUMPECTOMY BREAST      Right            Social History     Tobacco Use     Smoking status: Former     Packs/day: 0.00     Years: 0.00     Pack years: 0.00     Types: Cigarettes     Quit date: 2003     Years since quittin.0     Smokeless tobacco: Never   Substance Use Topics     Alcohol use: Yes     Alcohol/week: 11.7 standard drinks     Comment: occ            Family History   Problem  Relation Age of Onset     Cancer - colorectal Mother         at age 94     Breast Cancer Mother      Diabetes Sister         pre-diabetic     Breast Cancer Sister      Macular Degeneration Father      Bladder Cancer Father      Stomach Cancer Maternal Grandfather      Diabetes Sister      Breast Cancer Sister                Allergies   Allergen Reactions     No Known Allergies             Current Outpatient Medications   Medication Sig Dispense Refill     alendronate (FOSAMAX) 70 MG tablet Take 1 tablet (70 mg) by mouth every 7 days 12 tablet 4     amLODIPine (NORVASC) 5 MG tablet Take 1 tablet (5 mg) by mouth once daily 90 tablet 2     atorvastatin (LIPITOR) 20 MG tablet Take 1 tablet (20 mg) by mouth daily 90 tablet 3     calcium acetate (PHOSLO) 667 MG CAPS capsule Take 667 mg by mouth daily       cyclobenzaprine (FLEXERIL) 5 MG tablet Take 1 tablet (5 mg) by mouth 3 times daily as needed for muscle spasms 30 tablet 0     levothyroxine (SYNTHROID/LEVOTHROID) 75 MCG tablet Take 1 tablet (75 mcg) by mouth once daily 90 tablet 1     lisinopril (ZESTRIL) 40 MG tablet Take 1 tablet (40 mg) by mouth daily 90 tablet 3     Multiple Vitamins-Minerals (MULTIVITAMIN PO)        primidone (MYSOLINE) 50 MG tablet Take 4 tablets (200 mg) by mouth once nightly At Bedtime 270 tablet 1     Vitamin D, Cholecalciferol, 25 MCG (1000 UT) TABS             Review Of Systems  Skin: negative  Eyes: negative  Ears/Nose/Throat: negative  Respiratory: No shortness of breath, dyspnea on exertion, cough, or hemoptysis    O: Physical Exam:  Pain to palpation glenohumeral articulation about anterior inferior glenohumeral ligament and posterolateral capsule.  CMS intact.    Images :  IMPRESSION: Mild glenohumeral and acromioclavicular joint degenerative change. There is no evidence of an acute fracture.     Chronic appearing area of calcification/mineralization and surgical clips in the right chest wall/axillary region. Atherosclerotic vascular  calcifications         A: Glenohumeral arthropathy R shoulder    P: Inject R shoulder after verbal and written consent, ethyl chloride/chloroprep.  Follow outcomes  See back 6-12 weeks      Large Joint Injection/Arthocentesis: R glenohumeral joint    Date/Time: 1/11/2023 9:59 AM  Performed by: Meri Pacheco MD  Authorized by: Meri Pacheco MD     Indications:  Pain  Needle Size:  25 G  Guidance: landmark guided    Location:  Shoulder      Site:  R glenohumeral joint  Medications:  1 mL bupivacaine 0.5 %; 2 mL lidocaine 1 %; 40 mg triamcinolone 40 MG/ML  Medications comment:  2mL Bupivacaine   Outcome:  Tolerated well, no immediate complications  Procedure discussed: discussed risks, benefits, and alternatives    Consent Given by:  Patient  Timeout: timeout called immediately prior to procedure    Prep: patient was prepped and draped in usual sterile fashion        I performed the injection.         In addition to the above assessment and plan each active problem on Mily's problem list was evaluated today. This included the questioning of Mily for any medication problems. We will continue the current treatment plan for these active problems except as noted.        Again, thank you for allowing me to participate in the care of your patient.        Sincerely,        DENYS SUAREZ MD

## 2023-01-12 ENCOUNTER — TELEPHONE (OUTPATIENT)
Dept: ORTHOPEDICS | Facility: CLINIC | Age: 73
End: 2023-01-12

## 2023-01-12 DIAGNOSIS — M25.511 ACUTE PAIN OF RIGHT SHOULDER: Primary | ICD-10-CM

## 2023-01-12 NOTE — TELEPHONE ENCOUNTER
Reason for call:  Patient was referred to PT by Dr. Almanzar. When she called to schedule appointment they told her there weren't any orders. She did make an appointment at [place] on 1/18/23 and requested the orders be placed prior to.    Cell number on file:    Telephone Information:   mobile [phone]

## 2023-01-12 NOTE — TELEPHONE ENCOUNTER
Physical Therapy order placed.   Patient was seen yesterday 1/11/23.   Office visit note not yet complete.     Closing encounter.     Kalee Hwang, ATC

## 2023-01-18 ENCOUNTER — THERAPY VISIT (OUTPATIENT)
Dept: PHYSICAL THERAPY | Facility: CLINIC | Age: 73
End: 2023-01-18
Payer: COMMERCIAL

## 2023-01-18 ENCOUNTER — TELEPHONE (OUTPATIENT)
Dept: LAB | Facility: CLINIC | Age: 73
End: 2023-01-18

## 2023-01-18 ENCOUNTER — TELEPHONE (OUTPATIENT)
Dept: ORTHOPEDICS | Facility: CLINIC | Age: 73
End: 2023-01-18

## 2023-01-18 ENCOUNTER — APPOINTMENT (OUTPATIENT)
Dept: LAB | Facility: CLINIC | Age: 73
End: 2023-01-18
Payer: COMMERCIAL

## 2023-01-18 DIAGNOSIS — M25.511 ACUTE PAIN OF RIGHT SHOULDER: ICD-10-CM

## 2023-01-18 DIAGNOSIS — M25.511 SHOULDER PAIN, RIGHT: Primary | ICD-10-CM

## 2023-01-18 PROCEDURE — 97161 PT EVAL LOW COMPLEX 20 MIN: CPT | Mod: GP | Performed by: PHYSICAL THERAPIST

## 2023-01-18 PROCEDURE — 97140 MANUAL THERAPY 1/> REGIONS: CPT | Mod: GP | Performed by: PHYSICAL THERAPIST

## 2023-01-18 PROCEDURE — 97110 THERAPEUTIC EXERCISES: CPT | Mod: GP | Performed by: PHYSICAL THERAPIST

## 2023-01-18 NOTE — PROGRESS NOTES
[facility]    OUTPATIENT Physical Therapy ORTHOPEDIC EVALUATION  PLAN OF TREATMENT FOR OUTPATIENT REHABILITATION  (COMPLETE FOR INITIAL CLAIMS ONLY)  Patient's Last Name, First Name, M.I.  YOB: 1950  iMly Hilliard    Provider s Name:  [facility]   Medical Record No.  [id number]   Start of Care Date:  01/18/23   Onset Date:   01/11/23 (MD order)   Treatment Diagnosis:  R shoulder pain (likely frozen shoulder) Medical Diagnosis:     Acute pain of right shoulder  Shoulder pain, right       Goals:     01/18/23 0500   Body Part   Goals listed below are for R shoulder   Goal #1   Goal #1 reaching   Previous Functional Level No restrictions;Could reach;behind back;overhead   Current Functional Level Can reach ;to shoulder level;Cannot reach ;behind back   Performance level AROM R Shldr Flx: 92, Ext/IR: L4   STG Target Performance Reach ;overhead   Performance level AROM R Shldr Flx: 125   Rationale for independent personal hygiene;for dressing;for bathing;for meal preparation;for safe driving, hook seat belt, reach shift lever and turn signal, using both hands on steering wheel   Due date 02/22/23   LTG Target Performance Reach;overhead;behind back   Performance Level AROM R Shldr > 145, Ext/IR > L1   Rationale for independent personal hygiene;for dressing;for bathing;for meal preparation;for safe driving, hook seat belt, reach shift lever and turn signal, using both hands on steering wheel   Due date 03/29/23         Therapy Frequency:  1x/wk  Predicted Duration of Therapy Intervention:  10 weeks    Roma Calderón, PT                 I CERTIFY THE NEED FOR THESE SERVICES FURNISHED UNDER        THIS PLAN OF TREATMENT AND WHILE UNDER MY CARE     (Physician attestation of this document indicates review and certification of the therapy plan).                      Certification Date From:  01/18/23   Certification Date To:  03/29/23    Referring Provider:  Phong Almanzar    Initial Assessment        See Epic Evaluation SOC Date: 01/18/23

## 2023-01-18 NOTE — TELEPHONE ENCOUNTER
[facility] Call Center    Phone Message    May a detailed message be left on voicemail: yes     Reason for Call: Other: Mily is calling because she had made her appointment for physical therapy nad for labs but no orders have been sent to either location. Please place those orders so she can schedule her labs and the therapist can see what type of therapy he kendal like [place]d[address] to rd. Also when orders have been placed for the lab please call her so she can make another appointment. Thank you     Action Taken: Other:  ortho    Travel Screening: Not Applicable

## 2023-01-18 NOTE — PROGRESS NOTES
Hi-    Patient has a lab appointment today but the labs are not signed. Please sign the pending lab orders. Thank you! JALYN Humphrey

## 2023-01-18 NOTE — PROGRESS NOTES
Physical Therapy Initial Evaluation  Subjective:  The history is provided by the patient. No  was used.   Patient Health History  Mily Hilliard being seen for R shoulder pain.     Date of Onset: MD 1/11/2023.   Problem occurred: unknown   Pain is reported as 5/10 on pain scale.  General health as reported by patient is good.  Pertinent medical history includes: cancer, high blood pressure, menopausal, numbness/tingling, osteoporosis and thyroid problems.        Surgeries include:  Cancer surgery. Other surgery history details: R masectomy and lymphnoid removal.    Current medications:  Anti-inflammatory, bone density, high blood pressure medication, muscle relaxants, pain medication and thyroid medication.       Primary job tasks include:  Other.   Other job/home tasks details: Retired.                Therapist Generated HPI Evaluation  Problem details: Reports chronic, intermittent R shoulder pain. States pain increases with certain arm positions in yoga, specifically overhead motions, has difficulty washing hair due to the pain. Pain also increases with lifting, reaching back and R sidelying. Received an injection last week which has already helped to decrease the pain, has also learned to modify dressing and other activities to avoid the pain.      R Shoulder Xray 12/28/2022 IMPRESSION: Mild glenohumeral and acromioclavicular joint degenerative change. There is no evidence of an acute fracture.     Chronic appearing area of calcification/mineralization and surgical clips in the right chest wall/axillary region. Atherosclerotic vascular calcifications..         Type of problem:  Right shoulder.    This is a chronic condition.  Condition occurred with:  Unknown cause.  Where condition occurred: for unknown reasons.  Patient reports pain:  In the joint.  Pain is described as sharp and is intermittent.  Pain is the same all the time.  Since onset symptoms are rapidly improving.  Associated  symptoms:  Loss of motion/stiffness. Symptoms are exacerbated by lifting, carrying, certain positions, using arm behind back, using arm overhead and lying on extremity  and relieved by rest, other and heat (Tylenol).                              Objective:  Standing Alignment:    Cervical/Thoracic:  Forward head and convex thoracic scoliosis R  Shoulder/UE:  Rounded shoulders  Lumbar:  Convex scoliosis R                Flexibility/Screens:     Upper Extremity:    Decreased left upper extremity flexibility at:  Pectoralis Major and Pectoralis Minor    Decreased right upper extremity flexibility present at:  Pectoralis Major and Pectoralis Minor                           Shoulder Evaluation:  ROM:  AROM:    Flexion:  Left:  150    Right:  92  Extension: Left: 70Right: 43  Abduction:  Left: 130   Right:  90 (into scaption)                Flexion/External Rotation:  Left:  T1    Right:  Lateral shoulder  Extension/Internal Rotation:  Left:  T12    Right:  L4    PROM:    Flexion:  Right: 121      Abduction:  Right:  86    Internal Rotation:  Right:  45  External Rotation:  Right:  35                    Strength:    Flexion: Left:5-/5   Pain:    Right: 4+/5     Pain:   Extension:  Left: 5/5    Pain:    Right: 5/5    Pain:  Abduction:  Left: 5-/5  Pain:    Right: 4+/5      Pain:+    Internal Rotation:  Left:5/5     Pain:    Right: 5-/5      Pain:+  External Rotation:   Left:5/5     Pain:   Right:5-/5     Pain:                  Mobility Tests:      Glenohumeral posterior right:  Hypomobile  Glenohumeral inferior right:  Hypomobile                                             General     ROS    Assessment/Plan:    Patient is a 72 year old female with right side shoulder complaints.    Patient has the following significant findings with corresponding treatment plan.                Diagnosis 1:  R shoulder pain  Pain -  hot/cold therapy, manual therapy, education, directional preference exercise and home program  Decreased  ROM/flexibility - manual therapy and therapeutic exercise  Decreased joint mobility - manual therapy and therapeutic exercise  Decreased strength - therapeutic exercise and therapeutic activities  Impaired muscle performance - neuro re-education  Decreased function - therapeutic activities  Impaired posture - neuro re-education    Therapy Evaluation Codes:   1) History comprised of:   Personal factors that impact the plan of care:      Age and Time since onset of symptoms.    Comorbidity factors that impact the plan of care are:      Cancer, High blood pressure, Menopausal, Numbness/tingling and osteoporosis, thyroid problems.     Medications impacting care: Anti-inflammatory, Bone density, High blood pressure, Muscle relaxant, Pain and thyroid med.  2) Examination of Body Systems comprised of:   Body structures and functions that impact the plan of care:      Shoulder.   Activity limitations that impact the plan of care are:      Bathing, Cooking, Dressing, Lifting, Sleeping and Laying down.  3) Clinical presentation characteristics are:   Stable/Uncomplicated.  4) Decision-Making    Low complexity using standardized patient assessment instrument and/or measureable assessment of functional outcome.  Cumulative Therapy Evaluation is: Low complexity.    Previous and current functional limitations:  (See Goal Flow Sheet for this information)    Short term and Long term goals: (See Goal Flow Sheet for this information)     Communication ability:  Patient appears to be able to clearly communicate and understand verbal and written communication and follow directions correctly.  Treatment Explanation - The following has been discussed with the patient:   RX ordered/plan of care  Anticipated outcomes  Possible risks and side effects  This patient would benefit from PT intervention to resume normal activities.   Rehab potential is good.    Frequency:  1 X week, once daily  Duration:  for 10 weeks  Discharge Plan:  Achieve  all LTG.  Independent in home treatment program.  Reach maximal therapeutic benefit.    Please refer to the daily flowsheet for treatment today, total treatment time and time spent performing 1:1 timed codes.

## 2023-01-18 NOTE — TELEPHONE ENCOUNTER
Patient was seen 1/11/23 by Dr. Almanzar. Please see request below for orders to be completed.     HELEN Webb RN

## 2023-01-20 NOTE — TELEPHONE ENCOUNTER
Pt LM that she is feeling much better after her shoulder inj & she started PT on 1/18. BP runs around 124/67 in the morning & 135/70 in the afternoon. She is going to a movie this afternoon, so unavailable today.    Will reach out to pt on Monday.     YANCI Whelan  Patient Advocate Liason (PAL)  [facility]  Ph. [phone] / Fax. [phone]

## 2023-01-20 NOTE — TELEPHONE ENCOUNTER
Orders have been signed by provider for both physical therapy and labs.     Phone call to patient and she was informed of the above.     HELEN Webb RN

## 2023-01-20 NOTE — TELEPHONE ENCOUNTER
Phone call to patient and apologized for the delay. Let her know that Dr. Almanzar is at our location one day per week and that a message has been sent to him to place orders. We will call her back as soon as they are placed.     Lab orders are still pending in office visit note from 1/11/23. . Please sign orders so patient may schedule her lab appointments.     HELEN Webb RN

## 2023-01-20 NOTE — TELEPHONE ENCOUNTER
Called pt at [phone] to get an update, not available, so LM to call me back at [phone]. Will await for her call back.     YANCI Whelan  Patient Advocate Liason (PAL)  [facility]  Ph. [phone] / Fax. [phone]

## 2023-01-22 RX ORDER — LIDOCAINE HYDROCHLORIDE 10 MG/ML
2 INJECTION, SOLUTION INFILTRATION; PERINEURAL
Status: SHIPPED | OUTPATIENT
Start: 2023-01-11

## 2023-01-22 RX ORDER — TRIAMCINOLONE ACETONIDE 40 MG/ML
40 INJECTION, SUSPENSION INTRA-ARTICULAR; INTRAMUSCULAR
Status: SHIPPED | OUTPATIENT
Start: 2023-01-11

## 2023-01-22 RX ORDER — BUPIVACAINE HYDROCHLORIDE 5 MG/ML
1 INJECTION, SOLUTION PERINEURAL
Status: SHIPPED | OUTPATIENT
Start: 2023-01-11

## 2023-01-23 ENCOUNTER — OFFICE VISIT (OUTPATIENT)
Dept: PHYSICAL MEDICINE AND REHAB | Facility: CLINIC | Age: 73
End: 2023-01-23
Attending: INTERNAL MEDICINE
Payer: COMMERCIAL

## 2023-01-23 VITALS
WEIGHT: 130 LBS | DIASTOLIC BLOOD PRESSURE: 65 MMHG | HEIGHT: 66 IN | SYSTOLIC BLOOD PRESSURE: 147 MMHG | BODY MASS INDEX: 20.89 KG/M2 | HEART RATE: 61 BPM

## 2023-01-23 DIAGNOSIS — M47.816 LUMBAR FACET ARTHROPATHY: ICD-10-CM

## 2023-01-23 DIAGNOSIS — M79.18 MYOFASCIAL PAIN: ICD-10-CM

## 2023-01-23 DIAGNOSIS — M41.126 ADOLESCENT IDIOPATHIC SCOLIOSIS OF LUMBAR REGION: ICD-10-CM

## 2023-01-23 DIAGNOSIS — G62.9 POLYNEUROPATHY: ICD-10-CM

## 2023-01-23 DIAGNOSIS — M53.3 SACROILIAC JOINT PAIN: ICD-10-CM

## 2023-01-23 DIAGNOSIS — M54.50 LUMBAR SPINE PAIN: Primary | ICD-10-CM

## 2023-01-23 DIAGNOSIS — M43.8X9 SAGITTAL PLANE IMBALANCE: ICD-10-CM

## 2023-01-23 PROCEDURE — 99204 OFFICE O/P NEW MOD 45 MIN: CPT | Performed by: PHYSICAL MEDICINE & REHABILITATION

## 2023-01-23 RX ORDER — GABAPENTIN 100 MG/1
CAPSULE ORAL
Qty: 90 CAPSULE | Refills: 1 | Status: SHIPPED | OUTPATIENT
Start: 2023-01-23 | End: 2023-04-06

## 2023-01-23 ASSESSMENT — PAIN SCALES - GENERAL: PAINLEVEL: SEVERE PAIN (7)

## 2023-01-23 NOTE — PATIENT INSTRUCTIONS
A physical therapy order was provided for you today.  You will be contacted by physical therapy.  If nobody contacts you within 3 to 5 days, please contact the clinic at [phone].  It will be very important for you to do your physical therapy exercises on a regular basis to decrease your pain and prevent future pain flares.     Prescribed Gabapentin today, 100 mg tablets, to be titrated up to 3 tablets at bedtime as tolerated for your nerve pain. Please follow Gabapentin dosing chart below.     Gabapentin 100mg Dosing Chart    DATE  MORNING AFTERNOON BEDTIME    Day 1 0 0 1    Day 2 0 0 1    Day 3 0 0 1    Day 4 0 0 2    Day 5 0 0 2    Day 6 0 0 2    Day 7 0 0 3    Day 8 0 0 3    Day 9 0 0 3                                                                                                                                    Continue medication, taking 3 capsules at bedtime    Please call if you have any questions regarding how to take your medication  Clinic Phone # [phone]

## 2023-01-23 NOTE — LETTER
1/23/2023         RE: Mily Hilliard  [address]  [place] [address]        Dear Colleague,    Thank you for referring your patient, Mily Hilliard, to the [facility]. Please see a copy of my visit note below.    Assessment/Plan:      Mily was seen today for back pain.    Diagnoses and all orders for this visit:    Lumbar spine pain  -     Physical Therapy Referral; Future  -     gabapentin (NEURONTIN) 100 MG capsule; 100mg at bedtime x 3 days, then may increase to 200mg x 3 days, then may increase to 300mg at bedtime    Lumbar facet arthropathy  -     Physical Therapy Referral; Future    Sagittal plane imbalance  -     Physical Therapy Referral; Future    Adolescent idiopathic scoliosis of lumbar region  -     Physical Therapy Referral; Future    Myofascial pain  -     Physical Therapy Referral; Future    Sacroiliac joint pain  -     Physical Therapy Referral; Future    Polyneuropathy  -     gabapentin (NEURONTIN) 100 MG capsule; 100mg at bedtime x 3 days, then may increase to 200mg x 3 days, then may increase to 300mg at bedtime    Other orders  -     Spine  Referral         Assessment: Pleasant 72 year old female Hypothyroidism,with a history of hypertension, with a history of hypertension, hypothyroidism, breast cancer treated with chemotherapy and subsequent polyneuropathy, hyperlipidemia, osteoporosis with:    1.  Chronic thoracolumbar spine pain right low back into the gluteal region.  Likely multifactorial related to right-sided lumbar scoliosis, facet arthropathy, sacroiliac joint pain in setting of sagittal plane imbalance and flatback syndrome.  She has some occasional right anterior knee pain may be related to severe right foraminal stenosis L4-5 but no radicular pain today.    2.  Bilateral lower extremity paresthesias consistent with peripheral polyneuropathy.    Discussion:    1. *I discussed the diagnosis and treatment options.  We discussed  the option of physical therapy along with further imaging and injections.  Reassurance provided that I do not appreciate any acute lumbar compression fracture.  She has degenerative disc disease and facet arthropathy with severe right foraminal stenosis at L4-5 which may also contribute to her symptoms.    2.  Start physical therapy for lumbar core strengthening stabilization.    3.  Trial gabapentin 100 mg at bedtime increasing to 300 mg at bedtime.    4.  Can consider scoliosis imaging along with facet injections or SI joint injections in the future.    5.  Follow-up 4 to 6 weeks.      It was our pleasure caring for your patient today, if there any questions or concerns please do not hesitate to contact us.      Subjective:   Patient ID: Mily Hilliard is a 72 year old female.    History of Present Illness:Patient presents today at the request of Dr. Pacheco for an evaluation of lumbar spine pain.  She has several year history of lumbar spine pain increased over the past few years.  There is no specific injury.  This is constant but waxes and wanes in intensity.  Starts over the mid thoracic spine on the right to the thoracolumbar junction rating down to the right gluteal region.  Typically worse with any prolonged sitting or standing.  Some right gluteal pain with seated pigeon pose in chair yoga however have stopped doing that after some recent diagnostics.  She did better with walking/movement or heat.  She has numbness and tingling in the feet up to the knees from chemotherapy but no radiation/paresthesias down the leg today but has some numbness and tingling into the right knee at times.  Has been seeing a chiropractor in the past.  Pain is a 10/10 or 7/10 today 5/10 at best.  Had bone density testing and is on Fosamax.  Question of a compression fracture at L1 and was referred here.      Imaging: MRI report and images were personally reviewed and discussed with the patient.  A plastic model was utilized  "during the discussion.  MRI of theLumbar spine personally reviewed.  Also reviewed plain films of the lumbar spine.  MRI lumbar spine reveals multilevel grade 1 spondylolisthesis/retrolisthesis with flattened lumbar lordotic curve, scoliotic curve is present.  Mild disc height loss multiple levels of severe disc height loss L3-4.  Moderate facet arthropathy L5-S1 mild facet arthropathy L4-5 with severe right foraminal stenosis.  Mild facet arthropathy L3-4 L2-3 L1-2 with no high-grade central canal or foraminal stenosis at other levels.  I do not appreciate any L1 compression fracture acute or chronic.    Lumbar plain films reveal the right lumbar scoliotic curve with right lateral listhesis of L3 and L4 L4-L5.       Review of Systems: Complains of joint pain, muscle pain, \"sciatica \".,  Itching.  Denies fever, weight loss, waking, headache, change in vision, chest pain, shortness of breath, abdominal pain, nausea, vomiting, bowel or bladder incontinence, balance issues, sleep issues.  Remainder of 12 point review systems negative unless listed above.    Past Medical History:   Diagnosis Date     Arthritis      Breast cancer (H)     right breast     Hypercholesterolemia      Hypertension      Hypothyroidism        Family History   Problem Relation Age of Onset     Cancer - colorectal Mother         at age 94     Breast Cancer Mother      Diabetes Sister         pre-diabetic     Breast Cancer Sister      Macular Degeneration Father      Bladder Cancer Father      Stomach Cancer Maternal Grandfather      Diabetes Sister      Breast Cancer Sister          Social History     Socioeconomic History     Marital status:      Spouse name: None     Number of children: None     Years of education: None     Highest education level: None   Tobacco Use     Smoking status: Former     Packs/day: 0.00     Years: 0.00     Pack years: 0.00     Types: Cigarettes     Quit date: 2003     Years since quittin.0     " Smokeless tobacco: Never   Substance and Sexual Activity     Alcohol use: Yes     Alcohol/week: 11.7 standard drinks     Comment: occ     Drug use: No     Sexual activity: Not Currently     Partners: Male     Birth control/protection: None   Other Topics Concern     Parent/sibling w/ CABG, MI or angioplasty before 65F 55M? No     Social Determinants of Health     Financial Resource Strain: Low Risk      Difficulty of Paying Living Expenses: Not hard at all   Food Insecurity: No Food Insecurity     Worried About Running Out of Food in the Last Year: Never true     Ran Out of Food in the Last Year: Never true   Transportation Needs: No Transportation Needs     Lack of Transportation (Medical): No     Lack of Transportation (Non-Medical): No   Social Connections: Socially Integrated     Frequency of Communication with Friends and Family: More than three times a week     Frequency of Social Gatherings with Friends and Family: Twice a week     Attends Mosque Services: More than 4 times per year     Active Member of Clubs or Organizations: Yes     Marital Status:    Housing Stability: Unknown     Unable to Pay for Housing in the Last Year: No     Unstable Housing in the Last Year: No     Social history:  over 50 years.  No tobacco or alcohol.  Retired.    The following portions of the patient's history were reviewed and updated as appropriate: allergies, current medications, past family history, past medical history, past social history, past surgical history and problem list.    Oswestry (YENI) Questionnaire    OSWESTRY DISABILITY INDEX 1/23/2023   Count 10   Sum 13   Oswestry Score (%) 26   Some recent data might be hidden       Neck Disability Index:  No flowsheet data found.       PHQ-2 Score:     PHQ-2 ( 1999 [employer]) 2/6/2022 10/26/2020   Q1: Little interest or pleasure in doing things 0 0   Q2: Feeling down, depressed or hopeless 0 1   PHQ-2 Score 0 1   PHQ-2 Total Score (12-17 Years)- Positive if 3  "or more points; Administer PHQ-A if positive - 1   Q1: Little interest or pleasure in doing things Not at all Not at all   Q2: Feeling down, depressed or hopeless Not at all Several days   PHQ-2 Score 0 1                  Objective:   Physical Exam:    BP (!) 147/65   Pulse 61   Ht 5' 6\" (1.676 m)   Wt 130 lb (59 kg)   BMI 20.98 kg/m    Body mass index is 20.98 kg/m .      General:  Well-appearing female in no acute distress.  Pleasant, cooperative, and interactive throughout the examination and interview.  CV: No lower extremity edema on inspection or paltation.  Lymphatics: No cervical lymphadenopathy palpated. Eyes: sclera clear. Skin: No rashes or lesions seen over the head/neck, hairline, arms, legs, trunk.  Respirations unlabored.  MSK: Gait is nonantalgic, cautious.  Able to heel-toe stand without difficulty.  Negative Romberg.  Spine: Flattened lumbar lordotic curve, right lumbar scoliotic curve.  Mildly reduced lumbar flexion finger to floor testing.  Palpation: Tenderness to palpation over right thoracolumbar paraspinals into the right gluteal region over the right SI joint.  Extremities: Full range of motion of the elbows, and wrists with no effusions or tenderness to palpation.   Full range of motion of the hips, knees, and ankles from seated with no effusions or tenderness to palpation.    No hypermobility of the upper or lower extremities.  Neurologic exam: Mental status: Patient is alert and oriented with normal affect.  Attention, knowledge, memory, and language are intact.  Normal coordination throughout the examination.  Reflexes are 1+ and symmetric biceps, triceps, brachioradialis, 0 patellar, and 0 Achilles with equivocal toes and negative Félix's.  Sensation is decreased to light touch bilateral feet up to the knees.  Intact to light touch throughout the upper  extremities bilaterally.  Manual muscle testing reveals 5 out of 5 in the hip flexors, knee flexors/extensors, ankle plantar " flexors, ankle  dorsiflexors, and EHL.  Upper extremities: Grossly normal strength . Normal muscle bulk and tone in the arms and legs.    Negative seated   straight leg raise bilaterally.            Again, thank you for allowing me to participate in the care of your patient.        Sincerely,        Serafin Mccain, DO

## 2023-01-23 NOTE — PROGRESS NOTES
Assessment/Plan:      Mily was seen today for back pain.    Diagnoses and all orders for this visit:    Lumbar spine pain  -     Physical Therapy Referral; Future  -     gabapentin (NEURONTIN) 100 MG capsule; 100mg at bedtime x 3 days, then may increase to 200mg x 3 days, then may increase to 300mg at bedtime    Lumbar facet arthropathy  -     Physical Therapy Referral; Future    Sagittal plane imbalance  -     Physical Therapy Referral; Future    Adolescent idiopathic scoliosis of lumbar region  -     Physical Therapy Referral; Future    Myofascial pain  -     Physical Therapy Referral; Future    Sacroiliac joint pain  -     Physical Therapy Referral; Future    Polyneuropathy  -     gabapentin (NEURONTIN) 100 MG capsule; 100mg at bedtime x 3 days, then may increase to 200mg x 3 days, then may increase to 300mg at bedtime    Other orders  -     Spine  Referral         Assessment: Pleasant 72 year old female Hypothyroidism,with a history of hypertension, with a history of hypertension, hypothyroidism, breast cancer treated with chemotherapy and subsequent polyneuropathy, hyperlipidemia, osteoporosis with:    1.  Chronic thoracolumbar spine pain right low back into the gluteal region.  Likely multifactorial related to right-sided lumbar scoliosis, facet arthropathy, sacroiliac joint pain in setting of sagittal plane imbalance and flatback syndrome.  She has some occasional right anterior knee pain may be related to severe right foraminal stenosis L4-5 but no radicular pain today.    2.  Bilateral lower extremity paresthesias consistent with peripheral polyneuropathy.    Discussion:    1. *I discussed the diagnosis and treatment options.  We discussed the option of physical therapy along with further imaging and injections.  Reassurance provided that I do not appreciate any acute lumbar compression fracture.  She has degenerative disc disease and facet arthropathy with severe right foraminal stenosis at  L4-5 which may also contribute to her symptoms.    2.  Start physical therapy for lumbar core strengthening stabilization.    3.  Trial gabapentin 100 mg at bedtime increasing to 300 mg at bedtime.    4.  Can consider scoliosis imaging along with facet injections or SI joint injections in the future.    5.  Follow-up 4 to 6 weeks.      It was our pleasure caring for your patient today, if there any questions or concerns please do not hesitate to contact us.      Subjective:   Patient ID: Mily Hilliard is a 72 year old female.    History of Present Illness:Patient presents today at the request of Dr. Pacheco for an evaluation of lumbar spine pain.  She has several year history of lumbar spine pain increased over the past few years.  There is no specific injury.  This is constant but waxes and wanes in intensity.  Starts over the mid thoracic spine on the right to the thoracolumbar junction rating down to the right gluteal region.  Typically worse with any prolonged sitting or standing.  Some right gluteal pain with seated pigeon pose in chair yoga however have stopped doing that after some recent diagnostics.  She did better with walking/movement or heat.  She has numbness and tingling in the feet up to the knees from chemotherapy but no radiation/paresthesias down the leg today but has some numbness and tingling into the right knee at times.  Has been seeing a chiropractor in the past.  Pain is a 10/10 or 7/10 today 5/10 at best.  Had bone density testing and is on Fosamax.  Question of a compression fracture at L1 and was referred here.      Imaging: MRI report and images were personally reviewed and discussed with the patient.  A plastic model was utilized during the discussion.  MRI of theLumbar spine personally reviewed.  Also reviewed plain films of the lumbar spine.  MRI lumbar spine reveals multilevel grade 1 spondylolisthesis/retrolisthesis with flattened lumbar lordotic curve, scoliotic curve is  "present.  Mild disc height loss multiple levels of severe disc height loss L3-4.  Moderate facet arthropathy L5-S1 mild facet arthropathy L4-5 with severe right foraminal stenosis.  Mild facet arthropathy L3-4 L2-3 L1-2 with no high-grade central canal or foraminal stenosis at other levels.  I do not appreciate any L1 compression fracture acute or chronic.    Lumbar plain films reveal the right lumbar scoliotic curve with right lateral listhesis of L3 and L4 L4-L5.       Review of Systems: Complains of joint pain, muscle pain, \"sciatica \".,  Itching.  Denies fever, weight loss, waking, headache, change in vision, chest pain, shortness of breath, abdominal pain, nausea, vomiting, bowel or bladder incontinence, balance issues, sleep issues.  Remainder of 12 point review systems negative unless listed above.    Past Medical History:   Diagnosis Date     Arthritis      Breast cancer (H)     right breast     Hypercholesterolemia      Hypertension      Hypothyroidism        Family History   Problem Relation Age of Onset     Cancer - colorectal Mother         at age 94     Breast Cancer Mother      Diabetes Sister         pre-diabetic     Breast Cancer Sister      Macular Degeneration Father      Bladder Cancer Father      Stomach Cancer Maternal Grandfather      Diabetes Sister      Breast Cancer Sister          Social History     Socioeconomic History     Marital status:      Spouse name: None     Number of children: None     Years of education: None     Highest education level: None   Tobacco Use     Smoking status: Former     Packs/day: 0.00     Years: 0.00     Pack years: 0.00     Types: Cigarettes     Quit date: 2003     Years since quittin.0     Smokeless tobacco: Never   Substance and Sexual Activity     Alcohol use: Yes     Alcohol/week: 11.7 standard drinks     Comment: occ     Drug use: No     Sexual activity: Not Currently     Partners: Male     Birth control/protection: None   Other Topics " Concern     Parent/sibling w/ CABG, MI or angioplasty before 65F 55M? No     Social Determinants of Health     Financial Resource Strain: Low Risk      Difficulty of Paying Living Expenses: Not hard at all   Food Insecurity: No Food Insecurity     Worried About Running Out of Food in the Last Year: Never true     Ran Out of Food in the Last Year: Never true   Transportation Needs: No Transportation Needs     Lack of Transportation (Medical): No     Lack of Transportation (Non-Medical): No   Social Connections: Socially Integrated     Frequency of Communication with Friends and Family: More than three times a week     Frequency of Social Gatherings with Friends and Family: Twice a week     Attends Taoist Services: More than 4 times per year     Active Member of Clubs or Organizations: Yes     Marital Status:    Housing Stability: Unknown     Unable to Pay for Housing in the Last Year: No     Unstable Housing in the Last Year: No     Social history:  over 50 years.  No tobacco or alcohol.  Retired.    The following portions of the patient's history were reviewed and updated as appropriate: allergies, current medications, past family history, past medical history, past social history, past surgical history and problem list.    Oswestry (YENI) Questionnaire    OSWESTRY DISABILITY INDEX 1/23/2023   Count 10   Sum 13   Oswestry Score (%) 26   Some recent data might be hidden       Neck Disability Index:  No flowsheet data found.       PHQ-2 Score:     PHQ-2 ( 1999 [employer]) 2/6/2022 10/26/2020   Q1: Little interest or pleasure in doing things 0 0   Q2: Feeling down, depressed or hopeless 0 1   PHQ-2 Score 0 1   PHQ-2 Total Score (12-17 Years)- Positive if 3 or more points; Administer PHQ-A if positive - 1   Q1: Little interest or pleasure in doing things Not at all Not at all   Q2: Feeling down, depressed or hopeless Not at all Several days   PHQ-2 Score 0 1                  Objective:   Physical Exam:    BP  "(!) 147/65   Pulse 61   Ht 5' 6\" (1.676 m)   Wt 130 lb (59 kg)   BMI 20.98 kg/m    Body mass index is 20.98 kg/m .      General:  Well-appearing female in no acute distress.  Pleasant, cooperative, and interactive throughout the examination and interview.  CV: No lower extremity edema on inspection or paltation.  Lymphatics: No cervical lymphadenopathy palpated. Eyes: sclera clear. Skin: No rashes or lesions seen over the head/neck, hairline, arms, legs, trunk.  Respirations unlabored.  MSK: Gait is nonantalgic, cautious.  Able to heel-toe stand without difficulty.  Negative Romberg.  Spine: Flattened lumbar lordotic curve, right lumbar scoliotic curve.  Mildly reduced lumbar flexion finger to floor testing.  Palpation: Tenderness to palpation over right thoracolumbar paraspinals into the right gluteal region over the right SI joint.  Extremities: Full range of motion of the elbows, and wrists with no effusions or tenderness to palpation.   Full range of motion of the hips, knees, and ankles from seated with no effusions or tenderness to palpation.    No hypermobility of the upper or lower extremities.  Neurologic exam: Mental status: Patient is alert and oriented with normal affect.  Attention, knowledge, memory, and language are intact.  Normal coordination throughout the examination.  Reflexes are 1+ and symmetric biceps, triceps, brachioradialis, 0 patellar, and 0 Achilles with equivocal toes and negative Félix's.  Sensation is decreased to light touch bilateral feet up to the knees.  Intact to light touch throughout the upper  extremities bilaterally.  Manual muscle testing reveals 5 out of 5 in the hip flexors, knee flexors/extensors, ankle plantar flexors, ankle  dorsiflexors, and EHL.  Upper extremities: Grossly normal strength . Normal muscle bulk and tone in the arms and legs.    Negative seated   straight leg raise bilaterally.        "

## 2023-01-25 ENCOUNTER — THERAPY VISIT (OUTPATIENT)
Dept: PHYSICAL THERAPY | Facility: CLINIC | Age: 73
End: 2023-01-25
Payer: COMMERCIAL

## 2023-01-25 DIAGNOSIS — M25.511 SHOULDER PAIN, RIGHT: Primary | ICD-10-CM

## 2023-01-25 PROCEDURE — 97110 THERAPEUTIC EXERCISES: CPT | Mod: GP | Performed by: PHYSICAL THERAPIST

## 2023-01-25 PROCEDURE — 97140 MANUAL THERAPY 1/> REGIONS: CPT | Mod: GP | Performed by: PHYSICAL THERAPIST

## 2023-01-25 NOTE — TELEPHONE ENCOUNTER
Update on blood pressure readings and back pain-  1. I spoke with patient and she reports that she has stopped using Ibuprofen.   She now uses Tylenol for joint pain when needed.  She reports blood pressure readings average 120s/60s-135/78.  If she gets a higher reading, this will occur in the evening.    The high reading she reported was 140/68.  Continues to take Amlodipine 5 mg daily and Lisinopril 40 mg daily.  2. Patient [facility] appointment [facility] [place], and they are making arrangements for her to have Physical therapy for her back.   She is also having Physical therapy for her shoulder.  Patient doesn't have any concerns at this time, and overall feels that she is doing well.  No further questions.  Will call back if any other questions or concerns.  Forwarded to provider for review.  JAMES Lane RN

## 2023-01-25 NOTE — TELEPHONE ENCOUNTER
This looks great, thank you! I would recommend no additional changes to her medications at this time.    Meri Pacheco MD  Internal Medicine-Pediatrics

## 2023-01-27 ENCOUNTER — LAB (OUTPATIENT)
Dept: LAB | Facility: CLINIC | Age: 73
End: 2023-01-27
Payer: COMMERCIAL

## 2023-01-27 DIAGNOSIS — M89.9 DISORDER OF BONE, UNSPECIFIED: ICD-10-CM

## 2023-01-27 DIAGNOSIS — M25.511 ACUTE PAIN OF RIGHT SHOULDER: ICD-10-CM

## 2023-01-27 DIAGNOSIS — M75.50 BURSITIS OF SHOULDER, UNSPECIFIED LATERALITY: ICD-10-CM

## 2023-01-27 LAB
CRP SERPL-MCNC: <3 MG/L
D DIMER PPP FEU-MCNC: 3.6 UG/ML FEU (ref 0–0.5)
ERYTHROCYTE [SEDIMENTATION RATE] IN BLOOD BY WESTERGREN METHOD: 9 MM/HR (ref 0–30)
URATE SERPL-MCNC: 4.3 MG/DL (ref 2.4–5.7)

## 2023-01-27 PROCEDURE — 36415 COLL VENOUS BLD VENIPUNCTURE: CPT

## 2023-01-27 PROCEDURE — 86140 C-REACTIVE PROTEIN: CPT

## 2023-01-27 PROCEDURE — 85652 RBC SED RATE AUTOMATED: CPT

## 2023-01-27 PROCEDURE — 86200 CCP ANTIBODY: CPT

## 2023-01-27 PROCEDURE — 86431 RHEUMATOID FACTOR QUANT: CPT

## 2023-01-27 PROCEDURE — 85379 FIBRIN DEGRADATION QUANT: CPT

## 2023-01-27 PROCEDURE — 84550 ASSAY OF BLOOD/URIC ACID: CPT

## 2023-01-27 PROCEDURE — 86038 ANTINUCLEAR ANTIBODIES: CPT

## 2023-01-27 PROCEDURE — 82306 VITAMIN D 25 HYDROXY: CPT

## 2023-01-27 PROCEDURE — 86225 DNA ANTIBODY NATIVE: CPT

## 2023-01-30 LAB
ANA SER QL IF: NEGATIVE
DEPRECATED CALCIDIOL+CALCIFEROL SERPL-MC: 51 UG/L (ref 20–75)
RHEUMATOID FACT SER NEPH-ACNC: <7 IU/ML

## 2023-02-01 LAB
CCP AB SER IA-ACNC: 1.2 U/ML
DSDNA AB SER-ACNC: 0.7 IU/ML

## 2023-02-03 ENCOUNTER — THERAPY VISIT (OUTPATIENT)
Dept: PHYSICAL THERAPY | Facility: CLINIC | Age: 73
End: 2023-02-03
Payer: COMMERCIAL

## 2023-02-03 DIAGNOSIS — M25.511 SHOULDER PAIN, RIGHT: Primary | ICD-10-CM

## 2023-02-03 DIAGNOSIS — M54.50 CHRONIC RIGHT-SIDED LOW BACK PAIN: ICD-10-CM

## 2023-02-03 DIAGNOSIS — G89.29 CHRONIC RIGHT-SIDED LOW BACK PAIN: ICD-10-CM

## 2023-02-03 DIAGNOSIS — E78.00 PURE HYPERCHOLESTEROLEMIA: ICD-10-CM

## 2023-02-03 PROCEDURE — 97110 THERAPEUTIC EXERCISES: CPT | Mod: GP | Performed by: PHYSICAL THERAPIST

## 2023-02-03 PROCEDURE — 97161 PT EVAL LOW COMPLEX 20 MIN: CPT | Mod: GP | Performed by: PHYSICAL THERAPIST

## 2023-02-03 RX ORDER — ATORVASTATIN CALCIUM 20 MG/1
TABLET, FILM COATED ORAL
Qty: 90 TABLET | Refills: 0 | Status: SHIPPED | OUTPATIENT
Start: 2023-02-03 | End: 2023-02-15

## 2023-02-03 NOTE — PROGRESS NOTES
Physical Therapy Initial Evaluation  Subjective:  The history is provided by the patient. No  was used.   Therapist Generated HPI Evaluation  Problem details: Patient complains of chronic R sided low back pain that travels to her anterior R knee. Had previously worked with a chiropractor, which has been helpful. States prolonged sitting or standing greater then 1 hr increases the pain. Bending, lifting and twisting also cause pain. Walks 1 mile at least 3 times weekly, occasionally the R leg will buckle d/t the knee, back pain prevents her from walking further. Pain improves with heat. Back feels the best 1st thing in the morning, sleeps supine, unable to sleep on her side due to pain.    Lumbar MRI 1/10/2023 FINDINGS: Alignment is significant for thoracolumbar scoliosis and  multilevel grade 1 spondylolisthesis. Bone marrow demonstrates  scattered mixed degenerative endplate changes. Conus medullaris is  unremarkable terminating at the level of the L1-L2 disc. Cauda equina  is unremarkable. Bilateral sacroiliac joint degenerative change. Small  areas of nonspecific T2 hyperintense signal within the bilateral  kidneys and liver which are incompletely characterized but  statistically likely benign.     Segmental Analysis:      T12-L1: Mild disc height loss. Disc bulge with central  protrusion/annular fissure. Mild bilateral facet arthropathy. No  foraminal or spinal canal stenosis.      L1-L2: Mild disc height loss. Disc bulge. Mild bilateral facet  arthropathy. No foraminal or spinal canal stenosis.       L2-L3: Mild disc height loss. Disc bulge with annular fissure. Mild  bilateral facet arthropathy. No foraminal or spinal canal stenosis.  Slight asymmetric left lateral recess related to focal left central  bulge.       L3-L4: Severe disc height loss. Disc bulge. Mild bilateral facet  arthropathy. Mild bilateral foraminal stenosis. Mild, if any, spinal  canal stenosis.       L4-L5: Mild disc  loss. Disc bulge, asymmetric to the right. Mild  bilateral facet arthropathy. Severe right foraminal stenosis. No left  foraminal stenosis. No spinal canal stenosis.       L5-S1: Mild disc height loss. Disc bulge. Moderate bilateral facet  arthropathy. No foraminal or spinal canal stenosis.                                                                      IMPRESSION:    1. Multilevel degenerative change as detailed.  2. Foraminal stenosis is severe on the right at L4-L5..         Type of problem:  Lumbar.    This is a chronic condition.  Condition occurred with:  Degenerative joint disease.  Where condition occurred: for unknown reasons.  Patient reports pain:  Thoracic spine right and lumbar spine right.  Pain is described as cramping and aching and is constant.  Pain radiates to:  Knee right. Pain is the same all the time.  Since onset symptoms are gradually worsening.  Associated symptoms:  Loss of motion/stiffness. Symptoms are exacerbated by bending, lifting, sitting, twisting, standing and certain positions (sidelying)  and relieved by muscle relaxants, heat and analgesics.  Special tests included:  MRI.        Patient Health History  Mily A Arminda being seen for back.     Problem began: 1/23/2023 (md order).   Problem occurred: unknown   Pain is reported as 5/10 ( can increase to 10/10) on pain scale.  General health as reported by patient is good.  Pertinent medical history includes: numbness/tingling, cancer, high blood pressure, osteoporosis, menopausal and thyroid problems.   Red flags:  None as reported by patient.  Medical allergies: none.   Surgeries include:  Other. Other surgery history details:  R masectomy and lymphnoid removal.  .    Current medications:  Anti-inflammatory, bone density, high blood pressure medication, muscle relaxants, pain medication and thyroid medication.    Current occupation is retired.                                       Objective:  Standing Alignment:     Cervical/Thoracic:  Forward head and convex thoracic scoliosis R (lower thoracic/upper lumbar kyphosis)  Shoulder/UE:  Rounded shoulders  Lumbar:  Sway back and convex scoliosis R                Flexibility/Screens:       Lower Extremity:  Decreased left lower extremity flexibility:Hamstrings    Decreased right lower extremity flexibility:  Hamstrings               Lumbar/SI Evaluation  ROM:  Arom wnl lumbar: LETITIA: improves ROM, patient reports relief.  AROM Lumbar:   Flexion:        Knees R sided low back  Ext:                    25% no pain   Side Bend:        Left:     Right:   Rotation:           Left:     Right:   Side Glide:        Left:     Right:         Strength: TrA Contraction: fair (then back spasm),  Lumbar Myotomes:    T12-L3 (Hip Flex):  Left: 4+    Right: 4  L2-4 (Quads):  Left:  5    Right:  5  L4 (Ankle DF):  Left:  5    Right:  5  L5 (Great Toe Ext): Left: 5    Right: 5   S1 (Toe Raise):  Left: 5    Right: 5        Neural Tension/Mobility:      Left side:SLR w/DF  negative.   Right side:   SLR w/DF positive.  Lumbar Palpation:      Tenderness present at Right: Quadratus Lumborum; Erector Spinae and PSIS                                          Hip Evaluation    Hip Strength:        Abduction:  Left: 4+/5     Pain:Right: 4/5    Pain:                                 General     ROS    Assessment/Plan:    Patient is a 72 year old female with thoracic and lumbar complaints.    Patient has the following significant findings with corresponding treatment plan.                Diagnosis 1:  R sided low back pain  Pain -  hot/cold therapy, manual therapy, education, directional preference exercise and home program  Decreased ROM/flexibility - manual therapy and therapeutic exercise  Decreased strength - therapeutic exercise and therapeutic activities  Impaired muscle performance - neuro re-education  Decreased function - therapeutic activities  Impaired posture - neuro re-education    Therapy Evaluation  Codes:   1) History comprised of:   Personal factors that impact the plan of care:      Time since onset of symptoms.    Comorbidity factors that impact the plan of care are:      Cancer, High blood pressure, Menopausal, Numbness/tingling and osteoporosis, thyroid problems.     Medications impacting care: Anti-inflammatory, Bone density, High blood pressure, Muscle relaxant, Pain and thyroid med.  2) Examination of Body Systems comprised of:   Body structures and functions that impact the plan of care:      Lumbar spine and Thoracic Spine.   Activity limitations that impact the plan of care are:      Bending, Lifting, Sitting, Standing and Walking.  3) Clinical presentation characteristics are:   Stable/Uncomplicated.  4) Decision-Making    Low complexity using standardized patient assessment instrument and/or measureable assessment of functional outcome.  Cumulative Therapy Evaluation is: Low complexity.    Previous and current functional limitations:  (See Goal Flow Sheet for this information)    Short term and Long term goals: (See Goal Flow Sheet for this information)     Communication ability:  Patient appears to be able to clearly communicate and understand verbal and written communication and follow directions correctly.  Treatment Explanation - The following has been discussed with the patient:   RX ordered/plan of care  Anticipated outcomes  Possible risks and side effects  This patient would benefit from PT intervention to resume normal activities.   Rehab potential is good.    Frequency:  1 X week, once daily  Duration:  for 8 weeks  Discharge Plan:  Achieve all LTG.  Independent in home treatment program.  Reach maximal therapeutic benefit.    Please refer to the daily flowsheet for treatment today, total treatment time and time spent performing 1:1 timed codes.

## 2023-02-03 NOTE — PROGRESS NOTES
[facility]    OUTPATIENT Physical Therapy ORTHOPEDIC EVALUATION  PLAN OF TREATMENT FOR OUTPATIENT REHABILITATION  (COMPLETE FOR INITIAL CLAIMS ONLY)  Patient's Last Name, First Name, M.I.  YOB: 1950  Mily Hilliard    Provider s Name:  [facility]   Medical Record No.  [id number]   Start of Care Date:  02/03/23   Onset Date:   01/23/23 (MD order)   Treatment Diagnosis:    Medical Diagnosis:  Shoulder pain, right       Goals:     02/03/23 0500   Body Part   Goals listed below are for back   Goal #2   Goal #2 sitting   Previous Functional Level No restrictions   Current Functional Level Minutes patient can sit   Performance level 60 max   STG Target Performance Minutes patient will be able to sit   Performance level 75   Rationale for personal hygiene;to allow rest from standing;for community transportation   Due date 03/03/23   LTG Target Performance Minutes patient will be able to sit   Performance Level 90   Rationale for personal hygiene;to allow rest from standing;for community transportation   Due date 03/31/23         Therapy Frequency:  1x/wk  Predicted Duration of Therapy Intervention:  8 weeks    Roma Calderón, PT                 I CERTIFY THE NEED FOR THESE SERVICES FURNISHED UNDER        THIS PLAN OF TREATMENT AND WHILE UNDER MY CARE     (Physician attestation of this document indicates review and certification of the therapy plan).                     Certification Date From:  02/03/23   Certification Date To:  03/29/23    Referring Provider:  Serafin Mccain    Initial Assessment        See Epic Evaluation SOC Date: 02/03/23

## 2023-02-08 ENCOUNTER — THERAPY VISIT (OUTPATIENT)
Dept: PHYSICAL THERAPY | Facility: CLINIC | Age: 73
End: 2023-02-08
Payer: COMMERCIAL

## 2023-02-08 DIAGNOSIS — M25.511 SHOULDER PAIN, RIGHT: ICD-10-CM

## 2023-02-08 DIAGNOSIS — M54.50 CHRONIC RIGHT-SIDED LOW BACK PAIN: Primary | ICD-10-CM

## 2023-02-08 DIAGNOSIS — G89.29 CHRONIC RIGHT-SIDED LOW BACK PAIN: Primary | ICD-10-CM

## 2023-02-08 PROCEDURE — 97112 NEUROMUSCULAR REEDUCATION: CPT | Mod: GP | Performed by: PHYSICAL THERAPIST

## 2023-02-08 PROCEDURE — 97110 THERAPEUTIC EXERCISES: CPT | Mod: GP | Performed by: PHYSICAL THERAPIST

## 2023-02-08 PROCEDURE — 97140 MANUAL THERAPY 1/> REGIONS: CPT | Mod: GP | Performed by: PHYSICAL THERAPIST

## 2023-02-11 SDOH — HEALTH STABILITY: PHYSICAL HEALTH: ON AVERAGE, HOW MANY DAYS PER WEEK DO YOU ENGAGE IN MODERATE TO STRENUOUS EXERCISE (LIKE A BRISK WALK)?: 3 DAYS

## 2023-02-11 SDOH — ECONOMIC STABILITY: INCOME INSECURITY: IN THE LAST 12 MONTHS, WAS THERE A TIME WHEN YOU WERE NOT ABLE TO PAY THE MORTGAGE OR RENT ON TIME?: NO

## 2023-02-11 SDOH — ECONOMIC STABILITY: FOOD INSECURITY: WITHIN THE PAST 12 MONTHS, THE FOOD YOU BOUGHT JUST DIDN'T LAST AND YOU DIDN'T HAVE MONEY TO GET MORE.: NEVER TRUE

## 2023-02-11 SDOH — ECONOMIC STABILITY: FOOD INSECURITY: WITHIN THE PAST 12 MONTHS, YOU WORRIED THAT YOUR FOOD WOULD RUN OUT BEFORE YOU GOT MONEY TO BUY MORE.: NEVER TRUE

## 2023-02-11 SDOH — ECONOMIC STABILITY: INCOME INSECURITY: HOW HARD IS IT FOR YOU TO PAY FOR THE VERY BASICS LIKE FOOD, HOUSING, MEDICAL CARE, AND HEATING?: NOT HARD AT ALL

## 2023-02-11 SDOH — HEALTH STABILITY: PHYSICAL HEALTH: ON AVERAGE, HOW MANY MINUTES DO YOU ENGAGE IN EXERCISE AT THIS LEVEL?: 40 MIN

## 2023-02-11 ASSESSMENT — ENCOUNTER SYMPTOMS
ARTHRALGIAS: 0
HEADACHES: 0
HEMATURIA: 0
BREAST MASS: 0
HEARTBURN: 0
DIZZINESS: 0
CONSTIPATION: 0
NERVOUS/ANXIOUS: 0
DIARRHEA: 0
EYE PAIN: 0
FREQUENCY: 0
SORE THROAT: 0
CHILLS: 0
NAUSEA: 0
COUGH: 0
PARESTHESIAS: 0
FEVER: 0
SHORTNESS OF BREATH: 0
DYSURIA: 0
JOINT SWELLING: 0
ABDOMINAL PAIN: 0
WEAKNESS: 0
PALPITATIONS: 0
HEMATOCHEZIA: 0
MYALGIAS: 1

## 2023-02-11 ASSESSMENT — LIFESTYLE VARIABLES
HOW MANY STANDARD DRINKS CONTAINING ALCOHOL DO YOU HAVE ON A TYPICAL DAY: PATIENT DOES NOT DRINK
HOW OFTEN DO YOU HAVE SIX OR MORE DRINKS ON ONE OCCASION: NEVER
HOW OFTEN DO YOU HAVE A DRINK CONTAINING ALCOHOL: NEVER
AUDIT-C TOTAL SCORE: 0
SKIP TO QUESTIONS 9-10: 1

## 2023-02-11 ASSESSMENT — SOCIAL DETERMINANTS OF HEALTH (SDOH)
IN A TYPICAL WEEK, HOW MANY TIMES DO YOU TALK ON THE PHONE WITH FAMILY, FRIENDS, OR NEIGHBORS?: MORE THAN THREE TIMES A WEEK
DO YOU BELONG TO ANY CLUBS OR ORGANIZATIONS SUCH AS CHURCH GROUPS UNIONS, FRATERNAL OR ATHLETIC GROUPS, OR SCHOOL GROUPS?: YES
HOW OFTEN DO YOU ATTEND CHURCH OR RELIGIOUS SERVICES?: MORE THAN 4 TIMES PER YEAR
HOW OFTEN DO YOU GET TOGETHER WITH FRIENDS OR RELATIVES?: ONCE A WEEK

## 2023-02-11 ASSESSMENT — ACTIVITIES OF DAILY LIVING (ADL): CURRENT_FUNCTION: NO ASSISTANCE NEEDED

## 2023-02-15 ENCOUNTER — OFFICE VISIT (OUTPATIENT)
Dept: PEDIATRICS | Facility: CLINIC | Age: 73
End: 2023-02-15
Payer: COMMERCIAL

## 2023-02-15 ENCOUNTER — THERAPY VISIT (OUTPATIENT)
Dept: PHYSICAL THERAPY | Facility: CLINIC | Age: 73
End: 2023-02-15
Payer: COMMERCIAL

## 2023-02-15 VITALS
DIASTOLIC BLOOD PRESSURE: 70 MMHG | WEIGHT: 129.2 LBS | RESPIRATION RATE: 18 BRPM | SYSTOLIC BLOOD PRESSURE: 124 MMHG | BODY MASS INDEX: 21.52 KG/M2 | HEART RATE: 56 BPM | HEIGHT: 65 IN | TEMPERATURE: 97.3 F | OXYGEN SATURATION: 100 %

## 2023-02-15 DIAGNOSIS — G89.29 CHRONIC RIGHT-SIDED LOW BACK PAIN: ICD-10-CM

## 2023-02-15 DIAGNOSIS — M25.511 SHOULDER PAIN, RIGHT: Primary | ICD-10-CM

## 2023-02-15 DIAGNOSIS — E78.00 PURE HYPERCHOLESTEROLEMIA: ICD-10-CM

## 2023-02-15 DIAGNOSIS — M80.00XD AGE-RELATED OSTEOPOROSIS WITH CURRENT PATHOLOGICAL FRACTURE WITH ROUTINE HEALING: ICD-10-CM

## 2023-02-15 DIAGNOSIS — G25.0 ESSENTIAL TREMOR: ICD-10-CM

## 2023-02-15 DIAGNOSIS — Z00.00 ENCOUNTER FOR MEDICARE ANNUAL WELLNESS EXAM: Primary | ICD-10-CM

## 2023-02-15 DIAGNOSIS — M54.50 CHRONIC RIGHT-SIDED LOW BACK PAIN: ICD-10-CM

## 2023-02-15 DIAGNOSIS — I10 ESSENTIAL HYPERTENSION: ICD-10-CM

## 2023-02-15 DIAGNOSIS — I10 PRIMARY HYPERTENSION: ICD-10-CM

## 2023-02-15 DIAGNOSIS — E06.3 HYPOTHYROIDISM DUE TO HASHIMOTO'S THYROIDITIS: ICD-10-CM

## 2023-02-15 LAB
ALBUMIN SERPL BCG-MCNC: 4.3 G/DL (ref 3.5–5.2)
ALP SERPL-CCNC: 41 U/L (ref 35–104)
ALT SERPL W P-5'-P-CCNC: 19 U/L (ref 10–35)
ANION GAP SERPL CALCULATED.3IONS-SCNC: 13 MMOL/L (ref 7–15)
AST SERPL W P-5'-P-CCNC: 33 U/L (ref 10–35)
BILIRUB SERPL-MCNC: 0.2 MG/DL
BUN SERPL-MCNC: 12 MG/DL (ref 8–23)
CALCIUM SERPL-MCNC: 9.5 MG/DL (ref 8.8–10.2)
CHLORIDE SERPL-SCNC: 102 MMOL/L (ref 98–107)
CHOLEST SERPL-MCNC: 170 MG/DL
CREAT SERPL-MCNC: 0.64 MG/DL (ref 0.51–0.95)
DEPRECATED HCO3 PLAS-SCNC: 23 MMOL/L (ref 22–29)
GFR SERPL CREATININE-BSD FRML MDRD: >90 ML/MIN/1.73M2
GLUCOSE SERPL-MCNC: 84 MG/DL (ref 70–99)
HDLC SERPL-MCNC: 50 MG/DL
LDLC SERPL CALC-MCNC: 98 MG/DL
NONHDLC SERPL-MCNC: 120 MG/DL
POTASSIUM SERPL-SCNC: 4.7 MMOL/L (ref 3.4–5.3)
PROT SERPL-MCNC: 6.8 G/DL (ref 6.4–8.3)
SODIUM SERPL-SCNC: 138 MMOL/L (ref 136–145)
TRIGL SERPL-MCNC: 108 MG/DL
TSH SERPL DL<=0.005 MIU/L-ACNC: 1.56 UIU/ML (ref 0.3–4.2)

## 2023-02-15 PROCEDURE — 97112 NEUROMUSCULAR REEDUCATION: CPT | Mod: GP | Performed by: PHYSICAL THERAPIST

## 2023-02-15 PROCEDURE — 80053 COMPREHEN METABOLIC PANEL: CPT | Performed by: INTERNAL MEDICINE

## 2023-02-15 PROCEDURE — 99213 OFFICE O/P EST LOW 20 MIN: CPT | Mod: 25 | Performed by: INTERNAL MEDICINE

## 2023-02-15 PROCEDURE — G0439 PPPS, SUBSEQ VISIT: HCPCS | Performed by: INTERNAL MEDICINE

## 2023-02-15 PROCEDURE — 36415 COLL VENOUS BLD VENIPUNCTURE: CPT | Performed by: INTERNAL MEDICINE

## 2023-02-15 PROCEDURE — 84443 ASSAY THYROID STIM HORMONE: CPT | Performed by: INTERNAL MEDICINE

## 2023-02-15 PROCEDURE — 80061 LIPID PANEL: CPT | Performed by: INTERNAL MEDICINE

## 2023-02-15 PROCEDURE — 97110 THERAPEUTIC EXERCISES: CPT | Mod: GP | Performed by: PHYSICAL THERAPIST

## 2023-02-15 RX ORDER — AMLODIPINE BESYLATE 5 MG/1
5 TABLET ORAL DAILY
Qty: 90 TABLET | Refills: 3 | Status: SHIPPED | OUTPATIENT
Start: 2023-02-15 | End: 2024-02-13

## 2023-02-15 RX ORDER — PRIMIDONE 250 MG/1
250 TABLET ORAL AT BEDTIME
Qty: 90 TABLET | Refills: 1 | Status: SHIPPED | OUTPATIENT
Start: 2023-02-15 | End: 2023-08-03

## 2023-02-15 RX ORDER — ASPIRIN 81 MG/1
81 TABLET, CHEWABLE ORAL DAILY
COMMUNITY

## 2023-02-15 RX ORDER — LISINOPRIL 40 MG/1
40 TABLET ORAL DAILY
Qty: 90 TABLET | Refills: 3 | Status: SHIPPED | OUTPATIENT
Start: 2023-02-15 | End: 2024-02-13

## 2023-02-15 RX ORDER — ATORVASTATIN CALCIUM 20 MG/1
TABLET, FILM COATED ORAL
Qty: 90 TABLET | Refills: 0 | Status: SHIPPED | OUTPATIENT
Start: 2023-02-15 | End: 2023-05-04

## 2023-02-15 RX ORDER — LEVOTHYROXINE SODIUM 75 UG/1
TABLET ORAL
Qty: 90 TABLET | Refills: 1 | Status: SHIPPED | OUTPATIENT
Start: 2023-02-15 | End: 2023-10-30

## 2023-02-15 ASSESSMENT — ENCOUNTER SYMPTOMS
JOINT SWELLING: 0
EYE PAIN: 0
NAUSEA: 0
FEVER: 0
CONSTIPATION: 0
NERVOUS/ANXIOUS: 0
BREAST MASS: 0
SHORTNESS OF BREATH: 0
CHILLS: 0
HEADACHES: 0
PARESTHESIAS: 0
HEARTBURN: 0
SORE THROAT: 0
PALPITATIONS: 0
DYSURIA: 0
DIARRHEA: 0
WEAKNESS: 0
HEMATURIA: 0
FREQUENCY: 0
HEMATOCHEZIA: 0
COUGH: 0
MYALGIAS: 1
ABDOMINAL PAIN: 0
ARTHRALGIAS: 0
DIZZINESS: 0

## 2023-02-15 ASSESSMENT — PAIN SCALES - GENERAL: PAINLEVEL: NO PAIN (0)

## 2023-02-15 ASSESSMENT — ACTIVITIES OF DAILY LIVING (ADL): CURRENT_FUNCTION: NO ASSISTANCE NEEDED

## 2023-02-15 NOTE — PROGRESS NOTES
"SUBJECTIVE:   Mily is a 72 year old who presents for Preventive Visit.  Patient has been advised of split billing requirements and indicates understanding: Yes  Are you in the first 12 months of your Medicare coverage?  No    Healthy Habits:     In general, how would you rate your overall health?  Good    Frequency of exercise:  4-5 days/week    Duration of exercise:  30-45 minutes    Do you usually eat at least 4 servings of fruit and vegetables a day, include whole grains    & fiber and avoid regularly eating high fat or \"junk\" foods?  No    Taking medications regularly:  Yes    Medication side effects:  None    Ability to successfully perform activities of daily living:  No assistance needed    Home Safety:  No safety concerns identified    Hearing Impairment:  No hearing concerns    In the past 6 months, have you been bothered by leaking of urine?  No    In general, how would you rate your overall mental or emotional health?  Excellent      PHQ-2 Total Score: 0    Additional concerns today:  No    Have you ever done Advance Care Planning? (For example, a Health Directive, POLST, or a discussion with a medical provider or your loved ones about your wishes): Yes, patient states has an Advance Care Planning document and will bring a copy to the clinic.       Fall risk  Fallen 2 or more times in the past year?: Yes  Any fall with injury in the past year?: No    Cognitive Screening   1) Repeat 3 items (Leader, Season, Table)    2) Clock draw: NORMAL  3) 3 item recall: Recalls 3 objects  Results: 3 items recalled: COGNITIVE IMPAIRMENT LESS LIKELY    Mini-CogTM Copyright S [employer]. Licensed by the author for use in [facility]; reprinted with permission (samuel@.[facility]). All rights reserved.      Overall doing well, back and shoulder pain is improving with physical therapy and shoulder injection.     Still with tremor but mostly with writing.     Tolerating medications well without issues.     Reviewed and " updated as needed this visit by clinical staff   Tobacco  Allergies  Meds              Reviewed and updated as needed this visit by Provider                 Social History     Tobacco Use     Smoking status: Former     Packs/day: 0.00     Years: 0.00     Pack years: 0.00     Types: Cigarettes     Quit date: 2003     Years since quittin.1     Smokeless tobacco: Never   Substance Use Topics     Alcohol use: Yes     Alcohol/week: 11.7 standard drinks     Comment: occ     If you drink alcohol do you typically have >3 drinks per day or >7 drinks per week? no      No flowsheet data found.            Current providers sharing in care for this patient include:   Patient Care Team:  Meri Pacheco MD as PCP - General (Internal Medicine)  Meri Pacheco MD as Assigned PCP  Cleopatra Elizondo RN as Personal Advocate & Liaison (PAL)  Phong Mckeon DPM as Assigned Musculoskeletal Provider  Serafin Mccain DO as Assigned Neuroscience Provider    The following health maintenance items are reviewed in Epic and correct as of today:  Health Maintenance   Topic Date Due     ANNUAL REVIEW OF HM ORDERS  Never done     MEDICARE ANNUAL WELLNESS VISIT  2023     DTAP/TDAP/TD IMMUNIZATION (7 - Td or Tdap) 2023     MAMMO SCREENING  2023     FALL RISK ASSESSMENT  02/15/2024     COLORECTAL CANCER SCREENING  2024     DEXA  2025     ADVANCE CARE PLANNING  2027     LIPID  [id number]     PHQ-2 (once per calendar year)  Completed     INFLUENZA VACCINE  Completed     Pneumococcal Vaccine: 65+ Years  Completed     ZOSTER IMMUNIZATION  Completed     COVID-19 Vaccine  Completed     IPV IMMUNIZATION  Aged Out     MENINGITIS IMMUNIZATION  Aged Out     HEPATITIS C SCREENING  Discontinued     Patient Active Problem List   Diagnosis     Hypothyroidism     Hypertension     Hypercholesterolemia     Essential tremor     Shoulder pain, right     Chronic right-sided low back pain     Age-related  osteoporosis with current pathological fracture with routine healing     Past Surgical History:   Procedure Laterality Date     Bilateral Foot surgery       COLONOSCOPY  2014    Dr. Naylor [facility]     COLONOSCOPY N/A 2019    Procedure: COLONOSCOPY;  Surgeon: Catrachito Naylor MD;  Location:  GI     EYE SURGERY      cataract removal, macular hole repair     LUMPECTOMY BREAST  2000    Right       Social History     Tobacco Use     Smoking status: Former     Packs/day: 0.00     Years: 0.00     Pack years: 0.00     Types: Cigarettes     Quit date: 2003     Years since quittin.1     Smokeless tobacco: Never   Substance Use Topics     Alcohol use: Yes     Alcohol/week: 11.7 standard drinks     Comment: occ     Family History   Problem Relation Age of Onset     Cancer - colorectal Mother         at age 94     Breast Cancer Mother      Diabetes Sister         pre-diabetic     Breast Cancer Sister      Macular Degeneration Father      Bladder Cancer Father      Stomach Cancer Maternal Grandfather      Diabetes Sister      Breast Cancer Sister              Review of Systems   Constitutional: Negative for chills and fever.   HENT: Negative for congestion, ear pain, hearing loss and sore throat.    Eyes: Negative for pain and visual disturbance.   Respiratory: Negative for cough and shortness of breath.    Cardiovascular: Negative for chest pain, palpitations and peripheral edema.   Gastrointestinal: Negative for abdominal pain, constipation, diarrhea, heartburn, hematochezia and nausea.   Breasts:  Negative for tenderness, breast mass and discharge.   Genitourinary: Negative for dysuria, frequency, genital sores, hematuria, pelvic pain, urgency, vaginal bleeding and vaginal discharge.   Musculoskeletal: Positive for myalgias. Negative for arthralgias and joint swelling.   Skin: Negative for rash.   Neurological: Negative for dizziness, weakness, headaches and paresthesias.   Psychiatric/Behavioral:  "Negative for mood changes. The patient is not nervous/anxious.          OBJECTIVE:   /70 (BP Location: Right arm, Patient Position: Sitting, Cuff Size: Adult Small)   Pulse 56   Temp 97.3  F (36.3  C) (Tympanic)   Resp 18   Ht 1.638 m (5' 4.5\")   Wt 58.6 kg (129 lb 3.2 oz)   SpO2 100%   BMI 21.83 kg/m   Estimated body mass index is 21.83 kg/m  as calculated from the following:    Height as of this encounter: 1.638 m (5' 4.5\").    Weight as of this encounter: 58.6 kg (129 lb 3.2 oz).  Physical Exam  GENERAL: healthy, alert and no distress  EYES: Eyes grossly normal to inspection, PERRL and conjunctivae and sclerae normal  HENT: ear canals and TM's normal, nose and mouth without ulcers or lesions  NECK: no adenopathy, no asymmetry, masses, or scars and thyroid normal to palpation  RESP: lungs clear to auscultation - no rales, rhonchi or wheezes  CV: regular rate and rhythm, normal S1 S2, no S3 or S4, no murmur, click or rub, no peripheral edema and peripheral pulses strong  ABDOMEN: soft, nontender, no hepatosplenomegaly, no masses and bowel sounds normal  MS: no gross musculoskeletal defects noted, no edema  SKIN: no suspicious lesions or rashes  NEURO: Normal strength and tone, mentation intact and speech normal  PSYCH: mentation appears normal, affect normal/bright    Diagnostic Test Results:  Labs reviewed in Epic    ASSESSMENT / PLAN:   1. Encounter for Medicare annual wellness exam  Counseling as below. Encouraged her to get Tdap at pharmacy.     2. Hypothyroidism due to Hashimoto's thyroiditis  Due for labs, otherwise asymptomatic.   - levothyroxine (SYNTHROID/LEVOTHROID) 75 MCG tablet; Take 1 tablet (75 mcg) by mouth once daily  Dispense: 90 tablet; Refill: 1  - TSH with free T4 reflex; Future  - TSH with free T4 reflex    3. Essential hypertension  Well controlled on current regimen.   - amLODIPine (NORVASC) 5 MG tablet; Take 1 tablet (5 mg) by mouth daily  Dispense: 90 tablet; Refill: 3    4. " Pure hypercholesterolemia  Well managed, due for labs.   - atorvastatin (LIPITOR) 20 MG tablet; Take 1 tablet (20 mg) by mouth once daily  Dispense: 90 tablet; Refill: 0  - Lipid panel reflex to direct LDL Fasting; Future  - Comprehensive metabolic panel (BMP + Alb, Alk Phos, ALT, AST, Total. Bili, TP); Future  - Lipid panel reflex to direct LDL Fasting  - Comprehensive metabolic panel (BMP + Alb, Alk Phos, ALT, AST, Total. Bili, TP)    5. Primary hypertension  Well controlled on current regimen.   - lisinopril (ZESTRIL) 40 MG tablet; Take 1 tablet (40 mg) by mouth daily  Dispense: 90 tablet; Refill: 3  - Comprehensive metabolic panel (BMP + Alb, Alk Phos, ALT, AST, Total. Bili, TP); Future  - Comprehensive metabolic panel (BMP + Alb, Alk Phos, ALT, AST, Total. Bili, TP)    6. Age-related osteoporosis with current pathological fracture with routine healing  Tolerating bisphosphonate well, due for DEXA in 2025.    7. Essential tremor  Continues to struggle with this, is interested in trying slightly higher dose of primidone. Reviewed side effects.   - primidone (MYSOLINE) 250 MG tablet; Take 1 tablet (250 mg) by mouth At Bedtime  Dispense: 90 tablet; Refill: 1        COUNSELING:  Reviewed preventive health counseling, as reflected in patient instructions       Regular exercise       Healthy diet/nutrition       Vision screening       Hearing screening       Osteoporosis prevention/bone health        She reports that she quit smoking about 20 years ago. Her smoking use included cigarettes. She has never used smokeless tobacco.      Appropriate preventive services were discussed with this patient, including applicable screening as appropriate for cardiovascular disease, diabetes, osteopenia/osteoporosis, and glaucoma.  As appropriate for age/gender, discussed screening for colorectal cancer, prostate cancer, breast cancer, and cervical cancer. Checklist reviewing preventive services available has been given to the  patient.    Reviewed patients plan of care and provided an AVS. The Intermediate Care Plan ( asthma action plan, low back pain action plan, and migraine action plan) for Mily meets the Care Plan requirement. This Care Plan has been established and reviewed with the Patient.          Meri Collado MD  [facility]    Identified Health Risks:

## 2023-02-15 NOTE — PATIENT INSTRUCTIONS
Get a tetanus booster at your pharmacy.    Let's try increasing the primidone to to 250mg daily - this may cause you to be sleepy - I sent in a new prescription for you.    The rest of your medications are refilled. We will check labs today. Mammogram later this summer.    Patient Education   Personalized Prevention Plan  You are due for the preventive services outlined below.  Your care team is available to assist you in scheduling these services.  If you have already completed any of these items, please share that information with your care team to update in your medical record.  Health Maintenance Due   Topic Date Due    ANNUAL REVIEW OF HM ORDERS  Never done    Annual Wellness Visit  02/09/2023

## 2023-03-01 ENCOUNTER — OFFICE VISIT (OUTPATIENT)
Dept: PHYSICAL MEDICINE AND REHAB | Facility: CLINIC | Age: 73
End: 2023-03-01
Payer: COMMERCIAL

## 2023-03-01 VITALS
WEIGHT: 131 LBS | SYSTOLIC BLOOD PRESSURE: 150 MMHG | BODY MASS INDEX: 21.83 KG/M2 | HEART RATE: 56 BPM | DIASTOLIC BLOOD PRESSURE: 65 MMHG | HEIGHT: 65 IN

## 2023-03-01 DIAGNOSIS — M53.3 SACROILIAC JOINT PAIN: ICD-10-CM

## 2023-03-01 DIAGNOSIS — M41.126 ADOLESCENT IDIOPATHIC SCOLIOSIS OF LUMBAR REGION: ICD-10-CM

## 2023-03-01 DIAGNOSIS — M43.8X9 SAGITTAL PLANE IMBALANCE: ICD-10-CM

## 2023-03-01 DIAGNOSIS — M48.061 FORAMINAL STENOSIS OF LUMBAR REGION: ICD-10-CM

## 2023-03-01 DIAGNOSIS — M54.50 LUMBAR SPINE PAIN: Primary | ICD-10-CM

## 2023-03-01 DIAGNOSIS — M47.816 LUMBAR FACET ARTHROPATHY: ICD-10-CM

## 2023-03-01 PROCEDURE — 99214 OFFICE O/P EST MOD 30 MIN: CPT | Performed by: PHYSICAL MEDICINE & REHABILITATION

## 2023-03-01 ASSESSMENT — PAIN SCALES - GENERAL: PAINLEVEL: EXTREME PAIN (8)

## 2023-03-01 NOTE — PROGRESS NOTES
Assessment/Plan:      Mily was seen today for back pain.    Diagnoses and all orders for this visit:    Lumbar spine pain    Lumbar facet arthropathy  -     PAIN Medial Branch Block Lumbar Two Levels Right; Future    Sagittal plane imbalance    Adolescent idiopathic scoliosis of lumbar region    Sacroiliac joint pain    Foraminal stenosis of lumbar region         Assessment: Pleasant 72 year old female with a history of Hypothyroidism,with a history of hypertension, with a history of hypertension, hypothyroidism, breast cancer treated with chemotherapy and subsequent polyneuropathy, hyperlipidemia, osteoporosis with:     1.    Persistent chronic thoracolumbar spine pain right low back into the gluteal region.  Likely multifactorial related to right-sided lumbar scoliosis, facet arthropathy, sacroiliac joint pain in setting of sagittal plane imbalance and flatback syndrome.  She has some occasional right anterior knee pain may be related to severe right foraminal stenosis L4-5 but no radicular pain.  No improvement with physical therapy and gabapentin.  Pain remains consistent with right-sided facet arthropathy.  No specific radicular pain.     2.  Bilateral lower extremity paresthesias consistent with peripheral polyneuropathy.  No benefit* With gabapentin    Discussion:    We discussed the diagnosis and treatment options.  I reviewed the MRI again today showing severe right foraminal stenosis L4-5 along with severe facet arthropathy L4-L5-S1 on the right..  We discussed SI joint injection versus medial branch blocks versus epidural.  No specific radicular pain.    2.  Given much of her pain is over the right lumbar spine and SI joint with the severity of the facet arthropathy and negative provocative maneuvers for SI pain today recommend right L3, 4, 5 medial branch blocks progressing to confirmatory blocks if indicated.    3.  May wean off of gabapentin.    4.  If medial branch blocks are not helpful would  likely recommend right L4-5 transforaminal epidural steroid injection given the severity of the foraminal stenosis.    5.  Continue physical therapy exercises.    6.  Follow-up at her earliest convenience for injections.    It was our pleasure caring for your patient today, if there any questions or concerns please do not hesitate to contact us.      Subjective:   Patient ID: Mily Hilliard is a 72 year old female.    History of Present Illness: Patient presents with her  today for follow-up of low back pain.  Most of her back pain is on the right side PSIS SI joint and out the lumbar spine.  Most significant pain is at the level of L4-5 and L5-S1.  She has been doing her home exercises.  Doing well with pelvic tilts and thoracic extension exercises.  No change in her overall pain however.  Still has worse pain with sitting or standing for long time better with moving/walking or lying down.  Pain is a 10/10 at worst 8/10 today 5/10 at best.  Occasional sharp pain into the right knee but the back pain is constant waxing waning in intensity.  Gabapentin 300 mg at bedtime is not helpful for the bilateral foot paresthesias but does cause drowsiness.  Reviewed physical therapy notes.  She has attended 4 visits of physical therapy continues to improve her right shoulder by her lumbar spine has been relatively unchanged intermittent.    Imaging: MRI lumbar spine from January 10, 2022 personally reviewed for medical decision-making purposes.  Severe disc height loss L3-4 mild L4-5 L5-S1 L2-3. L5-S1 moderate facet arthropathy no central canal or foraminal stenosis.  L4-5 severe right foraminal stenosis.  L3-4 severe disc height loss with mild foraminal stenosis and no central stenosis of significance with mild facet arthropathy.  Annular tear at L2-3 with mild facet arthropathy and slight left lateral recess stenosis.  Retrolisthesis L4 and L5 and L3 on L4.  Severe right foraminal stenosis L4-5.    Review of  "Systems: Pertinent positives: Paresthesias of the feet.  Pertinent negatives: No   weakness.  No bowel or bladder incontinence.  No urinary retention.  No fevers, unintentional weight loss, balance changes, headaches, frequent falling, difficulty swallowing, or coordination difficulties.  All others reviewed are negative.    Past Medical History:   Diagnosis Date     Arthritis      Breast cancer (H) 2000    right breast     Hypercholesterolemia      Hypertension      Hypothyroidism        The following portions of the patient's history were reviewed and updated as appropriate: allergies, current medications, past family history, past medical history, past social history, past surgical history and problem list.           Objective:   Physical Exam:    BP (!) 150/65   Pulse 56   Ht 5' 4.5\" (1.638 m)   Wt 131 lb (59.4 kg)   BMI 22.14 kg/m    Body mass index is 22.14 kg/m .      General: Alert and oriented with normal affect. Attention, knowledge, memory, and language are intact. No acute distress.   Eyes: Sclerae are clear.  Respirations: Unlabored. CV: No lower extremity edema.  Skin: No rashes seen.    Gait:  Nonantalgic  Tenderness over the right PSIS right lumbar paraspinals L4-5 and L5-S1 as well as the right SI joint.  Right thoracolumbar scoliotic curve noted.  Negative thigh thrust Gaenslen's on the right.  Mild positive supine straight leg raise on the right for low back pain.  Decreased range of motion right hip and external rotation without SI pain.  Sensation is intact to light touch throughout the   lower extremities.  Reflexes are 0 patellar and Achilles    Manual muscle testing reveals:  Right /Left out of 5     5/5 hip flexors  5/5 knee flexors  5/5 knee extensors  5/5 ankle plantar flexors  5/5 ankle dorsiflexors  5/5   ankle evertors  "

## 2023-03-01 NOTE — LETTER
3/1/2023         RE: Mily Hilliard  [address]  [place] [address]        Dear Colleague,    Thank you for referring your patient, Mily Hilliard, to the [facility]. Please see a copy of my visit note below.    Assessment/Plan:      Mily was seen today for back pain.    Diagnoses and all orders for this visit:    Lumbar spine pain    Lumbar facet arthropathy  -     PAIN Medial Branch Block Lumbar Two Levels Right; Future    Sagittal plane imbalance    Adolescent idiopathic scoliosis of lumbar region    Sacroiliac joint pain    Foraminal stenosis of lumbar region         Assessment: Pleasant 72 year old female with a history of Hypothyroidism,with a history of hypertension, with a history of hypertension, hypothyroidism, breast cancer treated with chemotherapy and subsequent polyneuropathy, hyperlipidemia, osteoporosis with:     1.    Persistent chronic thoracolumbar spine pain right low back into the gluteal region.  Likely multifactorial related to right-sided lumbar scoliosis, facet arthropathy, sacroiliac joint pain in setting of sagittal plane imbalance and flatback syndrome.  She has some occasional right anterior knee pain may be related to severe right foraminal stenosis L4-5 but no radicular pain.  No improvement with physical therapy and gabapentin.  Pain remains consistent with right-sided facet arthropathy.  No specific radicular pain.     2.  Bilateral lower extremity paresthesias consistent with peripheral polyneuropathy.  No benefit* With gabapentin    Discussion:    We discussed the diagnosis and treatment options.  I reviewed the MRI again today showing severe right foraminal stenosis L4-5 along with severe facet arthropathy L4-L5-S1 on the right..  We discussed SI joint injection versus medial branch blocks versus epidural.  No specific radicular pain.    2.  Given much of her pain is over the right lumbar spine and SI joint with the severity of the  facet arthropathy and negative provocative maneuvers for SI pain today recommend right L3, 4, 5 medial branch blocks progressing to confirmatory blocks if indicated.    3.  May wean off of gabapentin.    4.  If medial branch blocks are not helpful would likely recommend right L4-5 transforaminal epidural steroid injection given the severity of the foraminal stenosis.    5.  Continue physical therapy exercises.    6.  Follow-up at her earliest convenience for injections.    It was our pleasure caring for your patient today, if there any questions or concerns please do not hesitate to contact us.      Subjective:   Patient ID: Mily Hilliard is a 72 year old female.    History of Present Illness: Patient presents with her  today for follow-up of low back pain.  Most of her back pain is on the right side PSIS SI joint and out the lumbar spine.  Most significant pain is at the level of L4-5 and L5-S1.  She has been doing her home exercises.  Doing well with pelvic tilts and thoracic extension exercises.  No change in her overall pain however.  Still has worse pain with sitting or standing for long time better with moving/walking or lying down.  Pain is a 10/10 at worst 8/10 today 5/10 at best.  Occasional sharp pain into the right knee but the back pain is constant waxing waning in intensity.  Gabapentin 300 mg at bedtime is not helpful for the bilateral foot paresthesias but does cause drowsiness.  Reviewed physical therapy notes.  She has attended 4 visits of physical therapy continues to improve her right shoulder by her lumbar spine has been relatively unchanged intermittent.    Imaging: MRI lumbar spine from January 10, 2022 personally reviewed for medical decision-making purposes.  Severe disc height loss L3-4 mild L4-5 L5-S1 L2-3. L5-S1 moderate facet arthropathy no central canal or foraminal stenosis.  L4-5 severe right foraminal stenosis.  L3-4 severe disc height loss with mild foraminal stenosis and  "no central stenosis of significance with mild facet arthropathy.  Annular tear at L2-3 with mild facet arthropathy and slight left lateral recess stenosis.  Retrolisthesis L4 and L5 and L3 on L4.  Severe right foraminal stenosis L4-5.    Review of Systems: Pertinent positives: Paresthesias of the feet.  Pertinent negatives: No   weakness.  No bowel or bladder incontinence.  No urinary retention.  No fevers, unintentional weight loss, balance changes, headaches, frequent falling, difficulty swallowing, or coordination difficulties.  All others reviewed are negative.    Past Medical History:   Diagnosis Date     Arthritis      Breast cancer (H) 2000    right breast     Hypercholesterolemia      Hypertension      Hypothyroidism        The following portions of the patient's history were reviewed and updated as appropriate: allergies, current medications, past family history, past medical history, past social history, past surgical history and problem list.           Objective:   Physical Exam:    BP (!) 150/65   Pulse 56   Ht 5' 4.5\" (1.638 m)   Wt 131 lb (59.4 kg)   BMI 22.14 kg/m    Body mass index is 22.14 kg/m .      General: Alert and oriented with normal affect. Attention, knowledge, memory, and language are intact. No acute distress.   Eyes: Sclerae are clear.  Respirations: Unlabored. CV: No lower extremity edema.  Skin: No rashes seen.    Gait:  Nonantalgic  Tenderness over the right PSIS right lumbar paraspinals L4-5 and L5-S1 as well as the right SI joint.  Right thoracolumbar scoliotic curve noted.  Negative thigh thrust Gaenslen's on the right.  Mild positive supine straight leg raise on the right for low back pain.  Decreased range of motion right hip and external rotation without SI pain.  Sensation is intact to light touch throughout the   lower extremities.  Reflexes are 0 patellar and Achilles    Manual muscle testing reveals:  Right /Left out of 5     5/5 hip flexors  5/5 knee flexors  5/5 knee " extensors  5/5 ankle plantar flexors  5/5 ankle dorsiflexors  5/5   ankle evertors      Again, thank you for allowing me to participate in the care of your patient.        Sincerely,        Serafin Mccain, DO

## 2023-03-01 NOTE — PATIENT INSTRUCTIONS
A Right medial branch block has been ordered today. Please schedule this injection at least  2 weeks from now to allow time for insurance prior authorization. On the day of your injection, you cannot be sick or taking antibiotics. If you become sick and are prescribed, please call the clinic so your injection can be rescheduled for once you have completed your antibiotics. You will need to bring a  with you for your injection. If you have any questions or concerns prior to your injection, please do not hesitate to call the nurse navigation line at [phone].   Continue with physical therapy  You may wean off of the gabapentin

## 2023-03-03 NOTE — PROGRESS NOTES
S: Patient is a 72 year old, left hand dominant female seen today in follow up for right shoulder pain.    They were previously evaluated on 2023 Since this visit they report 30% improvement in pain and symtoms.  Pain is located dorsal and lateral right shoulder, and described as dull aching pain. Increased pain with using the shoulder.  Pain does not wake at nighttime. Pain is improved by topical creams, tylenol. They have tried the following therapies: physical therapy (5 visits), Tylenol      Current pain level: 5/10, Worst pain level: 8/10.     Patient is currently retired             Patient Active Problem List   Diagnosis     Hypothyroidism     Hypertension     Hypercholesterolemia     Essential tremor     Shoulder pain, right     Chronic right-sided low back pain     Age-related osteoporosis with current pathological fracture with routine healing            Past Medical History:   Diagnosis Date     Arthritis      Breast cancer (H)     right breast     Hypercholesterolemia      Hypertension      Hypothyroidism             Past Surgical History:   Procedure Laterality Date     Bilateral Foot surgery       COLONOSCOPY  2014    Dr. Naylor [facility]     COLONOSCOPY N/A 2019    Procedure: COLONOSCOPY;  Surgeon: Catrachito Naylor MD;  Location:  GI     EYE SURGERY      cataract removal, macular hole repair     LUMPECTOMY BREAST  2000    Right            Social History     Tobacco Use     Smoking status: Former     Packs/day: 0.00     Years: 0.00     Pack years: 0.00     Types: Cigarettes     Quit date: 2003     Years since quittin.1     Smokeless tobacco: Never   Substance Use Topics     Alcohol use: Yes     Alcohol/week: 11.7 standard drinks     Comment: occ            Family History   Problem Relation Age of Onset     Cancer - colorectal Mother         at age 94     Breast Cancer Mother      Diabetes Sister         pre-diabetic     Breast Cancer Sister      Macular Degeneration  Father      Bladder Cancer Father      Stomach Cancer Maternal Grandfather      Diabetes Sister      Breast Cancer Sister                Allergies   Allergen Reactions     No Known Allergies             Current Outpatient Medications   Medication Sig Dispense Refill     alendronate (FOSAMAX) 70 MG tablet Take 1 tablet (70 mg) by mouth every 7 days 12 tablet 4     amLODIPine (NORVASC) 5 MG tablet Take 1 tablet (5 mg) by mouth daily 90 tablet 3     aspirin (ASA) 81 MG chewable tablet Take 81 mg by mouth daily       atorvastatin (LIPITOR) 20 MG tablet Take 1 tablet (20 mg) by mouth once daily 90 tablet 0     calcium acetate (PHOSLO) 667 MG CAPS capsule Take 667 mg by mouth daily       cyclobenzaprine (FLEXERIL) 5 MG tablet Take 1 tablet (5 mg) by mouth 3 times daily as needed for muscle spasms 30 tablet 0     gabapentin (NEURONTIN) 100 MG capsule 100mg at bedtime x 3 days, then may increase to 200mg x 3 days, then may increase to 300mg at bedtime 90 capsule 1     levothyroxine (SYNTHROID/LEVOTHROID) 75 MCG tablet Take 1 tablet (75 mcg) by mouth once daily 90 tablet 1     lisinopril (ZESTRIL) 40 MG tablet Take 1 tablet (40 mg) by mouth daily 90 tablet 3     Multiple Vitamins-Minerals (MULTIVITAMIN PO)        primidone (MYSOLINE) 250 MG tablet Take 1 tablet (250 mg) by mouth At Bedtime 90 tablet 1     Vitamin D, Cholecalciferol, 25 MCG (1000 UT) TABS             Review Of Systems  Skin: negative  Eyes: negative  Ears/Nose/Throat: negative  Respiratory: No shortness of breath, dyspnea on exertion, cough, or hemoptysis    O: Physical Exam:  L handed but uses RUE quite a bit.  Positive forced forward flexion impingement sign.      Lab:       D dimer 3.6    Images:  EXAM: XR SHOULDER RIGHT G/E 3 VIEWS  LOCATION: [facility]  DATE/TIME: 12/28/2022 6:18 PM     INDICATION: Acute pain of right shoulder.  COMPARISON: 01/09/2020 radiograph of the chest.                                                                       IMPRESSION: Mild glenohumeral and acromioclavicular joint degenerative change. There is no evidence of an acute fracture.     Chronic appearing area of calcification/mineralization and surgical clips in the right chest wall/axillary region. Atherosclerotic vascular calcifications.    A:  R shoulder subacromial bursitis    P: Inject R shoulder subacromial bursitis after verbal and written consent/ethyl chloride/ chloroprep  Follow outcomes  Notify if exacerbation symptoms  See back 4-6 weeks  Continue rehab           In addition to the above assessment and plan each active problem on Mily's problem list was evaluated today. This included the questioning of Mily for any medication problems. We will continue the current treatment plan for these active problems except as noted.    Large Joint Injection/Arthocentesis: R subacromial bursa    Date/Time: 3/6/2023 10:06 AM  Performed by: Phong Almanzar MD  Authorized by: Phong Almanzar MD     Indications:  Pain  Needle Size:  25 G  Guidance: landmark guided    Approach:  Posterior  Location:  Shoulder      Site:  R subacromial bursa  Medications:  40 mg triamcinolone 40 MG/ML; 2 mL lidocaine 1 %; 2 mL bupivacaine 0.5 %  Outcome:  Tolerated well, no immediate complications  Procedure discussed: discussed risks, benefits, and alternatives    Consent Given by:  Patient  Prep: patient was prepped and draped in usual sterile fashion

## 2023-03-06 ENCOUNTER — OFFICE VISIT (OUTPATIENT)
Dept: ORTHOPEDICS | Facility: CLINIC | Age: 73
End: 2023-03-06
Payer: COMMERCIAL

## 2023-03-06 ENCOUNTER — THERAPY VISIT (OUTPATIENT)
Dept: PHYSICAL THERAPY | Facility: CLINIC | Age: 73
End: 2023-03-06
Payer: COMMERCIAL

## 2023-03-06 VITALS
BODY MASS INDEX: 21.83 KG/M2 | WEIGHT: 131 LBS | DIASTOLIC BLOOD PRESSURE: 72 MMHG | SYSTOLIC BLOOD PRESSURE: 131 MMHG | HEIGHT: 65 IN

## 2023-03-06 DIAGNOSIS — M75.51 SUBACROMIAL BURSITIS OF RIGHT SHOULDER JOINT: Primary | ICD-10-CM

## 2023-03-06 DIAGNOSIS — M54.50 CHRONIC RIGHT-SIDED LOW BACK PAIN: Primary | ICD-10-CM

## 2023-03-06 DIAGNOSIS — G89.29 CHRONIC RIGHT-SIDED LOW BACK PAIN: Primary | ICD-10-CM

## 2023-03-06 PROCEDURE — 97112 NEUROMUSCULAR REEDUCATION: CPT | Mod: GP | Performed by: PHYSICAL THERAPIST

## 2023-03-06 PROCEDURE — 99213 OFFICE O/P EST LOW 20 MIN: CPT | Mod: 25 | Performed by: ORTHOPAEDIC SURGERY

## 2023-03-06 PROCEDURE — 20610 DRAIN/INJ JOINT/BURSA W/O US: CPT | Mod: RT | Performed by: ORTHOPAEDIC SURGERY

## 2023-03-06 PROCEDURE — 97110 THERAPEUTIC EXERCISES: CPT | Mod: GP | Performed by: PHYSICAL THERAPIST

## 2023-03-06 RX ORDER — TRIAMCINOLONE ACETONIDE 40 MG/ML
40 INJECTION, SUSPENSION INTRA-ARTICULAR; INTRAMUSCULAR
Status: SHIPPED | OUTPATIENT
Start: 2023-03-06

## 2023-03-06 RX ORDER — LIDOCAINE HYDROCHLORIDE 10 MG/ML
2 INJECTION, SOLUTION INFILTRATION; PERINEURAL
Status: SHIPPED | OUTPATIENT
Start: 2023-03-06

## 2023-03-06 RX ORDER — BUPIVACAINE HYDROCHLORIDE 5 MG/ML
2 INJECTION, SOLUTION PERINEURAL
Status: SHIPPED | OUTPATIENT
Start: 2023-03-06

## 2023-03-06 RX ADMIN — LIDOCAINE HYDROCHLORIDE 2 ML: 10 INJECTION, SOLUTION INFILTRATION; PERINEURAL at 10:06

## 2023-03-06 RX ADMIN — TRIAMCINOLONE ACETONIDE 40 MG: 40 INJECTION, SUSPENSION INTRA-ARTICULAR; INTRAMUSCULAR at 10:06

## 2023-03-06 RX ADMIN — BUPIVACAINE HYDROCHLORIDE 2 ML: 5 INJECTION, SOLUTION PERINEURAL at 10:06

## 2023-03-06 NOTE — LETTER
3/6/2023         RE: Mily Hilliard  [address]  [place] [address]        Dear Colleague,    Thank you for referring your patient, Mily Hilliard, to the [facility] [place]. Please see a copy of my visit note below.    S: Patient is a 72 year old, left hand dominant female seen today in follow up for right shoulder pain.    They were previously evaluated on 2023 Since this visit they report 30% improvement in pain and symtoms.  Pain is located dorsal and lateral right shoulder, and described as dull aching pain. Increased pain with using the shoulder.  Pain does not wake at nighttime. Pain is improved by topical creams, tylenol. They have tried the following therapies: physical therapy (5 visits), Tylenol      Current pain level: 5/10, Worst pain level: 8/10.     Patient is currently retired             Patient Active Problem List   Diagnosis     Hypothyroidism     Hypertension     Hypercholesterolemia     Essential tremor     Shoulder pain, right     Chronic right-sided low back pain     Age-related osteoporosis with current pathological fracture with routine healing            Past Medical History:   Diagnosis Date     Arthritis      Breast cancer (H)     right breast     Hypercholesterolemia      Hypertension      Hypothyroidism             Past Surgical History:   Procedure Laterality Date     Bilateral Foot surgery       COLONOSCOPY  2014    Dr. Naylor [facility]     COLONOSCOPY N/A 2019    Procedure: COLONOSCOPY;  Surgeon: Catrachito Naylor MD;  Location:  GI     EYE SURGERY      cataract removal, macular hole repair     LUMPECTOMY BREAST      Right            Social History     Tobacco Use     Smoking status: Former     Packs/day: 0.00     Years: 0.00     Pack years: 0.00     Types: Cigarettes     Quit date: 2003     Years since quittin.1     Smokeless tobacco: Never   Substance Use Topics     Alcohol use: Yes     Alcohol/week:  11.7 standard drinks     Comment: occ            Family History   Problem Relation Age of Onset     Cancer - colorectal Mother         at age 94     Breast Cancer Mother      Diabetes Sister         pre-diabetic     Breast Cancer Sister      Macular Degeneration Father      Bladder Cancer Father      Stomach Cancer Maternal Grandfather      Diabetes Sister      Breast Cancer Sister                Allergies   Allergen Reactions     No Known Allergies             Current Outpatient Medications   Medication Sig Dispense Refill     alendronate (FOSAMAX) 70 MG tablet Take 1 tablet (70 mg) by mouth every 7 days 12 tablet 4     amLODIPine (NORVASC) 5 MG tablet Take 1 tablet (5 mg) by mouth daily 90 tablet 3     aspirin (ASA) 81 MG chewable tablet Take 81 mg by mouth daily       atorvastatin (LIPITOR) 20 MG tablet Take 1 tablet (20 mg) by mouth once daily 90 tablet 0     calcium acetate (PHOSLO) 667 MG CAPS capsule Take 667 mg by mouth daily       cyclobenzaprine (FLEXERIL) 5 MG tablet Take 1 tablet (5 mg) by mouth 3 times daily as needed for muscle spasms 30 tablet 0     gabapentin (NEURONTIN) 100 MG capsule 100mg at bedtime x 3 days, then may increase to 200mg x 3 days, then may increase to 300mg at bedtime 90 capsule 1     levothyroxine (SYNTHROID/LEVOTHROID) 75 MCG tablet Take 1 tablet (75 mcg) by mouth once daily 90 tablet 1     lisinopril (ZESTRIL) 40 MG tablet Take 1 tablet (40 mg) by mouth daily 90 tablet 3     Multiple Vitamins-Minerals (MULTIVITAMIN PO)        primidone (MYSOLINE) 250 MG tablet Take 1 tablet (250 mg) by mouth At Bedtime 90 tablet 1     Vitamin D, Cholecalciferol, 25 MCG (1000 UT) TABS             Review Of Systems  Skin: negative  Eyes: negative  Ears/Nose/Throat: negative  Respiratory: No shortness of breath, dyspnea on exertion, cough, or hemoptysis    O: Physical Exam:  L handed but uses RUE quite a bit.  Positive forced forward flexion impingement sign.      Lab:       D dimer 3.6    Images:   EXAM: XR SHOULDER RIGHT G/E 3 VIEWS  LOCATION: [facility]  DATE/TIME: 12/28/2022 6:18 PM     INDICATION: Acute pain of right shoulder.  COMPARISON: 01/09/2020 radiograph of the chest.                                                                      IMPRESSION: Mild glenohumeral and acromioclavicular joint degenerative change. There is no evidence of an acute fracture.     Chronic appearing area of calcification/mineralization and surgical clips in the right chest wall/axillary region. Atherosclerotic vascular calcifications.    A:  R shoulder subacromial bursitis    P: Inject R shoulder subacromial bursitis after verbal and written consent/ethyl chloride/ chloroprep  Follow outcomes  Notify if exacerbation symptoms  See back 4-6 weeks  Continue rehab           In addition to the above assessment and plan each active problem on Mily's problem list was evaluated today. This included the questioning of Mily for any medication problems. We will continue the current treatment plan for these active problems except as noted.    Large Joint Injection/Arthocentesis: R subacromial bursa    Date/Time: 3/6/2023 10:06 AM  Performed by: Phong Suarez MD  Authorized by: Phong Suarez MD     Indications:  Pain  Needle Size:  25 G  Guidance: landmark guided    Approach:  Posterior  Location:  Shoulder      Site:  R subacromial bursa  Medications:  40 mg triamcinolone 40 MG/ML; 2 mL lidocaine 1 %; 2 mL bupivacaine 0.5 %  Outcome:  Tolerated well, no immediate complications  Procedure discussed: discussed risks, benefits, and alternatives    Consent Given by:  Patient  Prep: patient was prepped and draped in usual sterile fashion              Again, thank you for allowing me to participate in the care of your patient.        Sincerely,        PHONG SUAREZ MD

## 2023-03-13 ENCOUNTER — THERAPY VISIT (OUTPATIENT)
Dept: PHYSICAL THERAPY | Facility: CLINIC | Age: 73
End: 2023-03-13
Payer: COMMERCIAL

## 2023-03-13 DIAGNOSIS — M54.50 CHRONIC RIGHT-SIDED LOW BACK PAIN: Primary | ICD-10-CM

## 2023-03-13 DIAGNOSIS — G89.29 CHRONIC RIGHT-SIDED LOW BACK PAIN: Primary | ICD-10-CM

## 2023-03-13 DIAGNOSIS — M25.511 SHOULDER PAIN, RIGHT: ICD-10-CM

## 2023-03-13 PROCEDURE — 97112 NEUROMUSCULAR REEDUCATION: CPT | Mod: GP | Performed by: PHYSICAL THERAPIST

## 2023-03-13 PROCEDURE — 97110 THERAPEUTIC EXERCISES: CPT | Mod: GP | Performed by: PHYSICAL THERAPIST

## 2023-03-16 ENCOUNTER — RADIOLOGY INJECTION OFFICE VISIT (OUTPATIENT)
Dept: PHYSICAL MEDICINE AND REHAB | Facility: CLINIC | Age: 73
End: 2023-03-16
Attending: PHYSICAL MEDICINE & REHABILITATION
Payer: COMMERCIAL

## 2023-03-16 VITALS
DIASTOLIC BLOOD PRESSURE: 62 MMHG | HEART RATE: 58 BPM | SYSTOLIC BLOOD PRESSURE: 120 MMHG | RESPIRATION RATE: 16 BRPM | TEMPERATURE: 97.8 F | OXYGEN SATURATION: 99 %

## 2023-03-16 DIAGNOSIS — M47.816 LUMBAR FACET ARTHROPATHY: ICD-10-CM

## 2023-03-16 PROCEDURE — 64493 INJ PARAVERT F JNT L/S 1 LEV: CPT | Mod: RT | Performed by: PAIN MEDICINE

## 2023-03-16 PROCEDURE — 64494 INJ PARAVERT F JNT L/S 2 LEV: CPT | Mod: RT | Performed by: PAIN MEDICINE

## 2023-03-16 RX ORDER — BUPIVACAINE HYDROCHLORIDE 5 MG/ML
INJECTION, SOLUTION EPIDURAL; INTRACAUDAL
Status: COMPLETED | OUTPATIENT
Start: 2023-03-16 | End: 2023-03-16

## 2023-03-16 RX ORDER — LIDOCAINE HYDROCHLORIDE 10 MG/ML
INJECTION, SOLUTION EPIDURAL; INFILTRATION; INTRACAUDAL; PERINEURAL
Status: COMPLETED | OUTPATIENT
Start: 2023-03-16 | End: 2023-03-16

## 2023-03-16 RX ADMIN — BUPIVACAINE HYDROCHLORIDE 1.5 ML: 5 INJECTION, SOLUTION EPIDURAL; INTRACAUDAL at 11:44

## 2023-03-16 RX ADMIN — LIDOCAINE HYDROCHLORIDE 3 ML: 10 INJECTION, SOLUTION EPIDURAL; INFILTRATION; INTRACAUDAL; PERINEURAL at 11:44

## 2023-03-16 ASSESSMENT — PAIN SCALES - GENERAL
PAINLEVEL: SEVERE PAIN (7)
PAINLEVEL: MODERATE PAIN (5)

## 2023-03-16 NOTE — PATIENT INSTRUCTIONS
DISCHARGE INSTRUCTIONS    During office hours (8:00 a.m.- 4:00 p.m.) questions or concerns may be answered  by calling Spine Center Navigation Nurses at  [phone].  Messages received after hours will be returned the following business day.      In the case of an emergency, please dial 911 or seek assistance at the nearest Emergency Room/Urgent Care facility.     All Patients:  You may experience an increase in your symptoms for the first 2 days, once the numbing medication wears off.    You may resume your regular medication, no pain medication until you have completed your diary    You may shower. No swimming, tub bath or hot tub for 24 hours; remove bandage after 4 hours    Continue your activities that can cause you pain to test the blocks.                         You should not drive for the next 3 to 5 hours (have someone drive you)           POSSIBLE PROCEDURE SIDE EFFECTS  -Call Spine Center if concerned-    Increased Pain  Increased numbness/tingling     Nausea/Vomiting  Bruising/bleeding at site (hematoma)             Swelling at site (edema) Headache  Difficulty walking  Infection        Fever greater than 100.5    Please complete your pain diary and return the diary to the Spine Center at your earliest convenience.  The Spine Center will contact you to schedule your next appointment after your pain diary is reviewed by your doctor.  Thank you.

## 2023-03-17 ENCOUNTER — TELEPHONE (OUTPATIENT)
Dept: PHYSICAL MEDICINE AND REHAB | Facility: CLINIC | Age: 73
End: 2023-03-17
Payer: COMMERCIAL

## 2023-03-17 DIAGNOSIS — M54.16 LUMBAR RADICULOPATHY: Primary | ICD-10-CM

## 2023-03-17 NOTE — TELEPHONE ENCOUNTER
"Per Dr. Mccain: \"I reviewed the patient's pain diary.  She did not have a sufficient response to proceed with confirmatory medial branch blocks.     I would recommend a right L4-5 TFESI.  She can schedule follow-up prior to the injection to discuss this further if she wishes.\"    LVM for patient providing my number as well as [facility] number to discuss Dr. Mccain recommendation.      "

## 2023-03-17 NOTE — TELEPHONE ENCOUNTER
Pt returned call. Recommendations given and pt stated understanding. Injection requirements reviewed. Order in place. Transferred pt to scheduling to make appt.

## 2023-03-31 ENCOUNTER — OFFICE VISIT (OUTPATIENT)
Dept: PODIATRY | Facility: CLINIC | Age: 73
End: 2023-03-31
Payer: COMMERCIAL

## 2023-03-31 VITALS — BODY MASS INDEX: 22.14 KG/M2 | SYSTOLIC BLOOD PRESSURE: 118 MMHG | WEIGHT: 131 LBS | DIASTOLIC BLOOD PRESSURE: 68 MMHG

## 2023-03-31 DIAGNOSIS — M79.674 TOE PAIN, RIGHT: ICD-10-CM

## 2023-03-31 DIAGNOSIS — L84 CORN OF TOE: ICD-10-CM

## 2023-03-31 DIAGNOSIS — L97.511 ULCER OF RIGHT SECOND TOE, LIMITED TO BREAKDOWN OF SKIN (H): Primary | ICD-10-CM

## 2023-03-31 PROCEDURE — 99213 OFFICE O/P EST LOW 20 MIN: CPT | Performed by: PODIATRIST

## 2023-03-31 NOTE — LETTER
"    3/31/2023         RE: Mily Hilliard  [address] [place] [address]        Dear Colleague,    Thank you for referring your patient, Mily Hilliard, to the [facility] PODIATRY. Please see a copy of my visit note below.    ASSESSMENT:  Encounter Diagnoses   Name Primary?     Ulcer of right second toe, limited to breakdown of skin (H) Yes     Corn of toe      Toe pain, right      MEDICAL DECISION MAKING:  Clinical exam is consistent with an interdigital corn that has ulcerated to the depth of deeper skin, medial right second toe.  No clinical signs of infection  There is bony widening of this toe consistent with old injury and likely posttraumatic arthritis.  This, along with the second toe being pushed upwards immediately into the hallux contribute to the corn and ulceration.    Recommendations discussed:  Daily wound cares were reviewed including gently cleansing, applying a topical antibiotic and light dressing.  We discussed ways to offload the skin in this region, including to foam, donut padding, toe spacer.  She is advised to avoid tight footwear.  She is instructed to monitor for clinical signs of infection.    Of asked her to follow-up in 3 to 4 weeks.  The problem is persisting or worsening, will consider imaging.  Surgical intervention in the form of arthroplasty or total amputation is reasonable.    Disclaimer: This note consists of symbols derived from keyboarding, dictation and/or voice recognition software. As a result, there may be errors in the script that have gone undetected. Please consider this when interpreting information found in this chart.    Phong Mckeon DPM, FACFAS, MS    [place] [facility] of Podiatry/Foot & Ankle Surgery      ____________________________________________________________________    HPI:       Mily presents for a cold sore on toe that will not heal.\"    She refers to the right second toe.  The problem has existed for " months.  Daily burning pain  She has been placing a bandage on the toe as well as bacitracin and using a corn remover.    She recently presented to the emergency room  History of right second toe fracture or injury.  Exercise activities involve walking and yoga    Past Medical History:   Diagnosis Date     Arthritis      Breast cancer (H) 2000    right breast     Hypercholesterolemia      Hypertension      Hypothyroidism    *  *  Past Surgical History:   Procedure Laterality Date     Bilateral Foot surgery  1976     COLONOSCOPY  12/16/2014    Dr. Naylor [facility]     COLONOSCOPY N/A 12/4/2019    Procedure: COLONOSCOPY;  Surgeon: Catrachito Naylor MD;  Location:  GI     EYE SURGERY      cataract removal, macular hole repair     LUMPECTOMY BREAST  2000    Right   *  *  Current Outpatient Medications   Medication Sig Dispense Refill     alendronate (FOSAMAX) 70 MG tablet Take 1 tablet (70 mg) by mouth every 7 days 12 tablet 4     amLODIPine (NORVASC) 5 MG tablet Take 1 tablet (5 mg) by mouth daily 90 tablet 3     aspirin (ASA) 81 MG chewable tablet Take 81 mg by mouth daily       atorvastatin (LIPITOR) 20 MG tablet Take 1 tablet (20 mg) by mouth once daily 90 tablet 0     calcium acetate (PHOSLO) 667 MG CAPS capsule Take 667 mg by mouth daily       cyclobenzaprine (FLEXERIL) 5 MG tablet Take 1 tablet (5 mg) by mouth 3 times daily as needed for muscle spasms 30 tablet 0     levothyroxine (SYNTHROID/LEVOTHROID) 75 MCG tablet Take 1 tablet (75 mcg) by mouth once daily 90 tablet 1     lisinopril (ZESTRIL) 40 MG tablet Take 1 tablet (40 mg) by mouth daily 90 tablet 3     Multiple Vitamins-Minerals (MULTIVITAMIN PO)        primidone (MYSOLINE) 250 MG tablet Take 1 tablet (250 mg) by mouth At Bedtime 90 tablet 1     gabapentin (NEURONTIN) 100 MG capsule 100mg at bedtime x 3 days, then may increase to 200mg x 3 days, then may increase to 300mg at bedtime 90 capsule 1     Vitamin D, Cholecalciferol, 25 MCG (1000 UT) TABS             EXAM:    Vitals: /68   Wt 59.4 kg (131 lb)   BMI 22.14 kg/m    BMI: Body mass index is 22.14 kg/m .    Constitutional:  Mily Hilliard is in no apparent distress, appears well-nourished.  Cooperative with history and physical exam.    Vascular:  Pedal pulses are palpable for both the DP and PT arteries.  CFT < 3 sec.  No edema.      Neuro: Light touch sensation is intact to the L4, L5, S1 distributions  No evidence of weakness, spasticity, or contracture in the lower extremities.     Derm: There is some hyperkeratotic skin on the medial aspect of the right second toe.   This is near the level of the proximal interphalangeal joint where there is bony widening.  Localized erythema and evidence of dried blood.  Superficial ulceration to the depth of deeper skin.    Musculoskeletal:    Lower extremity muscle strength is normal.  Pes planus.  Medial deviation of the right third, fourth and fifth toes.  The second toe is crowded by neighboring toes and elevating in the sagittal plane.  It is pushed upwards and medially into the hallux.          Again, thank you for allowing me to participate in the care of your patient.        Sincerely,        Phong Mckeon DPM

## 2023-03-31 NOTE — PATIENT INSTRUCTIONS
Thank you for choosing [facility] Podiatry / Foot & Ankle Surgery!    DR. CLIFFORD'S CLINIC LOCATIONS:     [facility] TRIAGE LINE: [phone]   [address] APPOINTMENTS: [phone]   [place] [address] RADIOLOGY: [phone]   (Every other Tues - Wed - Fri PM) SET UP SURGERY: [phone]    PHYSICAL THERAPY: [phone]   [facility] SPECIALTY BILLING QUESTIONS: [phone] [id number] FAX: [phone]   [place] [address]    (Thurs & Fri AM)         FOLLOW UP : 3-4 WEEKS    Wound Care Recommendations:    1)  Keep the wound covered by a bandage when bathing.    2)  Gently clean the wound with soap water, separate from bath/shower water.      3)  Each day, apply a topical antibiotic ointment to the wound (Neosporin, Triple antibiotic, Bacitracin).   Cover with large band-aid or gauze.      5)  Please seek immediate medical attention if any increasing redness, drainage, smell, or pain related to the wound.     6)  Please return to clinic in the period of time requested by Dr. Clifford.         [email or web address] or call [email or web address]  TOE SPACERS       TUBE FOAM OR DONUT PAD

## 2023-03-31 NOTE — PROGRESS NOTES
"ASSESSMENT:  Encounter Diagnoses   Name Primary?     Ulcer of right second toe, limited to breakdown of skin (H) Yes     Corn of toe      Toe pain, right      MEDICAL DECISION MAKING:  Clinical exam is consistent with an interdigital corn that has ulcerated to the depth of deeper skin, medial right second toe.  No clinical signs of infection  There is bony widening of this toe consistent with old injury and likely posttraumatic arthritis.  This, along with the second toe being pushed upwards immediately into the hallux contribute to the corn and ulceration.    Recommendations discussed:  Daily wound cares were reviewed including gently cleansing, applying a topical antibiotic and light dressing.  We discussed ways to offload the skin in this region, including to foam, donut padding, toe spacer.  She is advised to avoid tight footwear.  She is instructed to monitor for clinical signs of infection.    Of asked her to follow-up in 3 to 4 weeks.  The problem is persisting or worsening, will consider imaging.  Surgical intervention in the form of arthroplasty or total amputation is reasonable.    Disclaimer: This note consists of symbols derived from keyboarding, dictation and/or voice recognition software. As a result, there may be errors in the script that have gone undetected. Please consider this when interpreting information found in this chart.    Phong Mckeon DPM, FACFAS, MS    [place] Department of Podiatry/Foot & Ankle Surgery      ____________________________________________________________________    HPI:       Mily presents for a cold sore on toe that will not heal.\"    She refers to the right second toe.  The problem has existed for months.  Daily burning pain  She has been placing a bandage on the toe as well as bacitracin and using a corn remover.    She recently presented to the emergency room  History of right second toe fracture or injury.  Exercise activities involve walking and yoga    Past " Medical History:   Diagnosis Date     Arthritis      Breast cancer (H) 2000    right breast     Hypercholesterolemia      Hypertension      Hypothyroidism    *  *  Past Surgical History:   Procedure Laterality Date     Bilateral Foot surgery  1976     COLONOSCOPY  12/16/2014    Dr. Naylor [facility]     COLONOSCOPY N/A 12/4/2019    Procedure: COLONOSCOPY;  Surgeon: Catrachito Naylor MD;  Location:  GI     EYE SURGERY      cataract removal, macular hole repair     LUMPECTOMY BREAST  2000    Right   *  *  Current Outpatient Medications   Medication Sig Dispense Refill     alendronate (FOSAMAX) 70 MG tablet Take 1 tablet (70 mg) by mouth every 7 days 12 tablet 4     amLODIPine (NORVASC) 5 MG tablet Take 1 tablet (5 mg) by mouth daily 90 tablet 3     aspirin (ASA) 81 MG chewable tablet Take 81 mg by mouth daily       atorvastatin (LIPITOR) 20 MG tablet Take 1 tablet (20 mg) by mouth once daily 90 tablet 0     calcium acetate (PHOSLO) 667 MG CAPS capsule Take 667 mg by mouth daily       cyclobenzaprine (FLEXERIL) 5 MG tablet Take 1 tablet (5 mg) by mouth 3 times daily as needed for muscle spasms 30 tablet 0     levothyroxine (SYNTHROID/LEVOTHROID) 75 MCG tablet Take 1 tablet (75 mcg) by mouth once daily 90 tablet 1     lisinopril (ZESTRIL) 40 MG tablet Take 1 tablet (40 mg) by mouth daily 90 tablet 3     Multiple Vitamins-Minerals (MULTIVITAMIN PO)        primidone (MYSOLINE) 250 MG tablet Take 1 tablet (250 mg) by mouth At Bedtime 90 tablet 1     gabapentin (NEURONTIN) 100 MG capsule 100mg at bedtime x 3 days, then may increase to 200mg x 3 days, then may increase to 300mg at bedtime 90 capsule 1     Vitamin D, Cholecalciferol, 25 MCG (1000 UT) TABS            EXAM:    Vitals: /68   Wt 59.4 kg (131 lb)   BMI 22.14 kg/m    BMI: Body mass index is 22.14 kg/m .    Constitutional:  Mily Hilliard is in no apparent distress, appears well-nourished.  Cooperative with history and physical exam.    Vascular:  Pedal  pulses are palpable for both the DP and PT arteries.  CFT < 3 sec.  No edema.      Neuro: Light touch sensation is intact to the L4, L5, S1 distributions  No evidence of weakness, spasticity, or contracture in the lower extremities.     Derm: There is some hyperkeratotic skin on the medial aspect of the right second toe.   This is near the level of the proximal interphalangeal joint where there is bony widening.  Localized erythema and evidence of dried blood.  Superficial ulceration to the depth of deeper skin.    Musculoskeletal:    Lower extremity muscle strength is normal.  Pes planus.  Medial deviation of the right third, fourth and fifth toes.  The second toe is crowded by neighboring toes and elevating in the sagittal plane.  It is pushed upwards and medially into the hallux.

## 2023-04-06 ENCOUNTER — RADIOLOGY INJECTION OFFICE VISIT (OUTPATIENT)
Dept: PHYSICAL MEDICINE AND REHAB | Facility: CLINIC | Age: 73
End: 2023-04-06
Attending: PHYSICAL MEDICINE & REHABILITATION
Payer: COMMERCIAL

## 2023-04-06 VITALS
RESPIRATION RATE: 16 BRPM | OXYGEN SATURATION: 98 % | DIASTOLIC BLOOD PRESSURE: 66 MMHG | HEART RATE: 52 BPM | SYSTOLIC BLOOD PRESSURE: 152 MMHG | TEMPERATURE: 97.7 F

## 2023-04-06 DIAGNOSIS — M54.16 LUMBAR RADICULOPATHY: ICD-10-CM

## 2023-04-06 PROCEDURE — 64483 NJX AA&/STRD TFRM EPI L/S 1: CPT | Mod: RT | Performed by: PAIN MEDICINE

## 2023-04-06 RX ORDER — LIDOCAINE HYDROCHLORIDE 10 MG/ML
INJECTION, SOLUTION EPIDURAL; INFILTRATION; INTRACAUDAL; PERINEURAL
Status: COMPLETED | OUTPATIENT
Start: 2023-04-06 | End: 2023-04-06

## 2023-04-06 RX ORDER — DEXAMETHASONE SODIUM PHOSPHATE 10 MG/ML
INJECTION, SOLUTION INTRAMUSCULAR; INTRAVENOUS
Status: COMPLETED | OUTPATIENT
Start: 2023-04-06 | End: 2023-04-06

## 2023-04-06 RX ADMIN — DEXAMETHASONE SODIUM PHOSPHATE 10 MG: 10 INJECTION, SOLUTION INTRAMUSCULAR; INTRAVENOUS at 10:49

## 2023-04-06 RX ADMIN — LIDOCAINE HYDROCHLORIDE 2 ML: 10 INJECTION, SOLUTION EPIDURAL; INFILTRATION; INTRACAUDAL; PERINEURAL at 10:48

## 2023-04-06 ASSESSMENT — PAIN SCALES - GENERAL
PAINLEVEL: NO PAIN (0)
PAINLEVEL: SEVERE PAIN (6)

## 2023-04-06 NOTE — PATIENT INSTRUCTIONS
Follow-up visit with Dr. Mccain in 2-4 weeks to discuss injection outcome and determine care plan going forward.       DISCHARGE INSTRUCTIONS    During office hours (8:00 a.m.- 4:00 p.m.) questions or concerns may be answered  by calling Spine Center Navigation Nurses at  [phone].  Messages received after hours will be returned the following business day.      In the case of an emergency, please dial 911 or seek assistance at the nearest Emergency Room/Urgent Care facility.     All Patients:    You may experience an increase in your symptoms for the first 2 days (It may take anywhere between 2 days- 2 weeks for the steroid to have maximum effect).    You may use ice on the injection site, as frequently as 20 minutes each hour if needed.    You may take your pain medicine.    You may continue taking your regular medication after your injection. If you have had a Medial Branch Block you may resume pain medication once your pain diary is completed.    You may shower. No swimming, tub bath or hot tub for 48 hours.  You may remove your bandaid/bandage as soon as you are home.    You may resume light activities, as tolerated.    Resume your usual diet as tolerated.    It is strongly advised that you do not drive for 1-3 hours post injection.    If you have had oral sedation:  Do not drive for 8 hours post injection.      If you have had IV sedation:  Do not drive for 24 hours post injection.  Do not operate hazardous machinery or make important personal/business decisions for 24 hours.      POSSIBLE STEROID SIDE EFFECTS (If steroid/cortisone was used for your procedure)    -If you experience these symptoms, it should only last for a short period    Swelling of the legs              Skin redness (flushing)     Mouth (oral) irritation   Blood sugar (glucose) levels            Sweats                    Mood changes  Headache  Sleeplessness  Weakened immune system for up to 14 days, which could increase the risk of  junaid the COVID-19 virus and/or experiencing more severe symptoms of the disease, if exposed.  Decreased effectiveness of the flu vaccine if given within 2 weeks of the steroid.         POSSIBLE PROCEDURE SIDE EFFECTS  -Call the Spine Center if you are concerned  Increased Pain           Increased numbness/tingling      Nausea/Vomiting          Bruising/bleeding at site      Redness or swelling                                              Difficulty walking      Weakness           Fever greater than 100.5    *In the event of a severe headache after an epidural steroid injection that is relieved by lying down, please call the [facility] to speak with a clinical staff member*

## 2023-05-04 DIAGNOSIS — E78.00 PURE HYPERCHOLESTEROLEMIA: ICD-10-CM

## 2023-05-04 RX ORDER — ATORVASTATIN CALCIUM 20 MG/1
TABLET, FILM COATED ORAL
Qty: 90 TABLET | Refills: 2 | Status: SHIPPED | OUTPATIENT
Start: 2023-05-04 | End: 2024-02-13

## 2023-05-11 PROBLEM — M54.50 CHRONIC RIGHT-SIDED LOW BACK PAIN: Status: RESOLVED | Noted: 2023-02-03 | Resolved: 2023-05-11

## 2023-05-11 PROBLEM — G89.29 CHRONIC RIGHT-SIDED LOW BACK PAIN: Status: RESOLVED | Noted: 2023-02-03 | Resolved: 2023-05-11

## 2023-05-11 PROBLEM — M25.511 SHOULDER PAIN, RIGHT: Status: RESOLVED | Noted: 2023-01-18 | Resolved: 2023-05-11

## 2023-05-11 NOTE — PROGRESS NOTES
Discharge Note    Progress reporting period is from initial evaluation date (please see noted date below) to Mar 13, 2023.  Linked Episodes   Type: Episode: Status: Noted: Resolved: Last update: Updated by:   PHYSICAL THERAPY R shoulder 1/18/2023 Active 1/18/2023  3/13/2023  1:55 PM Roma Calderón, PT      Comments:   PHYSICAL THERAPY back 2/3/2023 Active 2/3/2023  3/13/2023  1:55 PM Roma Calderón, PT      Comments:       Mily failed to follow up and current status is unknown.  Please see information below for last relevant information on current status.  Patient seen for 5 visits.    SUBJECTIVE  Subjective changes noted by patient:  R shoulder is feeling pretty good, back is sore. Wonders if the bridging is causing pain, did it yesterday, more sore today. Scheduled for a L3-5 MBB on 3/16.  .  Current pain level is  .     Previous pain level was  5/10.   Changes in function:  Yes (See Goal flowsheet attached for changes in current functional level)  Adverse reaction to treatment or activity: None    OBJECTIVE  Changes noted in objective findings: AROM R Shldr Flx: 124, Abd: 105, Ext/IR: T12     ASSESSMENT/PLAN  Diagnosis: R shoulder pain (likely frozen shoulder)   Updated problem list and treatment plan:   Pain - HEP  Decreased ROM/flexibility - HEP  Decreased function - HEP  Impaired muscle performance - HEP  STG/LTGs have been met or progress has been made towards goals:  Yes, please see goal flowsheet for most current information  Assessment of Progress: current status is unknown.    Last current status:     Self Management Plans:  HEP  I have re-evaluated this patient and find that the nature, scope, duration and intensity of the therapy is appropriate for the medical condition of the patient.  Mily continues to require the following intervention to meet STG and LTG's:  HEP.    Recommendations:  Discharge with current home program.  Patient to follow up with MD as needed.    Please refer to the daily  flowsheet for treatment today, total treatment time and time spent performing 1:1 timed codes.

## 2023-05-15 ENCOUNTER — OFFICE VISIT (OUTPATIENT)
Dept: PHYSICAL MEDICINE AND REHAB | Facility: CLINIC | Age: 73
End: 2023-05-15
Payer: COMMERCIAL

## 2023-05-15 VITALS — SYSTOLIC BLOOD PRESSURE: 154 MMHG | DIASTOLIC BLOOD PRESSURE: 64 MMHG | HEART RATE: 57 BPM

## 2023-05-15 DIAGNOSIS — M54.50 LUMBAR SPINE PAIN: Primary | ICD-10-CM

## 2023-05-15 DIAGNOSIS — M41.25 OTHER IDIOPATHIC SCOLIOSIS, THORACOLUMBAR REGION: ICD-10-CM

## 2023-05-15 DIAGNOSIS — M43.8X9 SAGITTAL PLANE IMBALANCE: ICD-10-CM

## 2023-05-15 DIAGNOSIS — R09.89 DECREASED PULSES IN FEET: ICD-10-CM

## 2023-05-15 DIAGNOSIS — G62.9 POLYNEUROPATHY: ICD-10-CM

## 2023-05-15 DIAGNOSIS — M47.816 LUMBAR FACET ARTHROPATHY: ICD-10-CM

## 2023-05-15 DIAGNOSIS — M54.6 PAIN IN THORACIC SPINE: ICD-10-CM

## 2023-05-15 PROCEDURE — 99214 OFFICE O/P EST MOD 30 MIN: CPT | Performed by: PHYSICAL MEDICINE & REHABILITATION

## 2023-05-15 ASSESSMENT — PAIN SCALES - GENERAL: PAINLEVEL: SEVERE PAIN (6)

## 2023-05-15 NOTE — PATIENT INSTRUCTIONS
An xray and blood pressure test were   ordered for you today.  You will be contacted by scheduling within 3 days.    If you are not contacted, please call Radiology at [phone].     Schedule an EMG (nerve test) with Dr Mccain

## 2023-05-15 NOTE — LETTER
5/15/2023         RE: Mily Hilliard  [address]  [place] [address]        Dear Colleague,    Thank you for referring your patient, Mily Hilliard, to the [facility] SPINE AND NEUROSURGERY. Please see a copy of my visit note below.    Assessment/Plan:      Mily was seen today for back pain.    Diagnoses and all orders for this visit:    Lumbar spine pain  -     XR Thoracic Spine 2 Views; Future    Pain in thoracic spine  -     XR Thoracic Spine 2 Views; Future    Other idiopathic scoliosis, thoracolumbar region    Lumbar facet arthropathy    Polyneuropathy  -     EMG; Future    Sagittal plane imbalance    Decreased pulses in feet  -     US MYRTLE Doppler with Exercise Bilateral; Future         Assessment: Pleasant 72 year old female with a history of Hypothyroidism,with a history of hypertension, with a history of hypertension, hypothyroidism, breast cancer treated with chemotherapy and subsequent polyneuropathy, hyperlipidemia, osteoporosis with:     1.      Persistent chronic thoracolumbar spine pain and right lumbar spine into the gluteal region.  Likely multifactorial related to right-sided lumbar scoliosis, facet arthropathy, sacroiliac joint pain in setting of sagittal plane imbalance and flatback syndrome.  She has some occasional right anterior knee pain may be related to severe right foraminal stenosis L4-5 but no radicular pain.  No improvement with physical therapy and gabapentin.  Pain remains consistent with right-sided facet arthropathy.  No specific radicular pain.  No improvement with medial branch blocks.  Some minimal benefit following a right L4-5 TFESI.     2.  Bilateral lower extremity paresthesias consistent with peripheral polyneuropathy.  No benefit with gabapentin    3.  Cramping in the lower extremities with poor pulses.         Discussion:    1.  I discussed the diagnosis and treatment options.  We discussed further diagnostics to ensure that we are not missing a  thoracic compression fracture resulting in low back pain and that her pain is truly related to the sagittal plane imbalance and scoliosis.    2.  EMG bilateral lower extremities to evaluate extent of peripheral neuropathy or right L4 radiculopathy.    3.  Thoracic plain films to evaluate for compression fracture.    4.  ABIs to evaluate for vascular component to her leg symptoms.    5.  Follow-up after EMG.  Can consider medial branch blocks right  L 1, 2, 3.      It was our pleasure caring for your patient today, if there any questions or concerns please do not hesitate to contact us.      Subjective:   Patient ID: Mily Hilliard is a 72 year old female.    History of Present Illness:Patient presents with her  today for follow-up of right-sided thoracolumbar pain.  The pain is in the mid lumbar spine through the lower thoracic spine into the right upper gluteal region.  She has some numbness and tingling down the right leg as well in the right foot greater than left but up to the knees also some right leg pain.  Her back pain is worse with standing and prolonged sitting better with walking/movement and lying down.  Pain is a 10/10 at worst 6/10 today 3/10 at best.  She did have lumbar medial branch blocks.  I reviewed pain diary from the patient March 2023.  Pain decreased from 7/10 to a 3/10.  Subsequently he underwent a right L4-5 TFESI.  Following the injection she does report up to 25% improvement in the right gluteal and low back pain but still has significant pain along the mid to upper lumbar spine.  She does have bilateral leg cramping in the calves up into the thighs as well as the numbness and tingling.    Imaging: Lumbar spine MRI personally reviewed showing severe disc height loss L3-4 L2-3 moderate L1-2 L4-5.  Retrolisthesis of L3 on L4 L4 on L5.  Multiple broad-based disc bulges no high-grade central canal stenosis.  Reversal of the lumbar lordotic curve.  Right lateral recess stenosis at  L4-5 mild bilateral lateral recess stenosis L3-4 no high-grade nerve compression.  Significant disc herniation.  There is severe right L4-5 foraminal stenosis    Review of Systems: Pertinent positives: Paresthesias bilaterally in the legs with weakness.  Pertinent negatives:  No bowel or bladder incontinence.  No urinary retention.  No fevers, unintentional weight loss, balance changes, headaches, frequent falling, difficulty swallowing, or coordination difficulties.  All others reviewed are negative.    Past Medical History:   Diagnosis Date     Arthritis      Breast cancer (H) 2000    right breast     Hypercholesterolemia      Hypertension      Hypothyroidism        The following portions of the patient's history were reviewed and updated as appropriate: allergies, current medications, past family history, past medical history, past social history, past surgical history and problem list.           Objective:   Physical Exam:    BP (!) 154/64   Pulse 57   There is no height or weight on file to calculate BMI.      General: Alert and oriented with normal affect. Attention, knowledge, memory, and language are intact. No acute distress.     CV: No lower extremity edema. pulses posterior tibial arteries and dorsalis pedis bilaterally.  Skin: No rashes seen.    Gait:  Nonantalgic.  Right lumbar scoliotic curve with prominent mid to upper lumbar spine spinous processes and paraspinal tissues with hypertonic tissue textures and tenderness.    Sensation is intact to light touch throughout the  lower extremities.  Reflexes are  2+ patellar and 0  Achilles      Manual muscle testing reveals:  Right /Left out of 5     5/5 knee flexors  5/5 knee extensors  5/5 ankle plantar flexors  5/5 ankle dorsiflexors  5/5  EHL       Again, thank you for allowing me to participate in the care of your patient.        Sincerely,        Serafin Mccain DO

## 2023-05-15 NOTE — PROGRESS NOTES
Assessment/Plan:      Mily was seen today for back pain.    Diagnoses and all orders for this visit:    Lumbar spine pain  -     XR Thoracic Spine 2 Views; Future    Pain in thoracic spine  -     XR Thoracic Spine 2 Views; Future    Other idiopathic scoliosis, thoracolumbar region    Lumbar facet arthropathy    Polyneuropathy  -     EMG; Future    Sagittal plane imbalance    Decreased pulses in feet  -     US MYRTLE Doppler with Exercise Bilateral; Future         Assessment: Pleasant 72 year old female with a history of Hypothyroidism,with a history of hypertension, with a history of hypertension, hypothyroidism, breast cancer treated with chemotherapy and subsequent polyneuropathy, hyperlipidemia, osteoporosis with:     1.      Persistent chronic thoracolumbar spine pain and right lumbar spine into the gluteal region.  Likely multifactorial related to right-sided lumbar scoliosis, facet arthropathy, sacroiliac joint pain in setting of sagittal plane imbalance and flatback syndrome.  She has some occasional right anterior knee pain may be related to severe right foraminal stenosis L4-5 but no radicular pain.  No improvement with physical therapy and gabapentin.  Pain remains consistent with right-sided facet arthropathy.  No specific radicular pain.  No improvement with medial branch blocks.  Some minimal benefit following a right L4-5 TFESI.     2.  Bilateral lower extremity paresthesias consistent with peripheral polyneuropathy.  No benefit with gabapentin    3.  Cramping in the lower extremities with poor pulses.         Discussion:    1.  I discussed the diagnosis and treatment options.  We discussed further diagnostics to ensure that we are not missing a thoracic compression fracture resulting in low back pain and that her pain is truly related to the sagittal plane imbalance and scoliosis.    2.  EMG bilateral lower extremities to evaluate extent of peripheral neuropathy or right L4 radiculopathy.    3.   Thoracic plain films to evaluate for compression fracture.    4.  ABIs to evaluate for vascular component to her leg symptoms.    5.  Follow-up after EMG.  Can consider medial branch blocks right  L 1, 2, 3.      It was our pleasure caring for your patient today, if there any questions or concerns please do not hesitate to contact us.      Subjective:   Patient ID: Mily Hilliard is a 72 year old female.    History of Present Illness:Patient presents with her  today for follow-up of right-sided thoracolumbar pain.  The pain is in the mid lumbar spine through the lower thoracic spine into the right upper gluteal region.  She has some numbness and tingling down the right leg as well in the right foot greater than left but up to the knees also some right leg pain.  Her back pain is worse with standing and prolonged sitting better with walking/movement and lying down.  Pain is a 10/10 at worst 6/10 today 3/10 at best.  She did have lumbar medial branch blocks.  I reviewed pain diary from the patient March 2023.  Pain decreased from 7/10 to a 3/10.  Subsequently he underwent a right L4-5 TFESI.  Following the injection she does report up to 25% improvement in the right gluteal and low back pain but still has significant pain along the mid to upper lumbar spine.  She does have bilateral leg cramping in the calves up into the thighs as well as the numbness and tingling.    Imaging: Lumbar spine MRI personally reviewed showing severe disc height loss L3-4 L2-3 moderate L1-2 L4-5.  Retrolisthesis of L3 on L4 L4 on L5.  Multiple broad-based disc bulges no high-grade central canal stenosis.  Reversal of the lumbar lordotic curve.  Right lateral recess stenosis at L4-5 mild bilateral lateral recess stenosis L3-4 no high-grade nerve compression.  Significant disc herniation.  There is severe right L4-5 foraminal stenosis    Review of Systems: Pertinent positives: Paresthesias bilaterally in the legs with weakness.   Pertinent negatives:  No bowel or bladder incontinence.  No urinary retention.  No fevers, unintentional weight loss, balance changes, headaches, frequent falling, difficulty swallowing, or coordination difficulties.  All others reviewed are negative.    Past Medical History:   Diagnosis Date     Arthritis      Breast cancer (H) 2000    right breast     Hypercholesterolemia      Hypertension      Hypothyroidism        The following portions of the patient's history were reviewed and updated as appropriate: allergies, current medications, past family history, past medical history, past social history, past surgical history and problem list.           Objective:   Physical Exam:    BP (!) 154/64   Pulse 57   There is no height or weight on file to calculate BMI.      General: Alert and oriented with normal affect. Attention, knowledge, memory, and language are intact. No acute distress.     CV: No lower extremity edema. pulses posterior tibial arteries and dorsalis pedis bilaterally.  Skin: No rashes seen.    Gait:  Nonantalgic.  Right lumbar scoliotic curve with prominent mid to upper lumbar spine spinous processes and paraspinal tissues with hypertonic tissue textures and tenderness.    Sensation is intact to light touch throughout the  lower extremities.  Reflexes are  2+ patellar and 0  Achilles      Manual muscle testing reveals:  Right /Left out of 5     5/5 knee flexors  5/5 knee extensors  5/5 ankle plantar flexors  5/5 ankle dorsiflexors  5/5  EHL

## 2023-05-23 ENCOUNTER — PATIENT OUTREACH (OUTPATIENT)
Dept: CARE COORDINATION | Facility: CLINIC | Age: 73
End: 2023-05-23
Payer: COMMERCIAL

## 2023-06-01 ENCOUNTER — OFFICE VISIT (OUTPATIENT)
Dept: PHYSICAL MEDICINE AND REHAB | Facility: CLINIC | Age: 73
End: 2023-06-01
Attending: PHYSICAL MEDICINE & REHABILITATION
Payer: COMMERCIAL

## 2023-06-01 VITALS — HEART RATE: 59 BPM | DIASTOLIC BLOOD PRESSURE: 50 MMHG | SYSTOLIC BLOOD PRESSURE: 82 MMHG

## 2023-06-01 DIAGNOSIS — G62.9 POLYNEUROPATHY: ICD-10-CM

## 2023-06-01 PROCEDURE — 95911 NRV CNDJ TEST 9-10 STUDIES: CPT | Performed by: PHYSICAL MEDICINE & REHABILITATION

## 2023-06-01 PROCEDURE — 95886 MUSC TEST DONE W/N TEST COMP: CPT | Performed by: PHYSICAL MEDICINE & REHABILITATION

## 2023-06-01 ASSESSMENT — PAIN SCALES - GENERAL: PAINLEVEL: EXTREME PAIN (9)

## 2023-06-01 NOTE — PROGRESS NOTES
[facility]  [address] [place] [address] [address]  [place] [address]  Office: [phone] Fax: [phone]    Electromyography and Nerve Conduction Study Report        Indication: Patient presents at the request of Dr. Serafin Mccain for a bilateral lower extremity EMG.  She has history of chemotherapy for breast cancer approximately 23 years ago.  She has bilateral foot paresthesias from the toes to the mid shins.  She also has right lumbar spine pain gluteal pain into the right groin.  On exam, she has normal sensation to light touch of the lower extremities with mild atrophy of the AH muscles bilaterally.  She has 1+ patellar 0 Achilles reflexes bilaterally.  Normal muscle strength throughout the major muscle groups of the bilateral lower extremities.      Pt Exam Discussion (Communication Barriers):  Electromyography and nerve conduction testing, including associated discomfort, risks, benefits, and alternatives was discussed with the patient prior to the procedure.  No learning/ communication barriers; patient verbalized understanding of procedure.  Informed consent was obtained.           Pt Assessment:  Testing was successfully completed; patient tolerated testing well.       Blood Thinners: ASA Skin Temperature: Warmed 33.3                   EMG/NCS  results:     Nerve Conduction Studies  Motor Sites      Segment Distal Latency Neg. Amp CV F-Latency F-Estimate Comment   Site  (ms) mV m/s ms ms    Left Fibular (EDB) Motor   Ankle Ankle-EDB 4.0 3.2       Fib Head Fib Head-Ankle 11.4 2.6 36      Knee Knee-Ankle 13.7 2.6 *41      Right Fibular (EDB) Motor   Ankle Ankle-EDB 3.3 2.5       Fib Head Fib Head-Ankle 10.3 1.88 38      Knee Knee-Ankle 13.2 *1.93 *41      Left Tibial (AH) Motor   Ankle Ankle-AH 3.4 4.7       Knee Knee-Ankle 13.0 *3.2 41      Right Tibial (AH) Motor   Ankle Ankle-AH 3.5 3.1  54.7 -    Knee Knee-Ankle 12.9 *2.2 43      Right Ulnar (ADM) Motor   Wrist  3.1 8.4   28.4 -    Bel Elbow Bel Elbow-Wrist 6.4 7.4 59      Ab Elbow Ab Elbow-Wrist 8.6 7.0 59        Sensory Sites      Onset Lat Peak Lat Amp CV Comment   Site (ms) (ms)  V m/s    Right Radial Sensory   Forearm-Wrist 1.65 2.2 *18 61    Left Sural Sensory   B-Ankle *NR *NR *NR *NR    Right Sural Sensory   B-Ankle *NR *NR *NR *NR        NCS Waveforms:    Motor                  F-Wave         Sensory             Electromyography     Side Muscle Nerve Root Ins Act Fibs Psw Amp Dur Poly Recrt Int Pat Comment   Right AntTibialis Dp Br Fibular L4-5 Nml Nml Nml Nml Nml 0 Nml Nml    Right Gastroc Tibial S1-2 Nml Nml Nml Nml Nml 0 Nml Nml    Right Fibularis Long Sup Br Fibular L5-S1 Nml Nml Nml Nml Nml 0 Nml Nml    Right VastusLat Femoral L2-4 Nml Nml Nml Nml Nml 0 Nml Nml    Right Iliopsoas Femoral L2-3 Nml Nml Nml Nml Nml 0 Nml Nml    Right TensorFascLat SupGluteal L4-5, S1 Nml Nml Nml Nml Nml 0 Nml Nml    Left AntTibialis Dp Br Fibular L4-5 Nml Nml Nml Nml Nml 0 Nml Nml    Left Gastroc Tibial S1-2 Nml Nml Nml Nml Nml 0 Nml Nml    Left Fibularis Long Sup Br Fibular L5-S1 Nml Nml Nml Nml Nml 0 Nml Nml    Left VastusLat Femoral L2-4 Nml Nml Nml Nml Nml 0 Nml Nml    Left RectFemoris Femoral L2-4 Nml Nml Nml Nml Nml 0 Nml Nml          Comment NCS: Abnormal study  1.  Absent bilateral sural SNAPs.  2.  Essentially normal bilateral peroneal CMAP's with borderline conduction velocities.  Borderline amplitude on the right to the EDB without amplitude drop or slowing across the fibular neck.  3.  Borderline amplitudes of the bilateral tibial CMAP's to the AH muscle.  This is slightly reduced on the right compared to left with borderline conduction velocities.  4.  Borderline amplitude right radial SNAP  5.  Normal right ulnar CMAP    Comment EMG: Normal study  1.  Normal needle EMG bilateral lower extremities.    Interpretation: Abnormal study: There is electrodiagnostic evidence of:    1.  Electrodiagnostic findings are consistent with a  length-dependent sensorimotor polyneuropathy, predominantly axonal.  Sensory nerve action potentials are affected into the upper extremities and only borderline changes of the lower extremity CMAP's.      2.  There is no electrodiagnostic evidence of lumbosacral radiculopathy, lumbosacral plexopathy, or focal neuropathy in the bilateral lower extremities.    Note: Patient has thoracic spine x-rays scheduled for tomorrow along with ankle-brachial indexes for tomorrow.  She will schedule a follow-up with me my first available and I will reach out via [facility] [employer] after  I have received the results of the diagnostic test.    The testing was completed in its entirety by the physician.      It was our pleasure caring for your patient today, if there any questions or concerns please do not hesitate to contact us.

## 2023-06-01 NOTE — PATIENT INSTRUCTIONS
Please keep your appointment for the thoracic x-ray and blood pressure test on your legs tomorrow.  Please schedule a follow-up for the next available and I will reach out to you after I have the results of your tests with further recommendations.    Thank you for choosing the [facility] for your EMG testing.    The ordering provider will receive your final EMG results within the next few days.  Please follow up with your provider for the results and further treatment recommendations.

## 2023-06-01 NOTE — LETTER
6/1/2023         RE: Mily Hilliard  [address]  [place] [address]        Dear Colleague,    Thank you for referring your patient, Mily Hilliard, to the [facility]. Please see a copy of my visit note below.    [facility]  [address] [place] [address] [address]  [place] [address]  Office: [phone] Fax: [phone]    Electromyography and Nerve Conduction Study Report        Indication: Patient presents at the request of Dr. Serafin Mccain for a bilateral lower extremity EMG.  She has history of chemotherapy for breast cancer approximately 23 years ago.  She has bilateral foot paresthesias from the toes to the mid shins.  She also has right lumbar spine pain gluteal pain into the right groin.  On exam, she has normal sensation to light touch of the lower extremities with mild atrophy of the AH muscles bilaterally.  She has 1+ patellar 0 Achilles reflexes bilaterally.  Normal muscle strength throughout the major muscle groups of the bilateral lower extremities.      Pt Exam Discussion (Communication Barriers):  Electromyography and nerve conduction testing, including associated discomfort, risks, benefits, and alternatives was discussed with the patient prior to the procedure.  No learning/ communication barriers; patient verbalized understanding of procedure.  Informed consent was obtained.           Pt Assessment:  Testing was successfully completed; patient tolerated testing well.       Blood Thinners: ASA Skin Temperature: Warmed 33.3                   EMG/NCS  results:     Nerve Conduction Studies  Motor Sites      Segment Distal Latency Neg. Amp CV F-Latency F-Estimate Comment   Site  (ms) mV m/s ms ms    Left Fibular (EDB) Motor   Ankle Ankle-EDB 4.0 3.2       Fib Head Fib Head-Ankle 11.4 2.6 36      Knee Knee-Ankle 13.7 2.6 *41      Right Fibular (EDB) Motor   Ankle Ankle-EDB 3.3 2.5       Fib Head Fib Head-Ankle 10.3 1.88 38       Knee Knee-Ankle 13.2 *1.93 *41      Left Tibial (AH) Motor   Ankle Ankle-AH 3.4 4.7       Knee Knee-Ankle 13.0 *3.2 41      Right Tibial (AH) Motor   Ankle Ankle-AH 3.5 3.1  54.7 -    Knee Knee-Ankle 12.9 *2.2 43      Right Ulnar (ADM) Motor   Wrist  3.1 8.4  28.4 -    Bel Elbow Bel Elbow-Wrist 6.4 7.4 59      Ab Elbow Ab Elbow-Wrist 8.6 7.0 59        Sensory Sites      Onset Lat Peak Lat Amp CV Comment   Site (ms) (ms)  V m/s    Right Radial Sensory   Forearm-Wrist 1.65 2.2 *18 61    Left Sural Sensory   B-Ankle *NR *NR *NR *NR    Right Sural Sensory   B-Ankle *NR *NR *NR *NR        NCS Waveforms:    Motor                  F-Wave         Sensory             Electromyography     Side Muscle Nerve Root Ins Act Fibs Psw Amp Dur Poly Recrt Int Pat Comment   Right AntTibialis Dp Br Fibular L4-5 Nml Nml Nml Nml Nml 0 Nml Nml    Right Gastroc Tibial S1-2 Nml Nml Nml Nml Nml 0 Nml Nml    Right Fibularis Long Sup Br Fibular L5-S1 Nml Nml Nml Nml Nml 0 Nml Nml    Right VastusLat Femoral L2-4 Nml Nml Nml Nml Nml 0 Nml Nml    Right Iliopsoas Femoral L2-3 Nml Nml Nml Nml Nml 0 Nml Nml    Right TensorFascLat SupGluteal L4-5, S1 Nml Nml Nml Nml Nml 0 Nml Nml    Left AntTibialis Dp Br Fibular L4-5 Nml Nml Nml Nml Nml 0 Nml Nml    Left Gastroc Tibial S1-2 Nml Nml Nml Nml Nml 0 Nml Nml    Left Fibularis Long Sup Br Fibular L5-S1 Nml Nml Nml Nml Nml 0 Nml Nml    Left VastusLat Femoral L2-4 Nml Nml Nml Nml Nml 0 Nml Nml    Left RectFemoris Femoral L2-4 Nml Nml Nml Nml Nml 0 Nml Nml          Comment NCS: Abnormal study  1.  Absent bilateral sural SNAPs.  2.  Essentially normal bilateral peroneal CMAP's with borderline conduction velocities.  Borderline amplitude on the right to the EDB without amplitude drop or slowing across the fibular neck.  3.  Borderline amplitudes of the bilateral tibial CMAP's to the AH muscle.  This is slightly reduced on the right compared to left with borderline conduction velocities.  4.  Borderline  amplitude right radial SNAP  5.  Normal right ulnar CMAP    Comment EMG: Normal study  1.  Normal needle EMG bilateral lower extremities.    Interpretation: Abnormal study: There is electrodiagnostic evidence of:    1.  Electrodiagnostic findings are consistent with a length-dependent sensorimotor polyneuropathy, predominantly axonal.  Sensory nerve action potentials are affected into the upper extremities and only borderline changes of the lower extremity CMAP's.      2.  There is no electrodiagnostic evidence of lumbosacral radiculopathy, lumbosacral plexopathy, or focal neuropathy in the bilateral lower extremities.    Note: Patient has thoracic spine x-rays scheduled for tomorrow along with ankle-brachial indexes for tomorrow.  She will schedule a follow-up with me my first available and I will reach out via [email or web address] after  I have received the results of the diagnostic test.    The testing was completed in its entirety by the physician.      It was our pleasure caring for your patient today, if there any questions or concerns please do not hesitate to contact us.            Again, thank you for allowing me to participate in the care of your patient.        Sincerely,        Serafin Mccain, DO

## 2023-06-02 ENCOUNTER — HOSPITAL ENCOUNTER (OUTPATIENT)
Dept: ULTRASOUND IMAGING | Facility: CLINIC | Age: 73
Discharge: HOME | End: 2023-06-02
Attending: PHYSICAL MEDICINE & REHABILITATION
Payer: COMMERCIAL

## 2023-06-02 ENCOUNTER — HOSPITAL ENCOUNTER (OUTPATIENT)
Dept: GENERAL RADIOLOGY | Facility: CLINIC | Age: 73
Discharge: HOME | End: 2023-06-02
Attending: PHYSICAL MEDICINE & REHABILITATION
Payer: COMMERCIAL

## 2023-06-02 DIAGNOSIS — R09.89 DECREASED PULSES IN FEET: ICD-10-CM

## 2023-06-02 DIAGNOSIS — M54.50 LUMBAR SPINE PAIN: ICD-10-CM

## 2023-06-02 DIAGNOSIS — M54.6 PAIN IN THORACIC SPINE: ICD-10-CM

## 2023-06-02 PROCEDURE — 93924 LWR XTR VASC STDY BILAT: CPT

## 2023-06-02 PROCEDURE — 72072 X-RAY EXAM THORAC SPINE 3VWS: CPT

## 2023-06-08 ENCOUNTER — ANCILLARY PROCEDURE (OUTPATIENT)
Dept: GENERAL RADIOLOGY | Facility: CLINIC | Age: 73
End: 2023-06-08
Attending: PODIATRIST
Payer: COMMERCIAL

## 2023-06-08 ENCOUNTER — OFFICE VISIT (OUTPATIENT)
Dept: PODIATRY | Facility: CLINIC | Age: 73
End: 2023-06-08
Payer: COMMERCIAL

## 2023-06-08 VITALS — SYSTOLIC BLOOD PRESSURE: 92 MMHG | DIASTOLIC BLOOD PRESSURE: 64 MMHG

## 2023-06-08 DIAGNOSIS — L97.511 ULCER OF RIGHT SECOND TOE, LIMITED TO BREAKDOWN OF SKIN (H): Primary | ICD-10-CM

## 2023-06-08 DIAGNOSIS — L97.511 ULCER OF RIGHT SECOND TOE, LIMITED TO BREAKDOWN OF SKIN (H): ICD-10-CM

## 2023-06-08 DIAGNOSIS — L84 CORN OF TOE: ICD-10-CM

## 2023-06-08 DIAGNOSIS — M79.674 TOE PAIN, RIGHT: ICD-10-CM

## 2023-06-08 DIAGNOSIS — M19.071 ARTHRITIS OF JOINT OF LESSER TOE, RIGHT: ICD-10-CM

## 2023-06-08 PROCEDURE — 99213 OFFICE O/P EST LOW 20 MIN: CPT | Performed by: PODIATRIST

## 2023-06-08 PROCEDURE — 73660 X-RAY EXAM OF TOE(S): CPT | Mod: TC | Performed by: RADIOLOGY

## 2023-06-08 NOTE — PROGRESS NOTES
ASSESSMENT:  Encounter Diagnoses   Name Primary?     Ulcer of right second toe, limited to breakdown of skin (H) Yes     Toe pain, right      Arthritis of joint of lesser toe, right      Corn of toe      MEDICAL DECISION MAKING:  The previous ulceration appears healed   A corn persists.  Due to ongoing pain, an x-ray was done.  No erosive or lytic changes seen in the second toe.  There is subluxation, likely related to her reported old injury, at the proximal interphalangeal joint.  This is likely the cause of the bony widening, pain and high pressure.    Recommendations:  Avoidance of tight footwear  Ongoing use of low profile padding  Filing the corn down    We also discussed the option of surgical intervention, if conservative cares do not help keep the ulceration healed and her pain level down.    2 options include arthroplasty and amputation    Follow-up on an as-needed basis    Disclaimer: This note consists of symbols derived from keyboarding, dictation and/or voice recognition software. As a result, there may be errors in the script that have gone undetected. Please consider this when interpreting information found in this chart.    Phong Mckeon DPM, FACFAS, MS    [place] [facility]      ____________________________________________________________________    HPI:       Mily returns with her  for follow-up regarding ulceration of her right second toe.  This was thought to be a corn that ulcerated.  She followed the wound care instructions and has used padding.   Pain persists.   *  Past Medical History:   Diagnosis Date     Arthritis      Breast cancer (H) 2000    right breast     Hypercholesterolemia      Hypertension      Hypothyroidism    *  *  Past Surgical History:   Procedure Laterality Date     Bilateral Foot surgery  1976     COLONOSCOPY  12/16/2014    Dr. Naylor [facility]     COLONOSCOPY N/A 12/4/2019    Procedure: COLONOSCOPY;  Surgeon: Catrachito Naylor MD;   Location:  GI     EYE SURGERY      cataract removal, macular hole repair     LUMPECTOMY BREAST  2000    Right   *  *  Current Outpatient Medications   Medication Sig Dispense Refill     alendronate (FOSAMAX) 70 MG tablet Take 1 tablet (70 mg) by mouth every 7 days 12 tablet 4     amLODIPine (NORVASC) 5 MG tablet Take 1 tablet (5 mg) by mouth daily 90 tablet 3     aspirin (ASA) 81 MG chewable tablet Take 81 mg by mouth daily       atorvastatin (LIPITOR) 20 MG tablet Take 1 tablet (20 mg) by mouth once daily 90 tablet 2     calcium acetate (PHOSLO) 667 MG CAPS capsule Take 667 mg by mouth daily       cyclobenzaprine (FLEXERIL) 5 MG tablet Take 1 tablet (5 mg) by mouth 3 times daily as needed for muscle spasms 30 tablet 0     levothyroxine (SYNTHROID/LEVOTHROID) 75 MCG tablet Take 1 tablet (75 mcg) by mouth once daily 90 tablet 1     lisinopril (ZESTRIL) 40 MG tablet Take 1 tablet (40 mg) by mouth daily 90 tablet 3     Multiple Vitamins-Minerals (MULTIVITAMIN PO)        primidone (MYSOLINE) 250 MG tablet Take 1 tablet (250 mg) by mouth At Bedtime 90 tablet 1     Vitamin D, Cholecalciferol, 25 MCG (1000 UT) TABS            EXAM:    Vitals: BP 92/64   BMI: There is no height or weight on file to calculate BMI.       Vascular:  Pedal pulses are palpable for both the DP and PT arteries.  CFT < 3 sec.  No edema.       Neuro: Light touch sensation is intact to the L4, L5, S1 distributions  No evidence of weakness, spasticity, or contracture in the lower extremities.      Derm: There is some hyperkeratotic skin on the medial aspect of the right second toe.   This is near the level of the proximal interphalangeal joint where there is bony widening.    The previous ulceration appears to have healed.      Musculoskeletal:    Lower extremity muscle strength is normal.  Pes planus.  Medial deviation of the right third, fourth and fifth toes.  The second toe is crowded by neighboring toes and elevating in the sagittal plane.  It  is pushed upwards and medially into the hallux.     X-Ray Findings:  I personally reviewed the right foot toe images.  See comments above.     XR TOE RIGHT G/E 2 VIEWS   6/8/2023 9:32 AM      HISTORY: right second toe pain, ulcer, bony widening around the  proximal interphalangeal joint; Ulcer of right second toe, limited to  breakdown of skin (H); Toe pain, right  COMPARISON: None.                                                                       IMPRESSION: There is osseous demineralization. No acute fracture is  seen. There is arthropathy at the second proximal interphalangeal  joint with dorsal subluxation of the second middle phalanx. No  definite evidence of osteomyelitis radiographically. If there is  persistent concern, MRI would be more sensitive.     MIGUE HIGHTOWER MD

## 2023-06-08 NOTE — LETTER
6/8/2023         RE: Mily Hilliard  [address]  [place] [address]        Dear Colleague,    Thank you for referring your patient, Mily Hilliard, to the [facility] PODIATRY. Please see a copy of my visit note below.    ASSESSMENT:  Encounter Diagnoses   Name Primary?     Ulcer of right second toe, limited to breakdown of skin (H) Yes     Toe pain, right      Arthritis of joint of lesser toe, right      Corn of toe      MEDICAL DECISION MAKING:  The previous ulceration appears healed   A corn persists.  Due to ongoing pain, an x-ray was done.  No erosive or lytic changes seen in the second toe.  There is subluxation, likely related to her reported old injury, at the proximal interphalangeal joint.  This is likely the cause of the bony widening, pain and high pressure.    Recommendations:  Avoidance of tight footwear  Ongoing use of low profile padding  Filing the corn down    We also discussed the option of surgical intervention, if conservative cares do not help keep the ulceration healed and her pain level down.    2 options include arthroplasty and amputation    Follow-up on an as-needed basis    Disclaimer: This note consists of symbols derived from keyboarding, dictation and/or voice recognition software. As a result, there may be errors in the script that have gone undetected. Please consider this when interpreting information found in this chart.    Phong Mckeon DPM, FACFAS, [facility] Department of Podiatry/Foot & Ankle Surgery      ____________________________________________________________________    HPI:       Miyl returns with her  for follow-up regarding ulceration of her right second toe.  This was thought to be a corn that ulcerated.  She followed the wound care instructions and has used padding.   Pain persists.   *  Past Medical History:   Diagnosis Date     Arthritis      Breast cancer (H) 2000    right breast     Hypercholesterolemia       Hypertension      Hypothyroidism    *  *  Past Surgical History:   Procedure Laterality Date     Bilateral Foot surgery  1976     COLONOSCOPY  12/16/2014    Dr. Naylor [facility]     COLONOSCOPY N/A 12/4/2019    Procedure: COLONOSCOPY;  Surgeon: Catrachito Naylor MD;  Location:  GI     EYE SURGERY      cataract removal, macular hole repair     LUMPECTOMY BREAST  2000    Right   *  *  Current Outpatient Medications   Medication Sig Dispense Refill     alendronate (FOSAMAX) 70 MG tablet Take 1 tablet (70 mg) by mouth every 7 days 12 tablet 4     amLODIPine (NORVASC) 5 MG tablet Take 1 tablet (5 mg) by mouth daily 90 tablet 3     aspirin (ASA) 81 MG chewable tablet Take 81 mg by mouth daily       atorvastatin (LIPITOR) 20 MG tablet Take 1 tablet (20 mg) by mouth once daily 90 tablet 2     calcium acetate (PHOSLO) 667 MG CAPS capsule Take 667 mg by mouth daily       cyclobenzaprine (FLEXERIL) 5 MG tablet Take 1 tablet (5 mg) by mouth 3 times daily as needed for muscle spasms 30 tablet 0     levothyroxine (SYNTHROID/LEVOTHROID) 75 MCG tablet Take 1 tablet (75 mcg) by mouth once daily 90 tablet 1     lisinopril (ZESTRIL) 40 MG tablet Take 1 tablet (40 mg) by mouth daily 90 tablet 3     Multiple Vitamins-Minerals (MULTIVITAMIN PO)        primidone (MYSOLINE) 250 MG tablet Take 1 tablet (250 mg) by mouth At Bedtime 90 tablet 1     Vitamin D, Cholecalciferol, 25 MCG (1000 UT) TABS            EXAM:    Vitals: BP 92/64   BMI: There is no height or weight on file to calculate BMI.       Vascular:  Pedal pulses are palpable for both the DP and PT arteries.  CFT < 3 sec.  No edema.       Neuro: Light touch sensation is intact to the L4, L5, S1 distributions  No evidence of weakness, spasticity, or contracture in the lower extremities.      Derm: There is some hyperkeratotic skin on the medial aspect of the right second toe.   This is near the level of the proximal interphalangeal joint where there is bony widening.    The previous  ulceration appears to have healed.      Musculoskeletal:    Lower extremity muscle strength is normal.  Pes planus.  Medial deviation of the right third, fourth and fifth toes.  The second toe is crowded by neighboring toes and elevating in the sagittal plane.  It is pushed upwards and medially into the hallux.     X-Ray Findings:  I personally reviewed the right foot toe images.  See comments above.     XR TOE RIGHT G/E 2 VIEWS   6/8/2023 9:32 AM      HISTORY: right second toe pain, ulcer, bony widening around the  proximal interphalangeal joint; Ulcer of right second toe, limited to  breakdown of skin (H); Toe pain, right  COMPARISON: None.                                                                       IMPRESSION: There is osseous demineralization. No acute fracture is  seen. There is arthropathy at the second proximal interphalangeal  joint with dorsal subluxation of the second middle phalanx. No  definite evidence of osteomyelitis radiographically. If there is  persistent concern, MRI would be more sensitive.     MIGUE HIGHTOWER MD              Again, thank you for allowing me to participate in the care of your patient.        Sincerely,        Phong Mckeon DPM

## 2023-06-26 ENCOUNTER — OFFICE VISIT (OUTPATIENT)
Dept: PHYSICAL MEDICINE AND REHAB | Facility: CLINIC | Age: 73
End: 2023-06-26
Payer: COMMERCIAL

## 2023-06-26 VITALS — SYSTOLIC BLOOD PRESSURE: 150 MMHG | HEART RATE: 54 BPM | DIASTOLIC BLOOD PRESSURE: 67 MMHG

## 2023-06-26 DIAGNOSIS — G62.9 POLYNEUROPATHY: ICD-10-CM

## 2023-06-26 DIAGNOSIS — M43.8X9 SAGITTAL PLANE IMBALANCE: ICD-10-CM

## 2023-06-26 DIAGNOSIS — M41.25 OTHER IDIOPATHIC SCOLIOSIS, THORACOLUMBAR REGION: ICD-10-CM

## 2023-06-26 DIAGNOSIS — M54.50 LUMBAR SPINE PAIN: ICD-10-CM

## 2023-06-26 DIAGNOSIS — M54.6 PAIN IN THORACIC SPINE: Primary | ICD-10-CM

## 2023-06-26 PROCEDURE — 99214 OFFICE O/P EST MOD 30 MIN: CPT | Performed by: PHYSICAL MEDICINE & REHABILITATION

## 2023-06-26 ASSESSMENT — PAIN SCALES - GENERAL: PAINLEVEL: MODERATE PAIN (4)

## 2023-06-26 NOTE — LETTER
6/26/2023         RE: Mily Hilliard  [address]  [place] [address]        Dear Colleague,    Thank you for referring your patient, Mily Hilliard, to the [facility] SPINE AND NEUROSURGERY. Please see a copy of my visit note below.    Assessment/Plan:      Mily was seen today for back pain.    Diagnoses and all orders for this visit:    Pain in thoracic spine    Lumbar spine pain    Other idiopathic scoliosis, thoracolumbar region    Sagittal plane imbalance    Polyneuropathy         Assessment: Pleasant 73 year old female with a history of Hypothyroidism,with a history of hypertension, with a history of hypertension, hypothyroidism, breast cancer treated with chemotherapy and subsequent polyneuropathy, hyperlipidemia, osteoporosis with:     1.  Waxing and waning but improved thoracolumbar spine pain and right lumbar spine into the gluteal region.  Likely multifactorial related to right-sided lumbar scoliosis, facet arthropathy, sacroiliac joint pain in setting of sagittal plane imbalance and flatback syndrome.  She has some occasional right anterior knee pain may be related to severe right foraminal stenosis L4-5 but no radicular pain.  No improvement with physical therapy and gabapentin.  Pain remains consistent with right-sided facet arthropathy.  No specific radicular pain.  No improvement with medial branch blocks.  Some minimal benefit following a right L4-5 TFESI.  Overall she is doing well with her home exercises and being more active.     2.  Bilateral lower extremity paresthesias consistent with peripheral polyneuropathy.  No benefit with gabapentin.  This is persistent but tolerable.     3.  Cramping in the lower extremities likely related to polyneuropathy she has normal ankle-brachial indexes.    Discussion:    1. *We discussed the diagnosis and treatment options.  I reviewed the thoracic plain films showing the flattened thoracic kyphotic curve increased thoracolumbar  kyphotic curve.  We discussed that she is doing fairly well now with exercise and that she should continue with her home exercises.  We discussed options such as surgical evaluation at the [facility] for the scoliosis and she is not interested in surgery.     2.  We discussed medication management for the lower extremity polyneuropathy symptoms such as Lyrica.  Lyrica is a good option for her but she declines for now as she  symptoms are tolerable.    3.  I recommend she continue with home exercise.    4.  I did offer Osteopathic manipulative medicine today but she is doing fairly well and would like to hold off on interventions today.She has a flare of her symptoms can consider OMT.    5.  Follow-up with me as needed.    It was our pleasure caring for your patient today, if there any questions or concerns please do not hesitate to contact us.      Subjective:   Patient ID: Mily Hilliard is a 73 year old female.    History of Present Illness: Patient presents for follow-up of right side of the thoracic and lumbar spine pain and gluteal pain.  She also has numbness and cold sensation in the feet up to the mid tibias.  Symptoms are improved overall.  The thoracic pain is more manageable rated a 6/10 at worst and 4/10 today.  Has had improved activity tolerance such as walking and exercising.  Sitting or standing for too long increases her pain.    She also has numbness and cold sensation in the feet up to the mid tibias and cramping in her legs.  The symptoms are stable and manageable.  EMG has been done since last visit and the results were reviewed with the patient.    EMG: Length-dependent sensorimotor polyneuropathy, predominantly axonal.    Imaging: Plain films of the thoracic spine were personally reviewed along with ABIs.    Ankle-brachial indexes were normal.    Thoracic plain films images personally reviewed along with the report.  This shows stranding of the  thoracic kyphotic curve through the  mid thoracic spine.  Mild rightward scoliosis at the thoracolumbar junction with accentuation of the kyphosis at that level.  Moderate disc height loss T12-L1 and L1-L2 no fractures.    Lumbar spine MRI from January 2023 reviewed as well showing retrolisthesis L4-5 L3-4 severe disc height loss L3-4 moderate L2-3.  No high-grade central stenosis.  There is significant foraminal stenosis on the right at L4-5.    Review of Systems: Pertinent positives: Cold sensation  Paresthesias in the legs. Pertinent negatives: No   weakness.  No bowel or bladder incontinence.  No urinary retention.  No fevers, unintentional weight loss, balance changes, headaches, frequent falling, difficulty swallowing, or coordination difficulties.  All others reviewed are negative.  Past Medical History:   Diagnosis Date     Arthritis      Breast cancer (H) 2000    right breast     Hypercholesterolemia      Hypertension      Hypothyroidism        The following portions of the patient's history were reviewed and updated as appropriate: allergies, current medications, past family history, past medical history, past social history, past surgical history and problem list.           Objective:   Physical Exam:    BP (!) 150/67   Pulse 54   There is no height or weight on file to calculate BMI.      General: Alert and oriented with normal affect. Attention, knowledge, memory, and language are intact. No acute distress.   Eyes: Sclerae are clear.  Respirations: Unlabored. CV: No lower extremity edema.     Gait:  Nonantalgic  Left-sided thoracic scoliotic curve.  Right-sided lower lumbar scoliotic curve.  Sensation is decreased light touch in lower extremities to the mid tibias  Reflexes are      Manual muscle testing reveals:  Right /Left out of 5     5/5 hip flexors  5/5 knee flexors  5/5 knee extensors  5/5 ankle plantar flexors  5/5 ankle dorsiflexors  5/5   ankle evertors      Again, thank you for allowing me to participate in the care of your  patient.        Sincerely,        Serafin Mccain, DO

## 2023-06-26 NOTE — PROGRESS NOTES
Assessment/Plan:      Mily was seen today for back pain.    Diagnoses and all orders for this visit:    Pain in thoracic spine    Lumbar spine pain    Other idiopathic scoliosis, thoracolumbar region    Sagittal plane imbalance    Polyneuropathy         Assessment: Pleasant 73 year old female with a history of Hypothyroidism,with a history of hypertension, with a history of hypertension, hypothyroidism, breast cancer treated with chemotherapy and subsequent polyneuropathy, hyperlipidemia, osteoporosis with:     1.  Waxing and waning but improved thoracolumbar spine pain and right lumbar spine into the gluteal region.  Likely multifactorial related to right-sided lumbar scoliosis, facet arthropathy, sacroiliac joint pain in setting of sagittal plane imbalance and flatback syndrome.  She has some occasional right anterior knee pain may be related to severe right foraminal stenosis L4-5 but no radicular pain.  No improvement with physical therapy and gabapentin.  Pain remains consistent with right-sided facet arthropathy.  No specific radicular pain.  No improvement with medial branch blocks.  Some minimal benefit following a right L4-5 TFESI.  Overall she is doing well with her home exercises and being more active.     2.  Bilateral lower extremity paresthesias consistent with peripheral polyneuropathy.  No benefit with gabapentin.  This is persistent but tolerable.     3.  Cramping in the lower extremities likely related to polyneuropathy she has normal ankle-brachial indexes.    Discussion:    1. *We discussed the diagnosis and treatment options.  I reviewed the thoracic plain films showing the flattened thoracic kyphotic curve increased thoracolumbar kyphotic curve.  We discussed that she is doing fairly well now with exercise and that she should continue with her home exercises.  We discussed options such as surgical evaluation at the [facility] for the scoliosis and she is not interested in surgery.      2.  We discussed medication management for the lower extremity polyneuropathy symptoms such as Lyrica.  Lyrica is a good option for her but she declines for now as she  symptoms are tolerable.    3.  I recommend she continue with home exercise.    4.  I did offer Osteopathic manipulative medicine today but she is doing fairly well and would like to hold off on interventions today.She has a flare of her symptoms can consider OMT.    5.  Follow-up with me as needed.    It was our pleasure caring for your patient today, if there any questions or concerns please do not hesitate to contact us.      Subjective:   Patient ID: Mily Hilliard is a 73 year old female.    History of Present Illness: Patient presents for follow-up of right side of the thoracic and lumbar spine pain and gluteal pain.  She also has numbness and cold sensation in the feet up to the mid tibias.  Symptoms are improved overall.  The thoracic pain is more manageable rated a 6/10 at worst and 4/10 today.  Has had improved activity tolerance such as walking and exercising.  Sitting or standing for too long increases her pain.    She also has numbness and cold sensation in the feet up to the mid tibias and cramping in her legs.  The symptoms are stable and manageable.  EMG has been done since last visit and the results were reviewed with the patient.    EMG: Length-dependent sensorimotor polyneuropathy, predominantly axonal.    Imaging: Plain films of the thoracic spine were personally reviewed along with ABIs.    Ankle-brachial indexes were normal.    Thoracic plain films images personally reviewed along with the report.  This shows stranding of the  thoracic kyphotic curve through the mid thoracic spine.  Mild rightward scoliosis at the thoracolumbar junction with accentuation of the kyphosis at that level.  Moderate disc height loss T12-L1 and L1-L2 no fractures.    Lumbar spine MRI from January 2023 reviewed as well showing retrolisthesis L4-5  L3-4 severe disc height loss L3-4 moderate L2-3.  No high-grade central stenosis.  There is significant foraminal stenosis on the right at L4-5.    Review of Systems: Pertinent positives: Cold sensation  Paresthesias in the legs. Pertinent negatives: No   weakness.  No bowel or bladder incontinence.  No urinary retention.  No fevers, unintentional weight loss, balance changes, headaches, frequent falling, difficulty swallowing, or coordination difficulties.  All others reviewed are negative.  Past Medical History:   Diagnosis Date     Arthritis      Breast cancer (H) 2000    right breast     Hypercholesterolemia      Hypertension      Hypothyroidism        The following portions of the patient's history were reviewed and updated as appropriate: allergies, current medications, past family history, past medical history, past social history, past surgical history and problem list.           Objective:   Physical Exam:    BP (!) 150/67   Pulse 54   There is no height or weight on file to calculate BMI.      General: Alert and oriented with normal affect. Attention, knowledge, memory, and language are intact. No acute distress.   Eyes: Sclerae are clear.  Respirations: Unlabored. CV: No lower extremity edema.     Gait:  Nonantalgic  Left-sided thoracic scoliotic curve.  Right-sided lower lumbar scoliotic curve.  Sensation is decreased light touch in lower extremities to the mid tibias  Reflexes are      Manual muscle testing reveals:  Right /Left out of 5     5/5 hip flexors  5/5 knee flexors  5/5 knee extensors  5/5 ankle plantar flexors  5/5 ankle dorsiflexors  5/5   ankle evertors

## 2023-06-26 NOTE — PATIENT INSTRUCTIONS
Continue with home exercises.  Return for Osteopathic manual medicine if needed.   If your leg symptoms worsen, we can consider a trial of Lyrica.

## 2023-06-28 ENCOUNTER — ANCILLARY PROCEDURE (OUTPATIENT)
Dept: MAMMOGRAPHY | Facility: CLINIC | Age: 73
End: 2023-06-28
Payer: COMMERCIAL

## 2023-06-28 DIAGNOSIS — Z12.31 VISIT FOR SCREENING MAMMOGRAM: ICD-10-CM

## 2023-06-28 PROCEDURE — 77067 SCR MAMMO BI INCL CAD: CPT | Mod: TC | Performed by: RADIOLOGY

## 2023-07-14 ENCOUNTER — TELEPHONE (OUTPATIENT)
Dept: PHYSICAL MEDICINE AND REHAB | Facility: CLINIC | Age: 73
End: 2023-07-14
Payer: COMMERCIAL

## 2023-07-14 DIAGNOSIS — M54.16 LUMBAR RADICULAR PAIN: Primary | ICD-10-CM

## 2023-07-14 DIAGNOSIS — M48.061 FORAMINAL STENOSIS OF LUMBAR REGION: ICD-10-CM

## 2023-07-14 NOTE — TELEPHONE ENCOUNTER
If in retrospect she had significant relief of her symptoms (75-80%) for over two months, I can offer her a repeat injection.      Order placed

## 2023-07-14 NOTE — TELEPHONE ENCOUNTER
" PSP:  Serafin Mccain,    Last clinic visit: 6/26/23 OV; 4/6/23 Right L4-L5 TFESI  Reason for call: Return of back pain; wondering about another injecction  Clinical information:  Reports her lower back on right started to return in the last week or so. Denies radiation into leg. States it is the same as before last injection. States relief took a while, but over all she feels she had significant relief (75%). \"Of all the things I've had done, the shot gave me the most relief. Per office note of 5/15/23, patient was reporting minimal relief 5 weeks post injection.   Advice given to patient: Explained PSP will be notified of the update and request. Patient will be called with his response. She is aware this will be upon his return on Monday. She is fine with a call then.   Provider to address: Please advise      "

## 2023-07-14 NOTE — TELEPHONE ENCOUNTER
Phone call to patient to inform order has been placed for repeat injection. Left message to return call. Will need to review injection requirements upon call back.

## 2023-07-14 NOTE — TELEPHONE ENCOUNTER
Patient returned call. Explained PSP has placed an order for a repeat injection. Patient does confirm she had at least 75% relief for over 2 and 1/2 months. Would like to get the repeat injection.  Injection requirements reviewed in full with patient. Discussed steroids have immunosuppressant properties which could potentially put patient at a higher risk for a worsened course of any infection including COVID. Stated understanding. Transferred to scheduling to make appointment.

## 2023-08-01 ENCOUNTER — PATIENT OUTREACH (OUTPATIENT)
Dept: PEDIATRICS | Facility: CLINIC | Age: 73
End: 2023-08-01
Payer: COMMERCIAL

## 2023-08-01 NOTE — TELEPHONE ENCOUNTER
Patient Quality Outreach Health Maintenance - PAL RN    Summary:    PAL RN contacted pt regarding overdue health maintenance    Patient is due/failing the following:       Topic Date Due    Diptheria Tetanus Pertussis (DTAP/TDAP/TD) Vaccine (4 - Td or Tdap) 04/14/2023       Health Maintenance Due   Topic Date Due    ANNUAL REVIEW OF HM ORDERS  Never done    DTAP/TDAP/TD IMMUNIZATION (4 - Td or Tdap) 04/14/2023       Type of outreach:    Chart review performed, no outreach needed.    - Advised patient if any questions or concerns comes up to call the PAL RN.   - Patient given opportunity to ask questions and at this time there is nothing further needed.     Questions for provider review:    None    Cleopatra, RN  Patient Advocate Liason (PAL)  [facility]  Ph. [phone] / Fax. [phone]

## 2023-08-03 DIAGNOSIS — G25.0 ESSENTIAL TREMOR: ICD-10-CM

## 2023-08-03 RX ORDER — PRIMIDONE 250 MG/1
250 TABLET ORAL AT BEDTIME
Qty: 90 TABLET | Refills: 1 | Status: SHIPPED | OUTPATIENT
Start: 2023-08-03 | End: 2024-01-25

## 2023-08-16 ENCOUNTER — RADIOLOGY INJECTION OFFICE VISIT (OUTPATIENT)
Dept: PHYSICAL MEDICINE AND REHAB | Facility: CLINIC | Age: 73
End: 2023-08-16
Attending: PHYSICAL MEDICINE & REHABILITATION
Payer: COMMERCIAL

## 2023-08-16 VITALS
HEART RATE: 54 BPM | SYSTOLIC BLOOD PRESSURE: 112 MMHG | DIASTOLIC BLOOD PRESSURE: 60 MMHG | RESPIRATION RATE: 16 BRPM | TEMPERATURE: 97.5 F | OXYGEN SATURATION: 96 %

## 2023-08-16 DIAGNOSIS — M54.16 LUMBAR RADICULAR PAIN: ICD-10-CM

## 2023-08-16 DIAGNOSIS — M48.061 FORAMINAL STENOSIS OF LUMBAR REGION: ICD-10-CM

## 2023-08-16 PROCEDURE — 64483 NJX AA&/STRD TFRM EPI L/S 1: CPT | Mod: RT | Performed by: PAIN MEDICINE

## 2023-08-16 RX ORDER — DEXAMETHASONE SODIUM PHOSPHATE 10 MG/ML
INJECTION, SOLUTION INTRAMUSCULAR; INTRAVENOUS
Status: COMPLETED | OUTPATIENT
Start: 2023-08-16 | End: 2023-08-16

## 2023-08-16 RX ORDER — LIDOCAINE HYDROCHLORIDE 10 MG/ML
INJECTION, SOLUTION EPIDURAL; INFILTRATION; INTRACAUDAL; PERINEURAL
Status: COMPLETED | OUTPATIENT
Start: 2023-08-16 | End: 2023-08-16

## 2023-08-16 RX ADMIN — DEXAMETHASONE SODIUM PHOSPHATE 10 MG: 10 INJECTION, SOLUTION INTRAMUSCULAR; INTRAVENOUS at 10:41

## 2023-08-16 RX ADMIN — LIDOCAINE HYDROCHLORIDE 2 ML: 10 INJECTION, SOLUTION EPIDURAL; INFILTRATION; INTRACAUDAL; PERINEURAL at 10:41

## 2023-08-16 ASSESSMENT — PAIN SCALES - GENERAL
PAINLEVEL: SEVERE PAIN (6)
PAINLEVEL: NO PAIN (1)

## 2023-08-16 NOTE — PATIENT INSTRUCTIONS
Follow-up visit with Dr. Mccain in 2-4 weeks if symptoms worsen or fail to improve.  Otherwise, please follow-up on an as-needed basis going forward.       DISCHARGE INSTRUCTIONS    During office hours (8:00 a.m.- 4:00 p.m.) questions or concerns may be answered  by calling [facility] Navigation Nurses at  [phone].  Messages received after hours will be returned the following business day.      In the case of an emergency, please dial 911 or seek assistance at the nearest Emergency Room/Urgent Care facility.     All Patients:    You may experience an increase in your symptoms for the first 2 days (It may take anywhere between 2 days- 2 weeks for the steroid to have maximum effect).    You may use ice on the injection site, as frequently as 20 minutes each hour if needed.    You may take your pain medicine.    You may continue taking your regular medication after your injection. If you have had a Medial Branch Block you may resume pain medication once your pain diary is completed.    You may shower. No swimming, tub bath or hot tub for 48 hours.  You may remove your bandaid/bandage as soon as you are home.    You may resume light activities, as tolerated.    Resume your usual diet as tolerated.    It is strongly advised that you do not drive for 1-3 hours post injection.    If you have had oral sedation:  Do not drive for 8 hours post injection.      If you have had IV sedation:  Do not drive for 24 hours post injection.  Do not operate hazardous machinery or make important personal/business decisions for 24 hours.      POSSIBLE STEROID SIDE EFFECTS (If steroid/cortisone was used for your procedure)    -If you experience these symptoms, it should only last for a short period    Swelling of the legs              Skin redness (flushing)     Mouth (oral) irritation   Blood sugar (glucose) levels            Sweats                    Mood changes  Headache  Sleeplessness  Weakened immune system for up to 14 days,  which could increase the risk of junaid the COVID-19 virus and/or experiencing more severe symptoms of the disease, if exposed.  Decreased effectiveness of the flu vaccine if given within 2 weeks of the steroid.         POSSIBLE PROCEDURE SIDE EFFECTS  -Call the Spine Center if you are concerned  Increased Pain           Increased numbness/tingling      Nausea/Vomiting          Bruising/bleeding at site      Redness or swelling                                              Difficulty walking      Weakness           Fever greater than 100.5    *In the event of a severe headache after an epidural steroid injection that is relieved by lying down, please call the [facility] to speak with a clinical staff member*

## 2023-08-29 DIAGNOSIS — S32.010A COMPRESSION FRACTURE OF L1 VERTEBRA, INITIAL ENCOUNTER (H): ICD-10-CM

## 2023-09-01 RX ORDER — ALENDRONATE SODIUM 70 MG/1
70 TABLET ORAL
Qty: 12 TABLET | Refills: 4 | OUTPATIENT
Start: 2023-09-01

## 2023-09-01 NOTE — TELEPHONE ENCOUNTER
Fosamax rx denied, duplicate sent to requesting pharmacy on 8/30/23     Karen Esqueda, Registered Nurse  [facility]

## 2023-10-28 DIAGNOSIS — E06.3 HYPOTHYROIDISM DUE TO HASHIMOTO'S THYROIDITIS: ICD-10-CM

## 2023-10-30 RX ORDER — LEVOTHYROXINE SODIUM 75 UG/1
TABLET ORAL
Qty: 90 TABLET | Refills: 0 | Status: SHIPPED | OUTPATIENT
Start: 2023-10-30 | End: 2024-02-06

## 2023-11-21 DIAGNOSIS — S32.010A COMPRESSION FRACTURE OF L1 VERTEBRA, INITIAL ENCOUNTER (H): ICD-10-CM

## 2023-11-21 RX ORDER — ALENDRONATE SODIUM 70 MG/1
TABLET ORAL
Qty: 12 TABLET | Refills: 2 | Status: SHIPPED | OUTPATIENT
Start: 2023-11-21 | End: 2024-07-22

## 2023-12-12 ENCOUNTER — PATIENT OUTREACH (OUTPATIENT)
Dept: PEDIATRICS | Facility: CLINIC | Age: 73
End: 2023-12-12
Payer: COMMERCIAL

## 2023-12-12 ENCOUNTER — TELEPHONE (OUTPATIENT)
Dept: PHYSICAL MEDICINE AND REHAB | Facility: CLINIC | Age: 73
End: 2023-12-12
Payer: COMMERCIAL

## 2023-12-12 DIAGNOSIS — M54.16 LUMBAR RADICULAR PAIN: Primary | ICD-10-CM

## 2023-12-12 NOTE — TELEPHONE ENCOUNTER
Given that she had over 2 months of over 70% relief from her epidural and has had physical therapy in the past, recommend repeat right L4-5 TFESI.  This would be the third lumbar epidural this calendar year.  She needs to return to clinic prior to any further injections and I cannot promise that her insurance will approve another injection without being seen.

## 2023-12-12 NOTE — TELEPHONE ENCOUNTER
Call placed to pt with provider's response. Pt stated understanding and would like to proceed. Order placed for Right L4-5 TFESI. Injection requirements reviewed. Transferred to scheduling to make appt.

## 2023-12-12 NOTE — TELEPHONE ENCOUNTER
"PSP:  Dr. Mccain  Last clinic visit:  6/26/23 OV; 8/16/23 Inj  Reason for call: Request for repeat injection  Clinical information:  Call received from pt. Pt had Right L4-5 TFESI on 8/16/23. Pt reports 70% relief of her symptoms for 2 months. Pt states her same pain has returned. \"I think I let it go on to long because now even sitting in the car for 20 minutes at a time it hurts\".  Advice given to patient: Informed pt that provider would be updated with request and she will be called with a response. Detailed message ok upon call back.   Provider to address: Please advise if ok for repeat injs       "

## 2023-12-12 NOTE — TELEPHONE ENCOUNTER
Patient Quality Outreach Health Maintenance - PAL RN    Summary:    PAL RN contacted pt regarding overdue health maintenance    Patient is due/failing the following:       Topic Date Due    Diptheria Tetanus Pertussis (DTAP/TDAP/TD) Vaccine (4 - Td or Tdap) 04/14/2023       Health Maintenance Due   Topic Date Due    ANNUAL REVIEW OF HM ORDERS  Never done    RSV VACCINE (Pregnancy & 60+) (1 - 1-dose 60+ series) Never done    DTAP/TDAP/TD IMMUNIZATION (4 - Td or Tdap) 04/14/2023       Type of outreach:    Has upcoming appointment.    - Advised patient if any questions or concerns comes up to call the PAL RN.   - Patient given opportunity to ask questions and at this time there is nothing further needed.     Questions for provider review:    None                                                                                     Chart routed to none.    YANCI Whelan  Patient Advocate Liason (PAL)  [facility]  Ph. [phone] / Fax. [phone]

## 2023-12-27 ENCOUNTER — RADIOLOGY INJECTION OFFICE VISIT (OUTPATIENT)
Dept: PHYSICAL MEDICINE AND REHAB | Facility: CLINIC | Age: 73
End: 2023-12-27
Attending: PHYSICAL MEDICINE & REHABILITATION
Payer: COMMERCIAL

## 2023-12-27 ENCOUNTER — TELEPHONE (OUTPATIENT)
Dept: PHYSICAL MEDICINE AND REHAB | Facility: CLINIC | Age: 73
End: 2023-12-27

## 2023-12-27 VITALS
DIASTOLIC BLOOD PRESSURE: 60 MMHG | TEMPERATURE: 98 F | OXYGEN SATURATION: 98 % | BODY MASS INDEX: 22.33 KG/M2 | SYSTOLIC BLOOD PRESSURE: 148 MMHG | RESPIRATION RATE: 16 BRPM | HEIGHT: 65 IN | WEIGHT: 134 LBS | HEART RATE: 58 BPM

## 2023-12-27 DIAGNOSIS — M54.16 LUMBAR RADICULOPATHY: Primary | ICD-10-CM

## 2023-12-27 DIAGNOSIS — M54.16 LUMBAR RADICULAR PAIN: ICD-10-CM

## 2023-12-27 PROCEDURE — 64483 NJX AA&/STRD TFRM EPI L/S 1: CPT | Mod: 53 | Performed by: STUDENT IN AN ORGANIZED HEALTH CARE EDUCATION/TRAINING PROGRAM

## 2023-12-27 RX ORDER — LIDOCAINE HYDROCHLORIDE 10 MG/ML
INJECTION, SOLUTION EPIDURAL; INFILTRATION; INTRACAUDAL; PERINEURAL
Status: COMPLETED | OUTPATIENT
Start: 2023-12-27 | End: 2023-12-27

## 2023-12-27 RX ORDER — CEPHALEXIN 500 MG/1
500 CAPSULE ORAL 4 TIMES DAILY
Qty: 14 CAPSULE | Refills: 0 | Status: SHIPPED | OUTPATIENT
Start: 2023-12-27 | End: 2023-12-27

## 2023-12-27 RX ORDER — CEPHALEXIN 500 MG/1
500 CAPSULE ORAL 2 TIMES DAILY
Qty: 14 CAPSULE | Refills: 0 | Status: SHIPPED | OUTPATIENT
Start: 2023-12-27 | End: 2023-12-27

## 2023-12-27 RX ORDER — BUPIVACAINE HYDROCHLORIDE 2.5 MG/ML
INJECTION, SOLUTION EPIDURAL; INFILTRATION; INTRACAUDAL
Status: COMPLETED | OUTPATIENT
Start: 2023-12-27 | End: 2023-12-27

## 2023-12-27 RX ORDER — DEXAMETHASONE SODIUM PHOSPHATE 10 MG/ML
INJECTION, SOLUTION INTRAMUSCULAR; INTRAVENOUS
Status: COMPLETED | OUTPATIENT
Start: 2023-12-27 | End: 2023-12-27

## 2023-12-27 RX ORDER — CEPHALEXIN 500 MG/1
500 CAPSULE ORAL 4 TIMES DAILY
Qty: 28 CAPSULE | Refills: 0 | Status: SHIPPED | OUTPATIENT
Start: 2023-12-27 | End: 2024-01-03

## 2023-12-27 RX ADMIN — LIDOCAINE HYDROCHLORIDE 1 ML: 10 INJECTION, SOLUTION EPIDURAL; INFILTRATION; INTRACAUDAL; PERINEURAL at 15:48

## 2023-12-27 ASSESSMENT — PAIN SCALES - GENERAL: PAINLEVEL: WORST PAIN (10)

## 2023-12-27 NOTE — PATIENT INSTRUCTIONS
DISCHARGE INSTRUCTIONS    During office hours (8:00 a.m.- 4:00 p.m.) questions or concerns may be answered  by calling Spine Center Navigation Nurses at  [phone].  Messages received after hours will be returned the following business day.      In the case of an emergency, please dial 911 or seek assistance at the nearest Emergency Room/Urgent Care facility.            POSSIBLE PROCEDURE SIDE EFFECTS  -Call the Spine Center if you are concerned  Increased Pain           Increased numbness/tingling      Nausea/Vomiting          Bruising/bleeding at site      Redness or swelling                                              Difficulty walking      Weakness           Fever greater than 100.5

## 2024-01-10 ENCOUNTER — RADIOLOGY INJECTION OFFICE VISIT (OUTPATIENT)
Dept: PHYSICAL MEDICINE AND REHAB | Facility: CLINIC | Age: 74
End: 2024-01-10
Attending: PHYSICAL MEDICINE & REHABILITATION
Payer: COMMERCIAL

## 2024-01-10 VITALS
TEMPERATURE: 97.5 F | OXYGEN SATURATION: 97 % | DIASTOLIC BLOOD PRESSURE: 64 MMHG | HEART RATE: 61 BPM | RESPIRATION RATE: 16 BRPM | SYSTOLIC BLOOD PRESSURE: 116 MMHG

## 2024-01-10 DIAGNOSIS — M54.16 LUMBAR RADICULOPATHY: ICD-10-CM

## 2024-01-10 PROCEDURE — 64483 NJX AA&/STRD TFRM EPI L/S 1: CPT | Mod: RT | Performed by: PAIN MEDICINE

## 2024-01-10 RX ORDER — LIDOCAINE HYDROCHLORIDE 10 MG/ML
INJECTION, SOLUTION EPIDURAL; INFILTRATION; INTRACAUDAL; PERINEURAL
Status: COMPLETED | OUTPATIENT
Start: 2024-01-10 | End: 2024-01-10

## 2024-01-10 RX ORDER — DEXAMETHASONE SODIUM PHOSPHATE 10 MG/ML
INJECTION, SOLUTION INTRAMUSCULAR; INTRAVENOUS
Status: COMPLETED | OUTPATIENT
Start: 2024-01-10 | End: 2024-01-10

## 2024-01-10 RX ADMIN — DEXAMETHASONE SODIUM PHOSPHATE 10 MG: 10 INJECTION, SOLUTION INTRAMUSCULAR; INTRAVENOUS at 08:54

## 2024-01-10 RX ADMIN — LIDOCAINE HYDROCHLORIDE 1 ML: 10 INJECTION, SOLUTION EPIDURAL; INFILTRATION; INTRACAUDAL; PERINEURAL at 08:54

## 2024-01-10 ASSESSMENT — PAIN SCALES - GENERAL
PAINLEVEL: MODERATE PAIN (5)
PAINLEVEL: NO PAIN (0)

## 2024-01-10 NOTE — PATIENT INSTRUCTIONS
DISCHARGE INSTRUCTIONS    During office hours (8:00 a.m.- 4:00 p.m.) questions or concerns may be answered  by calling Sp[facility] Center Navigation Nurses at  [phone].  Messages received after hours will be returned the following business day.      In the case of an emergency, please dial 911 or seek assistance at the nearest Emergency Room/Urgent Care facility.     All Patients:    You may experience an increase in your symptoms for the first 2 days (It may take anywhere between 2 days- 2 weeks for the steroid to have maximum effect).    You may use ice on the injection site, as frequently as 20 minutes each hour if needed.    You may take your pain medicine.    You may continue taking your regular medication after your injection. If you have had a Medial Branch Block you may resume pain medication once your pain diary is completed.    You may shower. No swimming, tub bath or hot tub for 48 hours.  You may remove your bandaid/bandage as soon as you are home.    You may resume light activities, as tolerated.    Resume your usual diet as tolerated.    It is strongly advised that you do not drive for 1-3 hours post injection.    If you have had oral sedation:  Do not drive for 8 hours post injection.      If you have had IV sedation:  Do not drive for 24 hours post injection.  Do not operate hazardous machinery or make important personal/business decisions for 24 hours.      POSSIBLE STEROID SIDE EFFECTS (If steroid/cortisone was used for your procedure)    -If you experience these symptoms, it should only last for a short period    Swelling of the legs              Skin redness (flushing)     Mouth (oral) irritation   Blood sugar (glucose) levels            Sweats                    Mood changes  Headache  Sleeplessness  Weakened immune system for up to 14 days, which could increase the risk of junaid the COVID-19 virus and/or experiencing more severe symptoms of the disease, if exposed.  Decreased  effectiveness of the flu vaccine if given within 2 weeks of the steroid.         POSSIBLE PROCEDURE SIDE EFFECTS  -Call the Spine Center if you are concerned  Increased Pain           Increased numbness/tingling      Nausea/Vomiting          Bruising/bleeding at site      Redness or swelling                                              Difficulty walking      Weakness           Fever greater than 100.5    *In the event of a severe headache after an epidural steroid injection that is relieved by lying down, please call the [facility] to speak with a clinical staff member*

## 2024-01-25 DIAGNOSIS — G25.0 ESSENTIAL TREMOR: ICD-10-CM

## 2024-01-25 RX ORDER — PRIMIDONE 250 MG/1
250 TABLET ORAL AT BEDTIME
Qty: 90 TABLET | Refills: 0 | Status: SHIPPED | OUTPATIENT
Start: 2024-01-25 | End: 2024-02-13

## 2024-01-31 ENCOUNTER — OFFICE VISIT (OUTPATIENT)
Dept: PHYSICAL MEDICINE AND REHAB | Facility: CLINIC | Age: 74
End: 2024-01-31
Payer: COMMERCIAL

## 2024-01-31 VITALS — SYSTOLIC BLOOD PRESSURE: 156 MMHG | HEART RATE: 52 BPM | DIASTOLIC BLOOD PRESSURE: 65 MMHG

## 2024-01-31 DIAGNOSIS — M48.061 FORAMINAL STENOSIS OF LUMBAR REGION: ICD-10-CM

## 2024-01-31 DIAGNOSIS — G62.9 POLYNEUROPATHY: ICD-10-CM

## 2024-01-31 DIAGNOSIS — M41.25 OTHER IDIOPATHIC SCOLIOSIS, THORACOLUMBAR REGION: ICD-10-CM

## 2024-01-31 DIAGNOSIS — M54.16 LUMBAR RADICULAR PAIN: Primary | ICD-10-CM

## 2024-01-31 PROCEDURE — 99214 OFFICE O/P EST MOD 30 MIN: CPT | Performed by: PHYSICAL MEDICINE & REHABILITATION

## 2024-01-31 RX ORDER — PREGABALIN 25 MG/1
CAPSULE ORAL
Qty: 60 CAPSULE | Refills: 1 | Status: SHIPPED | OUTPATIENT
Start: 2024-01-31 | End: 2024-03-11

## 2024-01-31 ASSESSMENT — PAIN SCALES - GENERAL: PAINLEVEL: MILD PAIN (3)

## 2024-01-31 NOTE — PATIENT INSTRUCTIONS
An MRI and xray was ordered for you today.  You will be contacted by scheduling within 3 days.    If you are not contacted, please call Radiology at [phone].    Start lyrica 25 mg at bedtime x3-5 days then increase to 25 mg twice daily  See Dr Vigil in Neurosurgery to discuss surgical options

## 2024-01-31 NOTE — PROGRESS NOTES
Assessment/Plan:      Mily was seen today for back pain.    Diagnoses and all orders for this visit:    Lumbar radicular pain  -     MR Lumbar Spine w/o Contrast; Future  -     pregabalin (LYRICA) 25 MG capsule; 25 mg at bedtime x3 days, then increase to 25 mg twice daily  -     Adult Neurosurgery  Referral; Future    Foraminal stenosis of lumbar region  -     MR Lumbar Spine w/o Contrast; Future  -     Adult Neurosurgery  Referral; Future    Other idiopathic scoliosis, thoracolumbar region  -     MR Lumbar Spine w/o Contrast; Future  -     XR Spine Complete Scoliosis 2 Views; Future  -     Adult Neurosurgery  Referral; Future    Polyneuropathy  -     pregabalin (LYRICA) 25 MG capsule; 25 mg at bedtime x3 days, then increase to 25 mg twice daily         Assessment: Pleasant 73 year old female with a history of Hypothyroidism,with a history of hypertension, with a history of hypertension, hypothyroidism, breast cancer treated with chemotherapy and subsequent polyneuropathy, hyperlipidemia, osteoporosis with:     1.  Right lower extremity proximal radicular pain in an L4 distribution.  She has severe right L4-5 foraminal stenosis related to degenerative disc disease and facet arthropathy in the setting of lumbar scoliosis.  Right lumbar radicular pain has been managed with a right L4-5 TFESI x 3 over the past year.  She also has had physical therapy in the past.  EMG has been negative for radiculopathy.  Failed gabapentin.  Taking Tylenol at bedtime.  Best benefit is a right L4-5 TFESI most recently done January 10, 2024.      2.  Thoracolumbar pain likely multifactorial related to right-sided lumbar scoliosis, facet arthropathy, sacroiliac joint pain in setting of sagittal plane imbalance and flatback syndrome.   No improvement with physical therapy and gabapentin.  Pain remains consistent with right-sided facet arthropathy.  No specific radicular pain.  No improvement with medial branch  blocks.  Improvement following right L4-5 TFESI done in August, December 2023 and January 10, 2024.      3.  Bilateral lower extremity paresthesias consistent with peripheral polyneuropathy.  No benefit with gabapentin.  This is persistent but tolerable.  No improvement with gabapentin.     4.  Cramping in the lower extremities likely related to polyneuropathy she has normal ankle-brachial indexes.      Discussion:    1.    We discussed the diagnosis and treatment options.  Her right-sided low back pain is likely proximal radicular in nature related to the foraminal stenosis.  We discussed that she is having good results from L4-5 TFESI but has had 3 in the past year.  We discussed options of epidural intermittently to help manage symptoms, medication changes, further imaging and surgical evaluation.    2.  Recommend updated MRI of the lumbar spine   Evaluate for progressed degenerative changes or foraminal stenosis.    3.  Recommend scoliosis imaging to evaluate for progress scoliosis.    4.  Recommend neurosurgical evaluation with Dr. Vigil with [place] to discuss surgical options to see if she would be a good candidate for surgical intervention.    5.  Trial pregabalin 25 mg at bedtime increasing to twice daily    6.  Follow-up 4 to 6 weeks.    It was our pleasure caring for your patient today, if there any questions or concerns please do not hesitate to contact us.    Over 30 minutes were spent on the date of the encounter performing chart review, patient visit and documentation in addition to any procedure.      Subjective:   Patient ID: Mily Hilliard is a 73 year old female.  The patient presents with her  today for follow-up evaluation of right-sided low back pain gluteal pain.  She has numbness and tingling both legs as well   History of Present Illness: From polyneuropathy which may mask some of her symptoms.  Left leg is generally more paresthesias on the right.  Pain is a 9/10 at worst 3/10  today and at best.  Has had a lumbar epidural in the right L4-5 TFESI since last visit at least 60 to 70% improved in retrospect she always has much more improvement than she initially believes as when the injection wears off her pain becomes severe.  Pain is worse with sitting or driving better with steroid injections also takes Tylenol at bedtime 1000 mg which helps her sleep.  Has had physical therapy in the past without benefit gabapentin also was not helpful.      Imaging: MRI lumbar spine January 2023: Images and report personally reviewed.  Multilevel degenerative changes.  Severe right foraminal stenosis L4-5.  Severe disc at loss L3-4 mild facet arthropathy.  L4-5 mild facet arthropathy resulting in severe right foraminal stenosis moderate bilateral facet arthropathy at L5-S1.  Retrolisthesis of L4 and L5 L3 and L4 mild.  No central stenosis.    Review of Systems: Pertinent positives: Paresthesias in the legs.  Pertinent negatives: No   weakness.  No bowel or bladder incontinence.  No urinary retention.  No fevers, unintentional weight loss, balance changes, headaches, frequent falling, difficulty swallowing, or coordination difficulties.  All others reviewed are negative.    Prior interventions:   1.  Right L4-5 TFESI April 2023,  August 2023, January 2024.    Past Medical History:   Diagnosis Date    Arthritis     Breast cancer (H) 2000    right breast    Hypercholesterolemia     Hypertension     Hypothyroidism        The following portions of the patient's history were reviewed and updated as appropriate: allergies, current medications, past family history, past medical history, past social history, past surgical history and problem list.           Objective:   Physical Exam:    BP (!) 156/65   Pulse 52   There is no height or weight on file to calculate BMI.      General: Alert and oriented with normal affect. Attention, knowledge, memory, and language are intact. No acute distress.   Eyes: Sclerae are  clear.  Respirations: Unlabored.   Gait:  Nonantalgic    Sensation is decreased to light touch left lower extremity.  Reflexes are  1+ patellar and 0 Achilles      Manual muscle testing reveals:  Right /Left out of 5     5/5 hip flexors  5/5 knee flexors  5/5 knee extensors  5/5 ankle plantar flexors  5/5 ankle dorsiflexors  5/5  EHL

## 2024-01-31 NOTE — LETTER
1/31/2024         RE: Mily Hilliard  [address]  [place] [address]        Dear Colleague,    Thank you for referring your patient, Mily Hilliard, to the [facility]. Please see a copy of my visit note below.    Assessment/Plan:      iMly was seen today for back pain.    Diagnoses and all orders for this visit:    Lumbar radicular pain  -     MR Lumbar Spine w/o Contrast; Future  -     pregabalin (LYRICA) 25 MG capsule; 25 mg at bedtime x3 days, then increase to 25 mg twice daily  -     Adult Neurosurgery  Referral; Future    Foraminal stenosis of lumbar region  -     MR Lumbar Spine w/o Contrast; Future  -     Adult Neurosurgery  Referral; Future    Other idiopathic scoliosis, thoracolumbar region  -     MR Lumbar Spine w/o Contrast; Future  -     XR Spine Complete Scoliosis 2 Views; Future  -     Adult Neurosurgery  Referral; Future    Polyneuropathy  -     pregabalin (LYRICA) 25 MG capsule; 25 mg at bedtime x3 days, then increase to 25 mg twice daily         Assessment: Pleasant 73 year old female with a history of Hypothyroidism,with a history of hypertension, with a history of hypertension, hypothyroidism, breast cancer treated with chemotherapy and subsequent polyneuropathy, hyperlipidemia, osteoporosis with:     1.  Right lower extremity proximal radicular pain in an L4 distribution.  She has severe right L4-5 foraminal stenosis related to degenerative disc disease and facet arthropathy in the setting of lumbar scoliosis.  Right lumbar radicular pain has been managed with a right L4-5 TFESI x 3 over the past year.  She also has had physical therapy in the past.  EMG has been negative for radiculopathy.  Failed gabapentin.  Taking Tylenol at bedtime.  Best benefit is a right L4-5 TFESI most recently done January 10, 2024.      2.  Thoracolumbar pain likely multifactorial related to right-sided lumbar scoliosis, facet arthropathy,  sacroiliac joint pain in setting of sagittal plane imbalance and flatback syndrome.   No improvement with physical therapy and gabapentin.  Pain remains consistent with right-sided facet arthropathy.  No specific radicular pain.  No improvement with medial branch blocks.  Improvement following right L4-5 TFESI done in August, December 2023 and January 10, 2024.      3.  Bilateral lower extremity paresthesias consistent with peripheral polyneuropathy.  No benefit with gabapentin.  This is persistent but tolerable.  No improvement with gabapentin.     4.  Cramping in the lower extremities likely related to polyneuropathy she has normal ankle-brachial indexes.      Discussion:    1.    We discussed the diagnosis and treatment options.  Her right-sided low back pain is likely proximal radicular in nature related to the foraminal stenosis.  We discussed that she is having good results from L4-5 TFESI but has had 3 in the past year.  We discussed options of epidural intermittently to help manage symptoms, medication changes, further imaging and surgical evaluation.    2.  Recommend updated MRI of the lumbar spine   Evaluate for progressed degenerative changes or foraminal stenosis.    3.  Recommend scoliosis imaging to evaluate for progress scoliosis.    4.  Recommend neurosurgical evaluation with Dr. Vigil with [place] to discuss surgical options to see if she would be a good candidate for surgical intervention.    5.  Trial pregabalin 25 mg at bedtime increasing to twice daily    6.  Follow-up 4 to 6 weeks.    It was our pleasure caring for your patient today, if there any questions or concerns please do not hesitate to contact us.    Over 30 minutes were spent on the date of the encounter performing chart review, patient visit and documentation in addition to any procedure.      Subjective:   Patient ID: Mily Hilliard is a 73 year old female.  The patient presents with her  today for follow-up evaluation  of right-sided low back pain gluteal pain.  She has numbness and tingling both legs as well   History of Present Illness: From polyneuropathy which may mask some of her symptoms.  Left leg is generally more paresthesias on the right.  Pain is a 9/10 at worst 3/10 today and at best.  Has had a lumbar epidural in the right L4-5 TFESI since last visit at least 60 to 70% improved in retrospect she always has much more improvement than she initially believes as when the injection wears off her pain becomes severe.  Pain is worse with sitting or driving better with steroid injections also takes Tylenol at bedtime 1000 mg which helps her sleep.  Has had physical therapy in the past without benefit gabapentin also was not helpful.      Imaging: MRI lumbar spine January 2023: Images and report personally reviewed.  Multilevel degenerative changes.  Severe right foraminal stenosis L4-5.  Severe disc at loss L3-4 mild facet arthropathy.  L4-5 mild facet arthropathy resulting in severe right foraminal stenosis moderate bilateral facet arthropathy at L5-S1.  Retrolisthesis of L4 and L5 L3 and L4 mild.  No central stenosis.    Review of Systems: Pertinent positives: Paresthesias in the legs.  Pertinent negatives: No   weakness.  No bowel or bladder incontinence.  No urinary retention.  No fevers, unintentional weight loss, balance changes, headaches, frequent falling, difficulty swallowing, or coordination difficulties.  All others reviewed are negative.    Prior interventions:   1.  Right L4-5 TFESI April 2023,  August 2023, January 2024.    Past Medical History:   Diagnosis Date     Arthritis      Breast cancer (H) 2000    right breast     Hypercholesterolemia      Hypertension      Hypothyroidism        The following portions of the patient's history were reviewed and updated as appropriate: allergies, current medications, past family history, past medical history, past social history, past surgical history and problem  list.           Objective:   Physical Exam:    BP (!) 156/65   Pulse 52   There is no height or weight on file to calculate BMI.      General: Alert and oriented with normal affect. Attention, knowledge, memory, and language are intact. No acute distress.   Eyes: Sclerae are clear.  Respirations: Unlabored.   Gait:  Nonantalgic    Sensation is decreased to light touch left lower extremity.  Reflexes are  1+ patellar and 0 Achilles      Manual muscle testing reveals:  Right /Left out of 5     5/5 hip flexors  5/5 knee flexors  5/5 knee extensors  5/5 ankle plantar flexors  5/5 ankle dorsiflexors  5/5  L       Again, thank you for allowing me to participate in the care of your patient.        Sincerely,        Serafin Mccain, DO

## 2024-02-06 ENCOUNTER — TELEPHONE (OUTPATIENT)
Dept: NEUROSURGERY | Facility: CLINIC | Age: 74
End: 2024-02-06
Payer: COMMERCIAL

## 2024-02-06 DIAGNOSIS — E06.3 HYPOTHYROIDISM DUE TO HASHIMOTO'S THYROIDITIS: ICD-10-CM

## 2024-02-06 RX ORDER — LEVOTHYROXINE SODIUM 75 UG/1
TABLET ORAL
Qty: 90 TABLET | Refills: 0 | Status: SHIPPED | OUTPATIENT
Start: 2024-02-06 | End: 2024-02-13

## 2024-02-06 NOTE — TELEPHONE ENCOUNTER
Phone call to patient to clarify appointment information at [facility] on 2/12/24.  No answer at listed number.

## 2024-02-07 ENCOUNTER — TELEPHONE (OUTPATIENT)
Dept: NEUROSURGERY | Facility: CLINIC | Age: 74
End: 2024-02-07
Payer: COMMERCIAL

## 2024-02-07 DIAGNOSIS — M48.061 LUMBAR FORAMINAL STENOSIS: ICD-10-CM

## 2024-02-07 DIAGNOSIS — M41.9 SCOLIOSIS: Primary | ICD-10-CM

## 2024-02-08 ENCOUNTER — OFFICE VISIT (OUTPATIENT)
Dept: PODIATRY | Facility: CLINIC | Age: 74
End: 2024-02-08
Payer: COMMERCIAL

## 2024-02-08 VITALS — DIASTOLIC BLOOD PRESSURE: 69 MMHG | BODY MASS INDEX: 22.65 KG/M2 | SYSTOLIC BLOOD PRESSURE: 115 MMHG | WEIGHT: 134 LBS

## 2024-02-08 DIAGNOSIS — L60.0 INGROWING LEFT GREAT TOENAIL: Primary | ICD-10-CM

## 2024-02-08 DIAGNOSIS — M79.675 GREAT TOE PAIN, LEFT: ICD-10-CM

## 2024-02-08 PROCEDURE — 11730 AVULSION NAIL PLATE SIMPLE 1: CPT | Mod: TA | Performed by: PODIATRIST

## 2024-02-08 NOTE — LETTER
"    2/8/2024         RE: Mily Hilliard  [address] [place] [address]        Dear Colleague,    Thank you for referring your patient, Mily Hilliard, to the [facility] PODIATRY. Please see a copy of my visit note below.    ASSESSMENT:  Encounter Diagnoses   Name Primary?     Ingrowing left great toenail, both edge Yes     Great toe pain, left      MEDICAL DECISION MAKING:  There does not appear to be any infection.    The potential causes and nature of an ingrown toenail were discussed with the patient.  We reviewed the natural history/prognosis of the condition and potential risks if no treatment is provided.      Treatment options discussed included conservative management (oral antibiotics when coexisting infection, soaking of the foot, the use of a toe spacer, adequate width shoes) as well as surgical management (partial or total nail removal).  The pros and cons of both forms of treatment were reviewed.      I discussed the option of permanent removal via chemical matrixectomy. This is a reasonable option when there is no co-existing infection.     Mily Hilliard elected to a partial nail avulsion, medial and lateral edge, left hallux.    Nail Avulsion Procedure, Bilateral edges  (non permanent removal)    The procedure was discussed with Mily Hilliard, including risk of infection, abnormal nail regrowth, and possible need for a future, repeat nail procedure.  Post-procedure home cares were reviewed.  These cares are important for preventing infection and aiding in timely healing.   Verbal and written consent was obtained.   The site was marked and the \"Time Out\" called.     The base of the left great toe was injected with 2 cc of  2% Lidocaine plain.  The toe was then prepped with betadine solution. A tourniquet was placed around the base of the toe for hemostasis.   Next the toe was checked for adequate anesthesia.     The medial and lateral aspect of the nail was " loosened from the nail bed and marginal soft tissue attachments with a blunt instrument. A nail splitter was then used to make a longitudinal cut approximately 2 mm from each nail fold.  It was completed on both sides, atraumatically, under the eponychium with a [place] blade. Next, the medial and lateral nail edges were grasped with a hemostat and removed in total.      The tourniquet was removed.  Bacitracin ointment was applied to the nail bed edges, followed by a compressive dressing. The procedure was tolerated well without complication.     Mily Martinez instructed to watch for, and call if,  increasing redness, drainage, and pain after 2-3 days.  Post procedure instructions provided - handout given.    Disclaimer: This note consists of symbols derived from keyboarding, dictation and/or voice recognition software. As a result, there may be errors in the script that have gone undetected. Please consider this when interpreting information found in this chart.    Phong Mckeon DPM, FACFAS, MS    [place] [facility]le [facility]      ____________________________________________________________________    HPI:       Mily presents today reporting ingrown toenail involving her left great toe.  She has pain along both edges.  She has dealt with this for months.    Throbbing pain rated 7 out of 10 at worst  Pain on a daily basis  Walking 2-4 times a week is her form of exercise  History of a partial nail removal on the right great toe.  *  Past Medical History:   Diagnosis Date     Arthritis      Breast cancer (H) 2000    right breast     Hypercholesterolemia      Hypertension      Hypothyroidism    *  *  Past Surgical History:   Procedure Laterality Date     Bilateral Foot surgery  1976     COLONOSCOPY  12/16/2014    Dr. Naylor [facility]     COLONOSCOPY N/A 12/4/2019    Procedure: COLONOSCOPY;  Surgeon: Catrachito Naylor MD;  Location:  GI     EYE SURGERY      cataract removal, macular hole  repair     LUMPECTOMY BREAST  2000    Right   *  *  Current Outpatient Medications   Medication Sig Dispense Refill     alendronate (FOSAMAX) 70 MG tablet Take 1 tablet (70 mg) by mouth once every 7 days 12 tablet 2     amLODIPine (NORVASC) 5 MG tablet Take 1 tablet (5 mg) by mouth daily 90 tablet 3     aspirin (ASA) 81 MG chewable tablet Take 81 mg by mouth daily       atorvastatin (LIPITOR) 20 MG tablet Take 1 tablet (20 mg) by mouth once daily 90 tablet 2     calcium acetate (PHOSLO) 667 MG CAPS capsule Take 667 mg by mouth daily       levothyroxine (SYNTHROID/LEVOTHROID) 75 MCG tablet TAKE ONE TABLET BY MOUTH ONE TIME DAILY 90 tablet 0     lisinopril (ZESTRIL) 40 MG tablet Take 1 tablet (40 mg) by mouth daily 90 tablet 3     Multiple Vitamins-Minerals (MULTIVITAMIN PO)        pregabalin (LYRICA) 25 MG capsule 25 mg at bedtime x3 days, then increase to 25 mg twice daily 60 capsule 1     primidone (MYSOLINE) 250 MG tablet Take 1 tablet (250 mg) by mouth At Bedtime 90 tablet 0     Vitamin D, Cholecalciferol, 25 MCG (1000 UT) TABS        cyclobenzaprine (FLEXERIL) 5 MG tablet Take 1 tablet (5 mg) by mouth 3 times daily as needed for muscle spasms (Patient not taking: Reported on 1/31/2024) 30 tablet 0         EXAM:    Vitals: /69   Wt 60.8 kg (134 lb)   BMI 22.65 kg/m    BMI: Body mass index is 22.65 kg/m .    Vascular:  Pedal pulses are palpable for both the DP and PT arteries.  CFT < 3 sec.  No edema.      Neuro: Light touch sensation is intact to the L4, L5, S1 distributions  No evidence of weakness, spasticity, or contracture in the lower extremities.     Derm: There is tenderness on palpation to the medial and lateral skin fold of the left hallux.  Light erythema.  The nail is incurvated and mildly thickened.  Lesser digits are medially deviated and the second toe contacts the lateral skin fold of the hallux.  There is also some hyperkeratotic skin at the most distal aspect of the medial skin  fold.            Again, thank you for allowing me to participate in the care of your patient.        Sincerely,        Phong Mckeon DPM

## 2024-02-08 NOTE — TELEPHONE ENCOUNTER
SPINE PATIENTS - NEW PROTOCOL PREVISIT    RECORDS RECEIVED FROM: internal   REASON FOR VISIT: .Scoliosis/Left L4-5 foraminal stenosis    PROVIDER: Dr. Deidre Vigil   DATE OF APPT: 2/15/24   NOTES (FOR ALL VISITS) STATUS DETAILS   OFFICE NOTE from referring provider Internal Dr Serafin Mccain @ [facility] Spine:  1/31/24   EMG REPORT Internal [facility]:  6/1/23   MEDICATION LIST Internal    IMAGING  (FOR ALL VISITS)     MRI (HEAD, NECK, SPINE) Internal [facility]:  MRI Lumbar Spine 2/11/24  MRI Lumbar Spine 1/10/23   XRAY (SPINE) *NEUROSURGERY* Internal [facility]:  XR EOS Total Body 2/15/24  XR Thoracic Spine 6/2/23  XR Lumbar Spine 12/28/22

## 2024-02-08 NOTE — PROGRESS NOTES
"ASSESSMENT:  Encounter Diagnoses   Name Primary?    Ingrowing left great toenail, both edge Yes    Great toe pain, left      MEDICAL DECISION MAKING:  There does not appear to be any infection.    The potential causes and nature of an ingrown toenail were discussed with the patient.  We reviewed the natural history/prognosis of the condition and potential risks if no treatment is provided.      Treatment options discussed included conservative management (oral antibiotics when coexisting infection, soaking of the foot, the use of a toe spacer, adequate width shoes) as well as surgical management (partial or total nail removal).  The pros and cons of both forms of treatment were reviewed.      I discussed the option of permanent removal via chemical matrixectomy. This is a reasonable option when there is no co-existing infection.     Mily Hilliard elected to a partial nail avulsion, medial and lateral edge, left hallux.    Nail Avulsion Procedure, Bilateral edges  (non permanent removal)    The procedure was discussed with Mily Hilliard, including risk of infection, abnormal nail regrowth, and possible need for a future, repeat nail procedure.  Post-procedure home cares were reviewed.  These cares are important for preventing infection and aiding in timely healing.   Verbal and written consent was obtained.   The site was marked and the \"Time Out\" called.     The base of the left great toe was injected with 2 cc of  2% Lidocaine plain.  The toe was then prepped with betadine solution. A tourniquet was placed around the base of the toe for hemostasis.   Next the toe was checked for adequate anesthesia.     The medial and lateral aspect of the nail was loosened from the nail bed and marginal soft tissue attachments with a blunt instrument. A nail splitter was then used to make a longitudinal cut approximately 2 mm from each nail fold.  It was completed on both sides, atraumatically, under the eponychium with a " Ewiiaapaayp blade. Next, the medial and lateral nail edges were grasped with a hemostat and removed in total.      The tourniquet was removed.  Bacitracin ointment was applied to the nail bed edges, followed by a compressive dressing. The procedure was tolerated well without complication.     Mily Martinez instructed to watch for, and call if,  increasing redness, drainage, and pain after 2-3 days.  Post procedure instructions provided - handout given.    Disclaimer: This note consists of symbols derived from keyboarding, dictation and/or voice recognition software. As a result, there may be errors in the script that have gone undetected. Please consider this when interpreting information found in this chart.    Phong Mckeon DPM, FACFAS, MS    [facility] of Podiatry/Foot & Ankle Surgery      ____________________________________________________________________    HPI:       Mily presents today reporting ingrown toenail involving her left great toe.  She has pain along both edges.  She has dealt with this for months.    Throbbing pain rated 7 out of 10 at worst  Pain on a daily basis  Walking 2-4 times a week is her form of exercise  History of a partial nail removal on the right great toe.  *  Past Medical History:   Diagnosis Date    Arthritis     Breast cancer (H) 2000    right breast    Hypercholesterolemia     Hypertension     Hypothyroidism    *  *  Past Surgical History:   Procedure Laterality Date    Bilateral Foot surgery  1976    COLONOSCOPY  12/16/2014    Dr. Naylor [facility]    COLONOSCOPY N/A 12/4/2019    Procedure: COLONOSCOPY;  Surgeon: Catrachito Naylor MD;  Location:  GI    EYE SURGERY      cataract removal, macular hole repair    LUMPECTOMY BREAST  2000    Right   *  *  Current Outpatient Medications   Medication Sig Dispense Refill    alendronate (FOSAMAX) 70 MG tablet Take 1 tablet (70 mg) by mouth once every 7 days 12 tablet 2    amLODIPine (NORVASC) 5 MG tablet Take 1 tablet (5 mg) by  mouth daily 90 tablet 3    aspirin (ASA) 81 MG chewable tablet Take 81 mg by mouth daily      atorvastatin (LIPITOR) 20 MG tablet Take 1 tablet (20 mg) by mouth once daily 90 tablet 2    calcium acetate (PHOSLO) 667 MG CAPS capsule Take 667 mg by mouth daily      levothyroxine (SYNTHROID/LEVOTHROID) 75 MCG tablet TAKE ONE TABLET BY MOUTH ONE TIME DAILY 90 tablet 0    lisinopril (ZESTRIL) 40 MG tablet Take 1 tablet (40 mg) by mouth daily 90 tablet 3    Multiple Vitamins-Minerals (MULTIVITAMIN PO)       pregabalin (LYRICA) 25 MG capsule 25 mg at bedtime x3 days, then increase to 25 mg twice daily 60 capsule 1    primidone (MYSOLINE) 250 MG tablet Take 1 tablet (250 mg) by mouth At Bedtime 90 tablet 0    Vitamin D, Cholecalciferol, 25 MCG (1000 UT) TABS       cyclobenzaprine (FLEXERIL) 5 MG tablet Take 1 tablet (5 mg) by mouth 3 times daily as needed for muscle spasms (Patient not taking: Reported on 1/31/2024) 30 tablet 0         EXAM:    Vitals: /69   Wt 60.8 kg (134 lb)   BMI 22.65 kg/m    BMI: Body mass index is 22.65 kg/m .    Vascular:  Pedal pulses are palpable for both the DP and PT arteries.  CFT < 3 sec.  No edema.      Neuro: Light touch sensation is intact to the L4, L5, S1 distributions  No evidence of weakness, spasticity, or contracture in the lower extremities.     Derm: There is tenderness on palpation to the medial and lateral skin fold of the left hallux.  Light erythema.  The nail is incurvated and mildly thickened.  Lesser digits are medially deviated and the second toe contacts the lateral skin fold of the hallux.  There is also some hyperkeratotic skin at the most distal aspect of the medial skin fold.

## 2024-02-08 NOTE — PATIENT INSTRUCTIONS
Thank you for choosing [facility] Podiatry / Foot & Ankle Surgery!    DR. CLIFFORD'S CLINIC LOCATIONS:     [facility] TRIAGE LINE: [phone]   [address] [place] [address] APPOINTMENTS: [phone]   [place] [address] RADIOLOGY: [phone]   (Every other Tues - Wed - Fri PM) SET UP SURGERY: [phone]    PHYSICAL THERAPY: [phone]   [facility] BILLING QUESTIONS: [phone] [address] FAX: [phone]   [place] [address]    (Thurs & Fri AM)        DR. CLIFFORD'S RECOMMENDATIONS FOR HEALING AFTER A NAIL REMOVAL PROCEDURE    1. Try to keep the bandage on until bedtime or the morning after your procedure.     2. Some bleeding is normal. If bleeding seems excessive to you, place ice on top of your foot for 15-20 minutes, elevate your foot above heart level and reinforce the dressing (add additional covering)    3. Over the counter pain medication (tylenol / ibuprofen), elevating your foot and cold application is all you should need for pain control. Pain is usually easily managed.      4. For 1-2 weeks, soak your foot twice a day in mild, skin friendly soap water solution for 15 minutes (dish soap, hand soap, body wash, etc). After soaking, blot the area dry and apply a regular band aid. An antibiotic ointment is not needed.  If the guaze dressing sticks to your toe, soak your foot for a few minutes with the dressing on. This should help loosen it.     6. It is normal to experience some discomfort and redness around the nail for several days following the procedure. Drainage will likely appear a red and/or yellow. This is normal. If your toe is still draining fluid after 2 weeks, continue soaking.    7. Initial discomfort might last for 2-3 days. You may resume with regular activity as soon as you want,  as long as you keep the wound clean, dry and follow the soaking instruction. It is recommended that you do not enter public swimming pools/hot tubs while your toe is  draining.    8. If you are experiencing worsening pain and redness or notice pus after 2-3 days please contact the clinic. Ask to speak with a triage nurse and they will inform our team of your symptoms and we can advise if a follow up is needed.      IF YOU HAD A PERMANENT NAIL REMOVAL PROCEDURE    - Healing will take longer and you might need to soak for 2-3 weeks. You are healing from the nail being removed and the chemical burn.  - Expect some drainage, crust  and redness. This is from the acid (phenol) that was applied and the chemical burn.  - After soaking, use a Q-tip to clean under and around the skin where the nail was removed. This helps get rid of the brownish material and helps the wound drain  - Sometimes it is hard to know if the redness and drainage is normal versus a developing infection. If in doubt, reach out to Dr. Mckeon's office via My Chart or phone.   - If redness extends back to the middle of the toe, you should have it looked at in clinic.     After 2-3 days, if you are experiencing worsening pain and redness, or notice pus, please contact the clinic. Ask to speak with a triage nurse and they will inform our team of your symptoms and we can advise if a follow up is needed.

## 2024-02-09 SDOH — HEALTH STABILITY: PHYSICAL HEALTH: ON AVERAGE, HOW MANY DAYS PER WEEK DO YOU ENGAGE IN MODERATE TO STRENUOUS EXERCISE (LIKE A BRISK WALK)?: 3 DAYS

## 2024-02-09 SDOH — HEALTH STABILITY: PHYSICAL HEALTH: ON AVERAGE, HOW MANY MINUTES DO YOU ENGAGE IN EXERCISE AT THIS LEVEL?: 30 MIN

## 2024-02-09 ASSESSMENT — SOCIAL DETERMINANTS OF HEALTH (SDOH): HOW OFTEN DO YOU GET TOGETHER WITH FRIENDS OR RELATIVES?: ONCE A WEEK

## 2024-02-11 ENCOUNTER — HOSPITAL ENCOUNTER (OUTPATIENT)
Dept: MRI IMAGING | Facility: CLINIC | Age: 74
Discharge: HOME | End: 2024-02-11
Attending: PHYSICAL MEDICINE & REHABILITATION | Admitting: PHYSICAL MEDICINE & REHABILITATION
Payer: COMMERCIAL

## 2024-02-11 DIAGNOSIS — M54.16 LUMBAR RADICULAR PAIN: ICD-10-CM

## 2024-02-11 DIAGNOSIS — M48.061 FORAMINAL STENOSIS OF LUMBAR REGION: ICD-10-CM

## 2024-02-11 DIAGNOSIS — M41.25 OTHER IDIOPATHIC SCOLIOSIS, THORACOLUMBAR REGION: ICD-10-CM

## 2024-02-11 PROCEDURE — 72148 MRI LUMBAR SPINE W/O DYE: CPT

## 2024-02-13 ENCOUNTER — OFFICE VISIT (OUTPATIENT)
Dept: PEDIATRICS | Facility: CLINIC | Age: 74
End: 2024-02-13
Payer: COMMERCIAL

## 2024-02-13 VITALS
HEART RATE: 59 BPM | OXYGEN SATURATION: 99 % | RESPIRATION RATE: 18 BRPM | SYSTOLIC BLOOD PRESSURE: 124 MMHG | HEIGHT: 64 IN | WEIGHT: 131.1 LBS | BODY MASS INDEX: 22.38 KG/M2 | DIASTOLIC BLOOD PRESSURE: 68 MMHG | TEMPERATURE: 97 F

## 2024-02-13 DIAGNOSIS — E78.00 PURE HYPERCHOLESTEROLEMIA: ICD-10-CM

## 2024-02-13 DIAGNOSIS — G25.0 ESSENTIAL TREMOR: ICD-10-CM

## 2024-02-13 DIAGNOSIS — I10 ESSENTIAL HYPERTENSION: ICD-10-CM

## 2024-02-13 DIAGNOSIS — Z00.00 ENCOUNTER FOR MEDICARE ANNUAL WELLNESS EXAM: Primary | ICD-10-CM

## 2024-02-13 DIAGNOSIS — E06.3 HYPOTHYROIDISM DUE TO HASHIMOTO'S THYROIDITIS: ICD-10-CM

## 2024-02-13 PROBLEM — L97.511: Status: ACTIVE | Noted: 2024-02-13

## 2024-02-13 PROBLEM — L97.511: Status: RESOLVED | Noted: 2024-02-13 | Resolved: 2024-02-13

## 2024-02-13 LAB
ALBUMIN SERPL BCG-MCNC: 4.4 G/DL (ref 3.5–5.2)
ALP SERPL-CCNC: 41 U/L (ref 40–150)
ALT SERPL W P-5'-P-CCNC: 23 U/L (ref 0–50)
ANION GAP SERPL CALCULATED.3IONS-SCNC: 10 MMOL/L (ref 7–15)
AST SERPL W P-5'-P-CCNC: 28 U/L (ref 0–45)
BILIRUB SERPL-MCNC: 0.2 MG/DL
BUN SERPL-MCNC: 19.4 MG/DL (ref 8–23)
CALCIUM SERPL-MCNC: 9.3 MG/DL (ref 8.8–10.2)
CHLORIDE SERPL-SCNC: 103 MMOL/L (ref 98–107)
CHOLEST SERPL-MCNC: 159 MG/DL
CREAT SERPL-MCNC: 0.78 MG/DL (ref 0.51–0.95)
DEPRECATED HCO3 PLAS-SCNC: 25 MMOL/L (ref 22–29)
EGFRCR SERPLBLD CKD-EPI 2021: 79 ML/MIN/1.73M2
FASTING STATUS PATIENT QL REPORTED: NO
GLUCOSE SERPL-MCNC: 82 MG/DL (ref 70–99)
HDLC SERPL-MCNC: 40 MG/DL
LDLC SERPL CALC-MCNC: 94 MG/DL
NONHDLC SERPL-MCNC: 119 MG/DL
POTASSIUM SERPL-SCNC: 4.7 MMOL/L (ref 3.4–5.3)
PROT SERPL-MCNC: 7 G/DL (ref 6.4–8.3)
SODIUM SERPL-SCNC: 138 MMOL/L (ref 135–145)
TRIGL SERPL-MCNC: 127 MG/DL
TSH SERPL DL<=0.005 MIU/L-ACNC: 2.28 UIU/ML (ref 0.3–4.2)

## 2024-02-13 PROCEDURE — 99214 OFFICE O/P EST MOD 30 MIN: CPT | Mod: 25 | Performed by: PHYSICIAN ASSISTANT

## 2024-02-13 PROCEDURE — 80061 LIPID PANEL: CPT | Performed by: PHYSICIAN ASSISTANT

## 2024-02-13 PROCEDURE — G0439 PPPS, SUBSEQ VISIT: HCPCS | Performed by: PHYSICIAN ASSISTANT

## 2024-02-13 PROCEDURE — 36415 COLL VENOUS BLD VENIPUNCTURE: CPT | Performed by: PHYSICIAN ASSISTANT

## 2024-02-13 PROCEDURE — 80053 COMPREHEN METABOLIC PANEL: CPT | Performed by: PHYSICIAN ASSISTANT

## 2024-02-13 PROCEDURE — 84443 ASSAY THYROID STIM HORMONE: CPT | Performed by: PHYSICIAN ASSISTANT

## 2024-02-13 RX ORDER — LEVOTHYROXINE SODIUM 75 UG/1
TABLET ORAL
Qty: 90 TABLET | Refills: 3 | Status: SHIPPED | OUTPATIENT
Start: 2024-02-13

## 2024-02-13 RX ORDER — ATORVASTATIN CALCIUM 20 MG/1
TABLET, FILM COATED ORAL
Qty: 90 TABLET | Refills: 3 | Status: SHIPPED | OUTPATIENT
Start: 2024-02-13

## 2024-02-13 RX ORDER — PRIMIDONE 250 MG/1
250 TABLET ORAL AT BEDTIME
Qty: 90 TABLET | Refills: 3 | Status: SHIPPED | OUTPATIENT
Start: 2024-02-13

## 2024-02-13 RX ORDER — LISINOPRIL 40 MG/1
40 TABLET ORAL DAILY
Qty: 90 TABLET | Refills: 3 | Status: SHIPPED | OUTPATIENT
Start: 2024-02-13

## 2024-02-13 RX ORDER — AMLODIPINE BESYLATE 5 MG/1
5 TABLET ORAL DAILY
Qty: 90 TABLET | Refills: 3 | Status: SHIPPED | OUTPATIENT
Start: 2024-02-13

## 2024-02-13 ASSESSMENT — PAIN SCALES - GENERAL: PAINLEVEL: MODERATE PAIN (5)

## 2024-02-13 NOTE — PATIENT INSTRUCTIONS
"Eating Healthy Foods: Care Instructions  With every meal, you can make healthy food choices. Try to eat a variety of fruits, vegetables, whole grains, lean proteins, and low-fat dairy products. This can help you get the right balance of nutrients, including vitamins and minerals. Small changes add up over time. You can start by adding one healthy food to your meals each day.    Try to make half your plate fruits and vegetables, one-fourth whole grains, and one-fourth lean proteins. Try including dairy with your meals.   Eat more fruits and vegetables. Try to have them with most meals and snacks.   Foods for healthy eating    Fruits    These can be fresh, frozen, canned, or dried.  Try to choose whole fruit rather than fruit juice.  Eat a variety of colors.    Vegetables    These can be fresh, frozen, canned, or dried.  Beans, peas, and lentils count too.    Whole grains    Choose whole-grain breads, cereals, and noodles.  Try brown rice.    Lean proteins    These can include lean meat, poultry, fish, and eggs.  You can also have tofu, beans, peas, lentils, nuts, and seeds.    Dairy    Try milk, yogurt, and cheese.  Choose low-fat or fat-free when you can.  If you need to, use lactose-free milk or fortified plant-based milk products, such as soy milk.    Water    Drink water when you're thirsty.  Limit sugar-sweetened drinks, including soda, fruit drinks, and sports drinks.  Where can you learn more?  Go to https://www.OwnerIQ.net/patiented  Enter T756 in the search box to learn more about \"Eating Healthy Foods: Care Instructions.\"  Current as of: February 28, 2023               Content Version: 13.8    5273-0690 [employer].   Care instructions adapted under license by your healthcare professional. If you have questions about a medical condition or this instruction, always ask your healthcare professional. [employer] disclaims any warranty or liability for your use of this " information.      Nutrition for Older Adults: Care Instructions  Overview     Good nutrition is important at any age. But it is especially important for older adults. Eating a healthy diet helps keep your body strong. And it can help lower your risk for disease.  As you get older, your body needs more of certain nutrients. These include vitamin B12, calcium, and vitamin D. But it may be harder for you to get these and other important nutrients. This could be for many reasons. You may not feel as hungry as you used to. Or you could have problems with your teeth or mouth that make it hard to chew. Or you may not enjoy planning and preparing meals, especially if you live alone.  Talk with your doctor if you want help getting the most nutrition from what you eat. The doctor may have you work with a dietitian to help you plan meals.  Follow-up care is a key part of your treatment and safety. Be sure to make and go to all appointments, and call your doctor if you are having problems. It's also a good idea to know your test results and keep a list of the medicines you take.  How can you care for yourself at home?  To stay healthy  Eat a variety of foods. The more you vary the foods you eat, the more vitamins, minerals, and other nutrients you get.  Take a multivitamin every day. Choose one with about 100% of the daily value (DV) for vitamins and minerals. Do not take more than 100% of the daily value for any vitamin or mineral unless your doctor tells you to. Talk with your doctor if you are not sure which multivitamin is right for you.  Eat lots of fruits and vegetables. Fresh, frozen, or no-salt canned vegetables and fruits in their own juice or light syrup are good choices.  Include foods that are high in vitamin B12 in your diet. Good choices are fortified breakfast cereal, nonfat or low-fat milk and other dairy products, meat, poultry, fish, and eggs.  Get enough calcium and vitamin D. Good choices include nonfat or  low-fat milk, cheese, and yogurt. Other good options are tofu, orange juice with added calcium, and some leafy green vegetables, such as dru greens and kale. If you don't use milk products, talk to your doctor about calcium and vitamin D supplements.  Eat protein foods every day. Good choices include lean meat, fish, poultry, eggs, and cheese. Other good options are cooked beans, peanut butter, and nuts and seeds.  Choose whole grains for half of the grains you eat. Look for 100% whole wheat bread, whole-grain cereals, brown rice, and other whole grains.  If you have constipation  Eat high-fiber foods every day. These include fruits, vegetables, cooked dried beans, and whole grains.  Drink plenty of fluids. If you have kidney, heart, or liver disease and have to limit fluids, talk with your doctor before you increase the amount of fluids you drink.  Ask your doctor if stool softeners may help keep your bowels regular.  If you have mouth problems that make chewing hard  Pick canned or cooked fruits and vegetables. These are often softer.  Chop or shred meat, poultry, and fish. Add sauce or gravy to the meat to help keep it moist.  Pick other protein foods that are soft. These include cheese, peanut butter, cooked beans, cottage cheese, and eggs.  If you have trouble shopping for yourself  Ask a local food store to deliver groceries to your home.  Contact a volunteer center and ask for help.  Ask a family member or neighbor to help you.  If you have trouble preparing meals  If you are able, take a cooking class.  Use a microwave oven to cook TV dinners and other frozen or prepared foods.  Take part in group meal programs. You can find these through senior citizen programs.  Have meals brought to your home. Your community may offer programs that deliver meals, such as Meals on Wheels.  If your appetite is poor  Try eating smaller amounts of food more often. For example, eat 4 or 5 small meals a day instead of 1 or  "2 large meals.  Eat with family and friends. Or take part in group meal programs offered through volunteer programs. Eating with others may help your appetite. And it helps you be more social.  Ask your doctor if your medicines could cause appetite or taste problems. If so, ask about changing medicines.  Add spices and herbs to increase the flavor of food.  If you think you are depressed, ask your doctor for help. Depression can affect your appetite. And it can make it hard to do everyday activities like grocery shopping and making meals. Treatment can help.  When should you call for help?  Watch closely for changes in your health, and be sure to contact your doctor if you have any problems.  Where can you learn more?  Go to https://www.FireDrillMe.net/patiented  Enter L643 in the search box to learn more about \"Nutrition for Older Adults: Care Instructions.\"  Current as of: February 26, 2023               Content Version: 13.8    5237-1730 [employer].   Care instructions adapted under license by your healthcare professional. If you have questions about a medical condition or this instruction, always ask your healthcare professional. [employer] disclaims any warranty or liability for your use of this information.      Preventing Falls: Care Instructions  Injuries and health problems such as trouble walking or poor eyesight can increase your risk of falling. So can some medicines. But there are things you can do to help prevent falls. You can exercise to get stronger. You can also arrange your home to make it safer.    Talk to your doctor about the medicines you take. Ask if any of them increase the risk of falls and whether they can be changed or stopped.   Try to exercise regularly. It can help improve your strength and balance. This can help lower your risk of falling.     Practice fall safety and prevention.    Wear low-heeled shoes that fit well and give your feet good support. Talk to " "your doctor if you have foot problems that make this hard.  Carry a cellphone or wear a medical alert device that you can use to call for help.  Use stepladders instead of chairs to reach high objects. Don't climb if you're at risk for falls. Ask for help, if needed.  Wear the correct eyeglasses, if you need them.    Make your home safer.    Remove rugs, cords, clutter, and furniture from walkways.  Keep your house well lit. Use night-lights in hallways and bathrooms.  Install and use sturdy handrails on stairways.  Wear nonskid footwear, even inside. Don't walk barefoot or in socks without shoes.    Be safe outside.    Use handrails, curb cuts, and ramps whenever possible.  Keep your hands free by using a shoulder bag or backpack.  Try to walk in well-lit areas. Watch out for uneven ground, changes in pavement, and debris.  Be careful in the winter. Walk on the grass or gravel when sidewalks are slippery. Use de-icer on steps and walkways. Add non-slip devices to shoes.    Put grab bars and nonskid mats in your shower or tub and near the toilet. Try to use a shower chair or bath bench when bathing.   Get into a tub or shower by putting in your weaker leg first. Get out with your strong side first. Have a phone or medical alert device in the bathroom with you.   Where can you learn more?  Go to https://www.Drop Messages.net/patiented  Enter G117 in the search box to learn more about \"Preventing Falls: Care Instructions.\"  Current as of: July 18, 2023               Content Version: 13.8    3943-6619 [email or web address].   Care instructions adapted under license by your healthcare professional. If you have questions about a medical condition or this instruction, always ask your healthcare professional. [email or web address] disclaims any warranty or liability for your use of this information.      Preventive Care Advice   This is general advice given by our system to help you stay healthy. However, your care team " may have specific advice just for you. Please talk to your care team about your preventive care needs.  Nutrition  Eat 5 or more servings of fruits and vegetables each day.  Try wheat bread, brown rice and whole grain pasta (instead of white bread, rice, and pasta).  Get enough calcium and vitamin D. Check the label on foods and aim for 100% of the RDA (recommended daily allowance).  Lifestyle  Exercise at least 150 minutes each week  (30 minutes a day, 5 days a week).  Do muscle strengthening activities 2 days a week. These help control your weight and prevent disease.  No smoking.  Wear sunscreen to prevent skin cancer.  Have a dental exam and cleaning every 6 months.  Yearly exams  See your health care team every year to talk about:  Any changes in your health.  Any medicines your care team has prescribed.  Preventive care, family planning, and ways to prevent chronic diseases.  Shots (vaccines)   HPV shots (up to age 26), if you've never had them before.  Hepatitis B shots (up to age 59), if you've never had them before.  COVID-19 shot: Get this shot when it's due.  Flu shot: Get a flu shot every year.  Tetanus shot: Get a tetanus shot every 10 years.  Pneumococcal, hepatitis A, and RSV shots: Ask your care team if you need these based on your risk.  Shingles shot (for age 50 and up)  General health tests  Diabetes screening:  Starting at age 35, Get screened for diabetes at least every 3 years.  If you are younger than age 35, ask your care team if you should be screened for diabetes.  Cholesterol test: At age 39, start having a cholesterol test every 5 years, or more often if advised.  Bone density scan (DEXA): At age 50, ask your care team if you should have this scan for osteoporosis (brittle bones).  Hepatitis C: Get tested at least once in your life.  STIs (sexually transmitted infections)  Before age 24: Ask your care team if you should be screened for STIs.  After age 24: Get screened for STIs if you're  at risk. You are at risk for STIs (including HIV) if:  You are sexually active with more than one person.  You don't use condoms every time.  You or a partner was diagnosed with a sexually transmitted infection.  If you are at risk for HIV, ask about PrEP medicine to prevent HIV.  Get tested for HIV at least once in your life, whether you are at risk for HIV or not.  Cancer screening tests  Cervical cancer screening: If you have a cervix, begin getting regular cervical cancer screening tests starting at age 21.  Breast cancer scan (mammogram): If you've ever had breasts, begin having regular mammograms starting at age 40. This is a scan to check for breast cancer.  Colon cancer screening: It is important to start screening for colon cancer at age 45.  Have a colonoscopy test every 10 years (or more often if you're at risk) Or, ask your provider about stool tests like a FIT test every year or Cologuard test every 3 years.  To learn more about your testing options, visit:   https://www.Periscape/620980.pdf.  For help making a decision, visit:   https://Entasso.Donde/vj60974.  Prostate cancer screening test: If you have a prostate, ask your care team if a prostate cancer screening test (PSA) at age 55 is right for you.  Lung cancer screening: If you are a current or former smoker ages 50 to 80, ask your care team if ongoing lung cancer screenings are right for you.  For informational purposes only. Not to replace the advice of your health care provider. Copyright   2023 [facility]. All rights reserved. Clinically reviewed by the [facility] Transitions Program. [employer] [id number] - REV 01/24.

## 2024-02-13 NOTE — PROGRESS NOTES
Preventive Care Visit  [facility] LEON Nichole PA-C, Physician Assistant  Feb 13, 2024    Assessment & Plan     Encounter for Medicare annual wellness exam  Schedule annual physical in one year.     Hypothyroidism due to Hashimoto's thyroiditis  Refilled today. Labs pending. Dose adjustment if TSH is outside of normal range  - levothyroxine (SYNTHROID/LEVOTHROID) 75 MCG tablet  Dispense: 90 tablet; Refill: 3  - TSH  - TSH    Essential hypertension  Stable. Well controlled. Labs and refill x one year  - amLODIPine (NORVASC) 5 MG tablet  Dispense: 90 tablet; Refill: 3  - lisinopril (ZESTRIL) 40 MG tablet  Dispense: 90 tablet; Refill: 3  - Comprehensive metabolic panel (BMP + Alb, Alk Phos, ALT, AST, Total. Bili, TP)  - Comprehensive metabolic panel (BMP + Alb, Alk Phos, ALT, AST, Total. Bili, TP)    Pure hypercholesterolemia  Mediterranean diet.  Continue to work on heart healthy diet and exercise.   - atorvastatin (LIPITOR) 20 MG tablet  Dispense: 90 tablet; Refill: 3  - Lipid panel reflex to direct LDL Non-fasting  - Lipid panel reflex to direct LDL Non-fasting    Essential tremor  Refilled. Not progressing.   - primidone (MYSOLINE) 250 MG tablet  Dispense: 90 tablet; Refill: 3              Counseling  Appropriate preventive services were discussed with this patient, including applicable screening as appropriate for fall prevention, nutrition, physical activity, Tobacco-use cessation, weight loss and cognition.  Checklist reviewing preventive services available has been given to the patient.  Reviewed patient's diet, addressing concerns and/or questions.   She is at risk for lack of exercise and has been provided with information to increase physical activity for the benefit of her well-being.             Daniel Minor is a 73 year old, presenting for the following:  Physical        2/13/2024     9:49 AM   Additional Questions   Roomed by Shani Tan   Accompanied by N/A          2/13/2024     9:49 AM   Patient Reported Additional Medications   Patient reports taking the following new medications Yes, LYRICA 25 MG         Health Care Directive  Patient does not have a Health Care Directive or Living Will: Patient states has Advance Directive and will bring in a copy to clinic.    HPI              2/9/2024   General Health   How would you rate your overall physical health? Good   Feel stress (tense, anxious, or unable to sleep) Not at all         2/9/2024   Nutrition   Diet: Regular (no restrictions)         2/9/2024   Exercise   Days per week of moderate/strenous exercise 3 days   Average minutes spent exercising at this level 30 min         2/9/2024   Social Factors   Frequency of gathering with friends or relatives Once a week   Worry food won't last until get money to buy more No   Food not last or not have enough money for food? No   Do you have housing?  Yes   Are you worried about losing your housing? No   Lack of transportation? No   Unable to get utilities (heat,electricity)? No         2/13/2024   Fall Risk   Gait Speed Test (Document in seconds) 4.85   Gait Speed Test Interpretation Less than or equal to 5.00 seconds - PASS          2/9/2024   Activities of Daily Living- Home Safety   Needs help with the following daily activites None of the above   Safety concerns in the home None of the above         2/9/2024   Dental   Dentist two times every year? Yes         2/9/2024   Hearing Screening   Hearing concerns? None of the above         2/9/2024   Driving Risk Screening   Patient/family members have concerns about driving No         2/9/2024   General Alertness/Fatigue Screening   Have you been more tired than usual lately? No         2/9/2024   Urinary Incontinence Screening   Bothered by leaking urine in past 6 months No         2/9/2024   TB Screening   Were you born outside of [place]?  No         Today's PHQ-2 Score:       2/13/2024     5:25 AM   PHQ-2 ( 1999 [employer])   Q1: Little  interest or pleasure in doing things 0   Q2: Feeling down, depressed or hopeless 0   PHQ-2 Score 0   Q1: Little interest or pleasure in doing things Not at all   Q2: Feeling down, depressed or hopeless Not at all   PHQ-2 Score 0           2024   Substance Use   Alcohol more than 3/day or more than 7/wk No   Do you have a current opioid prescription? No   How severe/bad is pain from 1 to 10? 6/10   Do you use any other substances recreationally? No     Social History     Tobacco Use    Smoking status: Former     Packs/day: 0.00     Years: 0.00     Additional pack years: 0.00     Total pack years: 0.00     Types: Cigarettes     Quit date: 2003     Years since quittin.1    Smokeless tobacco: Never   Vaping Use    Vaping Use: Never used   Substance Use Topics    Alcohol use: Yes     Alcohol/week: 11.7 standard drinks of alcohol     Comment: occ    Drug use: No            No data to display                     The 10-year ASCVD risk score (Riley CAVAZOS, et al., 2019) is: 17.6%    Values used to calculate the score:      Age: 73 years      Sex: Female      Is Non- : No      Diabetic: No      Tobacco smoker: No      Systolic Blood Pressure: 124 mmHg      Is BP treated: Yes      HDL Cholesterol: 50 mg/dL      Total Cholesterol: 170 mg/dL            Reviewed and updated as needed this visit by Provider   Tobacco     Med Hx  Surg Hx  Fam Hx              Current providers sharing in care for this patient include:  Patient Care Team:  Meri Pacheco MD as PCP - General (Internal Medicine)  Meri Pacheco MD as Assigned PCP  Cleopatra Elizondo, YANCI as Personal Advocate & Liaison (PAL)  Serafin Mccain DO as Assigned Neuroscience Provider  Phong Mckeon DPM as Assigned Surgical Provider  Phong Almanzar MD as Assigned Musculoskeletal Provider  Deidre Vigil MD as MD (Neurological Surgery)    The following health maintenance items are reviewed in Epic and correct as of  "today:  Health Maintenance   Topic Date Due    ANNUAL REVIEW OF HM ORDERS  Never done    RSV VACCINE (Pregnancy & 60+) (1 - 1-dose 60+ series) Never done    DTAP/TDAP/TD IMMUNIZATION (4 - Td or Tdap) 04/14/2023    MEDICARE ANNUAL WELLNESS VISIT  02/15/2024    TSH W/FREE T4 REFLEX  02/15/2024    MAMMO SCREENING  06/28/2024    COLORECTAL CANCER SCREENING  12/04/2024    FALL RISK ASSESSMENT  02/13/2025    DEXA  06/20/2025    GLUCOSE  02/15/2026    LIPID  02/15/2028    ADVANCE CARE PLANNING  02/15/2028    PHQ-2 (once per calendar year)  Completed    INFLUENZA VACCINE  Completed    Pneumococcal Vaccine: 65+ Years  Completed    ZOSTER IMMUNIZATION  Completed    COVID-19 Vaccine  Completed    IPV IMMUNIZATION  Aged Out    HPV IMMUNIZATION  Aged Out    MENINGITIS IMMUNIZATION  Aged Out    RSV MONOCLONAL ANTIBODY  Aged Out    HEPATITIS C SCREENING  Discontinued            Objective    Exam  /68 (BP Location: Left arm, Patient Position: Sitting, Cuff Size: Adult Regular)   Pulse 59   Temp 97  F (36.1  C) (Temporal)   Resp 18   Ht 1.615 m (5' 3.58\")   Wt 59.5 kg (131 lb 1.6 oz)   SpO2 99%   BMI 22.80 kg/m     Estimated body mass index is 22.8 kg/m  as calculated from the following:    Height as of this encounter: 1.615 m (5' 3.58\").    Weight as of this encounter: 59.5 kg (131 lb 1.6 oz).    Physical Exam  GENERAL: alert and no distress  EYES: Eyes grossly normal to inspection, PERRL and conjunctivae and sclerae normal  HENT: ear canals and TM's normal, nose and mouth without ulcers or lesions  NECK: no adenopathy, no asymmetry, masses, or scars  RESP: lungs clear to auscultation - no rales, rhonchi or wheezes  CV: regular rate and rhythm, normal S1 S2, no S3 or S4, no murmur, click or rub, no peripheral edema  ABDOMEN: soft, nontender, no hepatosplenomegaly, no masses and bowel sounds normal  MS: no gross musculoskeletal defects noted, no edema  SKIN: no suspicious lesions or rashes  NEURO: Normal strength and " tone, mentation intact and speech normal  PSYCH: mentation appears normal, affect normal/bright        2/13/2024   Mini Cog   Clock Draw Score 2 Normal   3 Item Recall 2 objects recalled   Mini Cog Total Score 4            Signed Electronically by: Priscila Nichole PA-C

## 2024-02-15 ENCOUNTER — ANCILLARY PROCEDURE (OUTPATIENT)
Dept: GENERAL RADIOLOGY | Facility: CLINIC | Age: 74
End: 2024-02-15
Attending: NEUROLOGICAL SURGERY
Payer: COMMERCIAL

## 2024-02-15 ENCOUNTER — OFFICE VISIT (OUTPATIENT)
Dept: NEUROSURGERY | Facility: CLINIC | Age: 74
End: 2024-02-15
Payer: COMMERCIAL

## 2024-02-15 ENCOUNTER — PRE VISIT (OUTPATIENT)
Dept: NEUROSURGERY | Facility: CLINIC | Age: 74
End: 2024-02-15

## 2024-02-15 VITALS
WEIGHT: 131 LBS | HEIGHT: 64 IN | DIASTOLIC BLOOD PRESSURE: 82 MMHG | RESPIRATION RATE: 16 BRPM | OXYGEN SATURATION: 99 % | BODY MASS INDEX: 22.36 KG/M2 | HEART RATE: 60 BPM | SYSTOLIC BLOOD PRESSURE: 147 MMHG

## 2024-02-15 DIAGNOSIS — M41.9 SCOLIOSIS: ICD-10-CM

## 2024-02-15 DIAGNOSIS — M48.061 LUMBAR FORAMINAL STENOSIS: ICD-10-CM

## 2024-02-15 DIAGNOSIS — M41.25 OTHER IDIOPATHIC SCOLIOSIS, THORACOLUMBAR REGION: ICD-10-CM

## 2024-02-15 DIAGNOSIS — M54.16 LUMBAR RADICULAR PAIN: ICD-10-CM

## 2024-02-15 DIAGNOSIS — M40.30 FLATBACK SYNDROME: Primary | ICD-10-CM

## 2024-02-15 DIAGNOSIS — M48.061 FORAMINAL STENOSIS OF LUMBAR REGION: ICD-10-CM

## 2024-02-15 PROCEDURE — 77073 BONE LENGTH STUDIES: CPT | Performed by: STUDENT IN AN ORGANIZED HEALTH CARE EDUCATION/TRAINING PROGRAM

## 2024-02-15 PROCEDURE — 99204 OFFICE O/P NEW MOD 45 MIN: CPT | Performed by: NEUROLOGICAL SURGERY

## 2024-02-15 PROCEDURE — 72082 X-RAY EXAM ENTIRE SPI 2/3 VW: CPT | Performed by: STUDENT IN AN ORGANIZED HEALTH CARE EDUCATION/TRAINING PROGRAM

## 2024-02-15 ASSESSMENT — PAIN SCALES - GENERAL: PAINLEVEL: MODERATE PAIN (5)

## 2024-02-15 NOTE — PATIENT INSTRUCTIONS
Please call [phone] to make an appointment to see Dr. Vigil if you would like to discuss surgery further.

## 2024-02-15 NOTE — LETTER
2/15/2024       RE: Mily Hilliard  [address]  [place] [address]       Dear Colleague,    Thank you for referring your patient, Mily Hilliard, to the [facility] NEUROSURGERY CLINIC [place] at [facility]. Please see a copy of my visit note below.        Neurosurgery Clinic Note    Chief Complaint: back pain    History of Present Illness:  It was a pleasure to evaluate Mily Hilliard in clinic today at the kind referral of Serafin Mccain DO  [address] [place] [address].    Mily Hilliard is a 73 year old female presenting with low back pain radiating to right leg, worse with prolonged standing or sitting, better with L4-5 TFESI and with moving around. She has constant foot and hand numbness after chemotherapy for breast cancer. Injections work for her leg pain, but she has needed several in past year per Dr. Mccain' records.  No prior spine surgery  No known scoliosis prior to recently.      Past Medical History:   Diagnosis Date    Arthritis     Breast cancer (H)     right breast- lumpectomy, lymph 15/19 pos - radiation and chemo    Hypercholesterolemia     Hypertension     Hypothyroidism        Past Surgical History:   Procedure Laterality Date    Bilateral Foot surgery      COLONOSCOPY  2014    Dr. Naylor [facility]    COLONOSCOPY N/A 2019    Procedure: COLONOSCOPY;  Surgeon: Catrachito Naylor MD;  Location:  GI    EYE SURGERY      cataract removal, macular hole repair    LUMPECTOMY BREAST      Right       Social History     Socioeconomic History    Marital status:      Spouse name: None    Number of children: None    Years of education: None    Highest education level: None   Tobacco Use    Smoking status: Former     Packs/day: 0.00     Years: 0.00     Additional pack years: 0.00     Total pack years: 0.00     Types: Cigarettes     Quit date: 2003     Years since quittin.1    Smokeless  tobacco: Never   Vaping Use    Vaping Use: Never used   Substance and Sexual Activity    Alcohol use: Not Currently     Alcohol/week: 11.7 standard drinks of alcohol     Types: 12 Standard drinks or equivalent per week    Drug use: No    Sexual activity: Not Currently     Partners: Male     Birth control/protection: None   Other Topics Concern    Parent/sibling w/ CABG, MI or angioplasty before 65F 55M? No   Social History Narrative    Lives with . In own home.    Four adult children. Grand- 7,     Work- retired business, retail.    Drives. Socializes.      Social Determinants of Health     Financial Resource Strain: Low Risk  (2/9/2024)    Financial Resource Strain     Within the past 12 months, have you or your family members you live with been unable to get utilities (heat, electricity) when it was really needed?: No   Food Insecurity: Low Risk  (2/9/2024)    Food Insecurity     Within the past 12 months, did you worry that your food would run out before you got money to buy more?: No     Within the past 12 months, did the food you bought just not last and you didn t have money to get more?: No   Transportation Needs: Low Risk  (2/9/2024)    Transportation Needs     Within the past 12 months, has lack of transportation kept you from medical appointments, getting your medicines, non-medical meetings or appointments, work, or from getting things that you need?: No   Physical Activity: Insufficiently Active (2/9/2024)    Exercise Vital Sign     Days of Exercise per Week: 3 days     Minutes of Exercise per Session: 30 min   Stress: No Stress Concern Present (2/9/2024)    Citizen of [place] [employer] - Occupational Stress Questionnaire     Feeling of Stress : Not at all   Social Connections: Unknown (2/9/2024)    Social Connection and Isolation Panel [NHANES]     Frequency of Social Gatherings with Friends and Family: Once a week   Interpersonal Safety: Low Risk  (2/13/2024)    Interpersonal Safety      Do you feel physically and emotionally safe where you currently live?: Yes     Within the past 12 months, have you been hit, slapped, kicked or otherwise physically hurt by someone?: No     Within the past 12 months, have you been humiliated or emotionally abused in other ways by your partner or ex-partner?: No   Housing Stability: Low Risk  (2/9/2024)    Housing Stability     Do you have housing? : Yes     Are you worried about losing your housing?: No       family history includes Bladder Cancer in her father; Breast Cancer in her mother and sister; Cancer - colorectal in her mother; Diabetes in her sister and sister; Macular Degeneration in her father; No Known Problems in her brother and brother; Stomach Cancer in her maternal grandfather.        IMAGING per my own measurement and interpretation:  Xrays:standing long cassette EOS 02/15/24  Significant coronal malalignment >3cm    ,  Severe sagittal plane malalignment      MRI lumbar 2/11/24  Severe right L4-5 cephalocaudal and dorsal-ventral foraminal stenosis due to collapse of disc height asymmetric due to scoliosis      Resulted Imaging/Labs:  Bone Density:  MR Lumbar Spine w/o Contrast    Result Date: 2/11/2024  EXAM: MR LUMBAR SPINE W/O CONTRAST LOCATION: [facility] DATE: 2/11/2024 INDICATION: Left lumbar radicular pain. COMPARISON: None. TECHNIQUE: Routine Lumbar Spine MRI without IV contrast. FINDINGS: Nomenclature is based on 5 lumbar type vertebral bodies. Lumbar spine lordosis is maintained. Lumbar vertebral body heights are maintained. Grade I retrolisthesis of L2 on L3, L3 on L4, and L4 on L5. Mixed Modic type I and type II degenerative endplate changes involving the left lateral apposing endplates at L2-L3 and L3-L4. Mixed Modic type I and type II degenerative endplate changes involving the right lateral apposing endplates at L4-L5. Rightward convex curvature of the lumbar spine. T2 hyperintense bilateral renal cysts.  Normal distal spinal cord and cauda equina with conus medullaris at L1. No extraspinal abnormality. Unremarkable visualized bony pelvis. T12-L1: Disc desiccation and mild loss of disc space height. Circumferential disc osteophyte complex. Normal facets. No spinal canal stenosis or neural foraminal narrowing. L1-L2: Disc desiccation and mild loss of disc space height. Circumferential disc osteophyte complex. Normal facets. No spinal canal stenosis or neural foraminal narrowing. L2-L3: Disc desiccation and mild loss of disc space height. Circumferential disc osteophyte complex. Normal facets. No spinal canal stenosis or neural foraminal narrowing. L3-L4: Disc desiccation and moderate loss of disc space height. Circumferential disc osteophyte complex. Normal facets. No spinal canal stenosis. No right and mild left neural foraminal stenosis. L4-L5: Disc desiccation and mild loss of disc space height. Circumferential disc osteophyte complex. Mild degenerative facet changes. Right-sided ligamentum flavum thickening. Severe right and no significant left neural foraminal stenosis. L5-S1: Disc desiccation and mild loss of disc space height. Minimal circumferential disc osteophyte complex. Moderate right and mild left degenerative facet changes. No spinal canal stenosis or neural foraminal narrowing.     IMPRESSION: 1.  Mild to moderate lumbar spondylosis without significant lumbar spinal canal narrowing. 2.  At L4-L5, there is circumferential disc interbody spurring and hypertrophic facet changes contributing to severe right neural foraminal stenosis with deformity of the exiting right L4 nerve root. 3.  Multilevel Modic type I degenerative endplate changes involving the apposing endplates at L2-L3, L3-L4, and L4-L5.    PAIN Transforaminal [facility] Inj Lumbosacral One Level Right    Result Date: 1/10/2024  LUMBAR EPIDURAL STEROID INJECTION TRANSFORAMINAL APPROACH WITH FLUOROSCOPIC GUIDANCE Performed on: 1/10/24 Pre Procedure  Diagnosis:  LBP, Lumbar radiculitis Post Procedure Diagnosis:  Same Procedure Performed:  Lumbar Transforaminal Epidural Steroid Injection with Fluoroscopic Guidance Clinical Scenario:  As per office notes Anesthesia/Fluids:  As per intra-procedure documentation Vital Signs:  As per intra-procedure documentation Level Injected: L4-5 Side Injected: Right CC: Mily Hilliard  is a 73 year-old female who presents today for a right L4-5 transforaminal epidural steroid injection as ordered by Dr. Mccain.  This was switched from an L4-5 interlaminar as there was difficulty with access.  The patient complains of right lower extremity pain.   Lumbar MRI was reviewed.  The patient wanted to proceed with the injection today.  The patient denies feeling ill today or having an active infection (denies taking antibiotics).  The patient does not take any prescription blood thinning medications.  The patient denies any allergies to contrast.    The procedure of epidural steroid injection was discussed in detail along with the attendant risks, including but not limited to: infection, bleeding, nerve injury, paralysis.   The patient's questions were answered and they understood the information given.   The patient elected to proceed and signed informed consent.  . The patient was placed in a prone position on the fluoroscopy table. A procedural pause was conducted to verify: correct patient identity, procedure to be performed and as applicable, correct side and site, correct patient position, and availability of implants, special equipment and special requirements.  The lower back was prepped and draped in usual fashion.  After anesthetizing the skin with 1% lidocaine, a 3.5 inch, 22 gauge needle was introduced at the Right L4 pedicle under fluoroscopic guidance.  After aspiration was negative, 1 mL of  Omnipaque 300 was injected under continuous fluoroscopy.  Epidural flow without vascular uptake was seen.  1 mL of 1% lidocaine  was injected as a test dose. After an appropriate period of observation, a directed neurological exam was performed which revealed no new neurologic deficits.    Subsequently, 10 mgs of Dexamethasone was slowly injected. Following the injection the needle was withdrawn slightly and flushed with local anesthetic as it was fully extracted.  The patient tolerated the procedure well and there were no apparent complications.  The patient did not develop any new neurologic deficits.  After appropriate observation, the patient was dismissed in good condition under their own power. PRE-PROCEDURE PAIN SCORE:  5/10 POST-PROCEDURE PAIN SCORE:  0/10 The patient tolerated the procedure well.  The patient was instructed to call the [facility] if any questions or concerns arise after the procedure. After a short period of observation the patient was discharged.  The patient will follow-up with Dr. Mccain in 2 to 4 weeks.     PAIN Transforaminal [facility] Inj Lumbosacral One Level Right    Result Date: 12/27/2023  LUMBAR EPIDURAL STEROID INJECTION TRANSFORAMINAL APPROACH WITH FLUOROSCOPIC GUIDANCE Performed on: 12/27/23 Ordered by: Serafin Mccain DO [address] [facility] [id number] Pre Procedure Diagnosis:  LBP, Lumbar radiculitis Post Procedure Diagnosis:  Same Procedure Performed:  Lumbar Transforaminal Epidural Steroid Injection with Fluoroscopic Guidance Clinical Scenario:  As per office notes Anesthesia/Fluids:  As per intra-procedure documentation Vital Signs:  As per intra-procedure documentation Level Injected:  L4-L5 Side Injected: Right Injectate: None (injection not completed) Mily Hilliard is a 73 year old female who presents today for a lumbar transforaminal epidural steroid injection as ordered.  The patient complains of low back and leg pain. MRI was reviewed today. The patient denies feeling ill today or having an active infection (denies taking antibiotics). The patient denies any allergies to contrast.  "The procedure of epidural steroid injection was discussed in detail along with the attendant risks, including but not limited to:  back pain, subdural puncture with a subsequent headache (possible need for epidural blood patch), nerve injury, infection, bleeding, epidural hematoma (with need for surgical evacuation), allergic reaction,  worsening of pain along with risk of stroke, paralysis and death. Potential benefits including improvements in pain, function, and sleep were also discussed. Alternatives were offered. The patient was given an opportunity to ask any questions and clarify their understanding. The patient decided to proceed and signed informed consent. The patient denies any symptoms of an active infection and denies taking antibiotics. The patient denies taking any prescription blood thinning medications, or if prescribed they have been held . The patient denies any allergies to iodine or iodine contrast. The proceduralist wore sterile gloves and a mask for the procedure, and all other personnel in the room wore a mask. The patient was placed in a prone position on the fluoroscopy table. A procedural time-out was conducted to verify: correct patient identity, procedure to be performed and as applicable, correct side and site, correct patient position, and availability of implants, special equipment and special requirements.  The lower back was prepped and draped in usual fashion. The fluoroscope was adjusted and optimized for coaxial needle advancement in an oblique approach. After anesthetizing the skin with 1% lidocaine, a 5\", 22 gauge needle was introduced and advanced under fluoroscopic guidance. Multiplanar imaging confirmed correct needle placement. After aspiration was negative, 1 mL of  Omnipaque 300 was injected under live fluoroscopy.  Flow was noted to be possibly going epidurally but also appeared to be going into the disc, so decision was made to stop procedure. No medication was injected " and the needle was withdrawn..  The patient tolerated the procedure well and there were no apparent complications.  The patient did not develop any new neurologic deficits.  After appropriate observation, the patient was returned to the recovery area in good condition under their own power. Patient was advised of the above outcome and our decision to halt the procedure as a safety measure. Patient will be given Keflex 500mg 4 times daily for 7 days and was told to call us if any new neurologic deficit, fever, chills, or worsening back pain. The patient was instructed to call the [facility] if any questions or concerns arise after the procedure. After a short period of observation the patient was discharged.     Pain Transforaminal [facility] Inj Lmbscrl 1 Lvl Rt    Result Date: 8/16/2023  LUMBAR EPIDURAL STEROID INJECTION TRANSFORAMINAL APPROACH WITH FLUOROSCOPIC GUIDANCE Performed on: 8/16/23 Pre Procedure Diagnosis:  LBP, Lumbar radiculitis Post Procedure Diagnosis:  Same Procedure Performed:  Lumbar Transforaminal Epidural Steroid Injection with Fluoroscopic Guidance Clinical Scenario:  As per office notes Anesthesia/Fluids:  As per intra-procedure documentation Vital Signs:  As per intra-procedure documentation Level Injected: L4-5 Side Injected: Right CC: Mily ELZA Hilliard is a 73 year-old female who presents today for a right L4-5 transforaminal epidural steroid injection as ordered by Dr. Mccain.  The patient complains of right lower extremity pain.   Lumbar MRI was reviewed.  The patient wanted to proceed with the injection today.  The patient denies feeling ill today or having an active infection (denies taking antibiotics).  The patient does not take any prescription blood thinning medications.  The patient denies any allergies to contrast.    The procedure of epidural steroid injection was discussed in detail along with the attendant risks, including but not limited to: infection, bleeding, nerve  injury, paralysis.   The patient's questions were answered and they understood the information given.   The patient elected to proceed and signed informed consent.  . The patient was placed in a prone position on the fluoroscopy table. A procedural pause was conducted to verify: correct patient identity, procedure to be performed and as applicable, correct side and site, correct patient position, and availability of implants, special equipment and special requirements.  The lower back was prepped and draped in usual fashion.  After anesthetizing the skin with 1% lidocaine, a 3.5 inch, 22 gauge needle was introduced at the Right L4 pedicle under fluoroscopic guidance.  After aspiration was negative, 1 mL of  Omnipaque 300 was injected under continuous fluoroscopy.  Epidural flow without vascular uptake was seen.  1 mL of 1% lidocaine was injected as a test dose. After an appropriate period of observation, a directed neurological exam was performed which revealed no new neurologic deficits.    Subsequently, 10 mgs of Dexamethasone was slowly injected. Following the injection the needle was withdrawn slightly and flushed with local anesthetic as it was fully extracted.  The patient tolerated the procedure well and there were no apparent complications.  The patient did not develop any new neurologic deficits.  After appropriate observation, the patient was dismissed in good condition under their own power. PRE-PROCEDURE PAIN SCORE:  6/10 POST-PROCEDURE PAIN SCORE:  1/10 The patient tolerated the procedure well.  The patient was instructed to call the [facility] if any questions or concerns arise after the procedure. After a short period of observation the patient was discharged.  Patient will follow-up with Dr. Mccain in 2 to 4 weeks.    MA Screening Digital Bilateral    Result Date: 6/28/2023  BILATERAL FULL FIELD DIGITAL SCREENING MAMMOGRAM Performed on: 6/28/23 Compared to: 06/22/2022, 06/15/2021,  06/01/2020, and 03/29/2019 Technique: This study was evaluated with the assistance of Computer-Aided Detection. Findings: The breasts have scattered areas of fibroglandular density. There are post-surgical changes in the right breast. There is no radiographic evidence of malignancy.     IMPRESSION: ACR BI-RADS Category 2: Benign RECOMMENDED FOLLOW-UP: Annual routine screening mammogram The results and recommendations of this examination will be communicated to the patient. Dale Mccauley MD     XR Toe Right G/E 2 Views    Result Date: 6/8/2023  XR TOE RIGHT G/E 2 VIEWS 6/8/2023 9:32 AM HISTORY: right second toe pain, ulcer, bony widening around the proximal interphalangeal joint; Ulcer of right second toe, limited to breakdown of skin (H); Toe pain, right COMPARISON: None.     IMPRESSION: There is osseous demineralization. No acute fracture is seen. There is arthropathy at the second proximal interphalangeal joint with dorsal subluxation of the second middle phalanx. No definite evidence of osteomyelitis radiographically. If there is persistent concern, MRI would be more sensitive. MIGUE HIGHTOWER MD   SYSTEM ID:  [id number]    US MYRTLE Doppler with Exercise Bilateral    Result Date: 6/2/2023  IR MYRTLE US MYRTLE DOPPLER WITH EXERCISE BILATERAL   6/2/2023 9:50 AM HISTORY: decreased pulses with leg cramping; Decreased pulses in feet COMPARISON: None. FINDINGS: Right MYRTLE: DP: 1.04 PT: 1.06. Left MYRTLE: DP: 1.03 PT: 1.07. Waveforms: Triphasic in the distal tibial arteries Exercise: The patient walked on a treadmill for 5 minutes at 1.5 miles per hour and at a 10% grade. At 1 minute 30 seconds, the patient had left leg cramping. At 4 minutes 20 seconds, the patient had left foot cramping. Right exercise MYRTLE: 1.17. Left exercise MYRTLE: 1.20     IMPRESSION: Ankle-brachial indices are within normal limits. Findings do not indicate significant lower extremity arterial insufficiency. MYRTLE CRITERIA: >0.95 Normal 0.90 - 0.94 Mild 0.5 - 0.89  Moderate 0.2 - 0.49 Severe <0.2 Critical EVELYN ELDER MD   SYSTEM ID:  [id number]    XR Thoracic Spine 3 Views    Result Date: 6/2/2023  THORACIC SPINE THREE VIEWS 6/2/2023 9:02 AM COMPARISON: None. HISTORY: Thoracolumbar pain, evaluate for fracture. Lumbar spine pain. Pain in thoracic spine. FINDINGS: Straightening of usual thoracic kyphosis and mild leftward curvature. Mild rightward scoliosis and accentuation of kyphosis at the thoracolumbar junction. Otherwise normal alignment. Normal vertebral body heights without discrete compression fracture. Moderate disc space narrowing at T12-L1 and L1-L2. Moderate marginal osteophytes. The disc spaces are otherwise relatively maintained within the thoracic spine. The visualized ribs and intrathoracic structures are unremarkable. RO GOMEZ MD   SYSTEM ID:  [id number]    PAIN Transforaminal [facility] Inj Lumbosacral Right    Result Date: 4/6/2023  LUMBAR EPIDURAL STEROID INJECTION TRANSFORAMINAL APPROACH WITH FLUOROSCOPIC GUIDANCE Performed on: 4/6/23 Pre Procedure Diagnosis:  LBP, Lumbar radiculitis Post Procedure Diagnosis:  Same Procedure Performed:  Lumbar Transforaminal Epidural Steroid Injection with Fluoroscopic Guidance Clinical Scenario:  As per office notes Anesthesia/Fluids:  As per intra-procedure documentation Vital Signs:  As per intra-procedure documentation Level Injected: Right L4-5 Side Injected: Right CC: Milypanfilo Hilliard is a 72 year-old female who presents today for a  right L4-5 transforaminal epidural steroid injection as ordered by Dr. Mccain.  The patient complains of right lower extremity pain.   Lumbar MRI was reviewed today and shows L4-5 foraminal stenosis.  The patient wanted to proceed with the injection today.  The patient denies feeling ill today or having an active infection (denies taking antibiotics).  The patient does not take any prescription blood thinning medications.  The patient denies any allergies to contrast.    The procedure  of epidural steroid injection was discussed in detail along with the attendant risks, including but not limited to: infection, bleeding, nerve injury, paralysis.   The patient's questions were answered and they understood the information given.   The patient elected to proceed and signed informed consent.  . The patient was placed in a prone position on the fluoroscopy table. A procedural pause was conducted to verify: correct patient identity, procedure to be performed and as applicable, correct side and site, correct patient position, and availability of implants, special equipment and special requirements.  The lower back was prepped and draped in usual fashion.  After anesthetizing the skin with 1% lidocaine, a 3.5 inch, 22 gauge needle was introduced at the Right L4 pedicle under fluoroscopic guidance.  After aspiration was negative, 1 mL of  Omnipaque 300 was injected under continuous fluoroscopy.  Epidural flow without vascular uptake was seen.  1 mL of 1% lidocaine was injected as a test dose. After an appropriate period of observation, a directed neurological exam was performed which revealed no new neurologic deficits.    Subsequently, 10 mgs of Dexamethasone was slowly injected. Following the injection the needle was withdrawn slightly and flushed with local anesthetic as it was fully extracted.  The patient tolerated the procedure well and there were no apparent complications.  The patient did not develop any new neurologic deficits.  After appropriate observation, the patient was dismissed in good condition under their own power. PRE-PROCEDURE PAIN SCORE:  6/10 POST-PROCEDURE PAIN SCORE:  0/10 The patient tolerated the procedure well.  The patient was instructed to call the [facility] if any questions or concerns arise after the procedure. After a short period of observation the patient was discharged.  Patient will follow up with Dr. Mccain in 2 to 4 weeks.    XR Foot Right G/E 3  Views    Result Date: 3/18/2023  For Patients: As a result of the 21st Century Cures Act, medical imaging exams and procedure reports are released immediately into your electronic medical record. You may view this report before your referring provider. If you have questions, please contact your health care provider. EXAM: XR FOOT 3 VIEWS RIGHT LOCATION: [facility] [place] DATE/TIME: 3/18/2023 11:48 AM INDICATION: TOE PAIN/PROBLEM COMPARISON: None.    Normal joint spaces and alignment. No fracture.    PAIN Medial Branch Block Lumbar Two Levels Right    Result Date: 3/16/2023  DIAGNOSTIC LUMBAR MEDIAL BRANCH BLOCKS WITH FLUOROSCOPIC GUIDANCE Performed on: 3/16/23 Pre Procedure Diagnosis:  Lumbar spondylosis, LBP, Lumbar Facet Syndrome Post Procedure Diagnosis:  Same Procedure Performed:  Diagnostic Lumbar Medial Branch Block with Fluoroscopic Guidance  Clinical Scenario:  As per office notes Anesthesia/Fluids:  As per intra-procedure documentation Vital Signs:  As per intra-procedure documentation Level Injected:  L3, L4 and L5 Side Injected: Right CC: Mily ELZA Hilliard is a 72 year-old female who presents today for right L3, L4, L5.   The patient complains of low back pain without significant radicular symptoms.  The patient understands that these are diagnostic injections only and will not provide any long lasting relief.  Further treatment options can potentially be offered, depending on the amount of pain relief with today's injections.  She wanted to proceed.  Advanced imaging is reviewed today.  The procedure of lumbar medial branch block was discussed in detail along with the attendant risks, including but not limited to:  back pain, nerve injury, infection, bleeding, worsened pain, allergic reaction.  The patient was also informed that there may be the need for additional injections (or other interventions, such as: facet joint injections,radiofrequency procedures..) depending on response to the injections  today.  The patient decided to proceed and signed informed consent. The patient denies any symptoms of an active infection and denies taking antibiotics. The patient denies taking any prescription blood thinning medications. The patient denies any allergies to iodine or iodine contrast.  The patient was placed  in the prone position in the fluoroscopy table. A procedural pause was performed to verify the patient's name and  as well as the site and side of the injection. The low back was then  prepped and draped in the usual sterile fashion.  After localizing the anatomy under fluoroscopy 3.5 inch,25 gauge spinal needles were introduced into the right L3, L4, L5 medial branch levels mentioned above under fluoroscopic guidance.   After aspiration was negative for blood or CSF, approximately 0.1 ml of Omnipaque 300 was injected at each site.  There was no vascular uptake.  Subsequently, 0.5 ml of 0.5% bupivacaine was injected at each level.  The needles were withdrawn without flushing.  Pre-procedure pain score:  7/10 Post-procedure pain score:  5/10 Pain diary given. The patient tolerated the procedure well and instructed in the use of a pain diary.  If there are questions or concerns that arise, the patient was instructed to call the [facility] spine clinic.  After a short period of observation, the patient was discharged.  The patient will be contacted with the next steps in care after the pain diary is received and reviewed.     MR Lumbar Spine w/o Contrast    Result Date: 2023  MRI LUMBAR SPINE WITHOUT CONTRAST January 10, 2023 2:44 PM HISTORY: Worsening low back pain, history of compression fracture and degenerative disease. Chronic right-sided low back pain without sciatica. Compression fracture of L1 vertebra, initial encounter (H). TECHNIQUE: Multiplanar multisequence MRI of the lumbar spine without contrast. COMPARISON: None. FINDINGS: Alignment is significant for thoracolumbar scoliosis and  multilevel grade 1 spondylolisthesis. Bone marrow demonstrates scattered mixed degenerative endplate changes. Conus medullaris is unremarkable terminating at the level of the L1-L2 disc. Cauda equina is unremarkable. Bilateral sacroiliac joint degenerative change. Small areas of nonspecific T2 hyperintense signal within the bilateral kidneys and liver which are incompletely characterized but statistically likely benign. Segmental Analysis: T12-L1: Mild disc height loss. Disc bulge with central protrusion/annular fissure. Mild bilateral facet arthropathy. No foraminal or spinal canal stenosis. L1-L2: Mild disc height loss. Disc bulge. Mild bilateral facet arthropathy. No foraminal or spinal canal stenosis.  L2-L3: Mild disc height loss. Disc bulge with annular fissure. Mild bilateral facet arthropathy. No foraminal or spinal canal stenosis. Slight asymmetric left lateral recess related to focal left central bulge.  L3-L4: Severe disc height loss. Disc bulge. Mild bilateral facet arthropathy. Mild bilateral foraminal stenosis. Mild, if any, spinal canal stenosis.  L4-L5: Mild disc loss. Disc bulge, asymmetric to the right. Mild bilateral facet arthropathy. Severe right foraminal stenosis. No left foraminal stenosis. No spinal canal stenosis.  L5-S1: Mild disc height loss. Disc bulge. Moderate bilateral facet arthropathy. No foraminal or spinal canal stenosis.     IMPRESSION:  1. Multilevel degenerative change as detailed. 2. Foraminal stenosis is severe on the right at L4-L5. AUGUSTO JONES MD   SYSTEM ID:  [id number]    XR Lumbar Spine 2/3 Views    Result Date: 12/29/2022  EXAM: XR LUMBAR SPINE 2/3 VIEWS LOCATION: [facility] DATE/TIME: 12/28/2022 6:14 PM INDICATION: lower back pain COMPARISON: None. TECHNIQUE: CR Lumbar Spine.     IMPRESSION: No fracture. Normal vertebral heights. There is thoracolumbar dextroscoliosis centered at L2. Evans angle measures 27 degrees between the superior endplates  of T12 and L5. There is reversal of the normal thoracolumbar AP curvature. There is moderately advanced multilevel disc height loss. There is moderately advanced multilevel facet hypertrophy. Aortic atherosclerosis is present    XR Shoulder Right G/E 3 Views    Result Date: 2022  EXAM: XR SHOULDER RIGHT G/E 3 VIEWS LOCATION: [facility] DATE/TIME: 2022 6:18 PM INDICATION: Acute pain of right shoulder. COMPARISON: 2020 radiograph of the chest.     IMPRESSION: Mild glenohumeral and acromioclavicular joint degenerative change. There is no evidence of an acute fracture. Chronic appearing area of calcification/mineralization and surgical clips in the right chest wall/axillary region. Atherosclerotic vascular calcifications.    DX Hip/Pelvis/Spine w Lateral    Result Date: 2022  BONE DENSITOMETRY [address] [address] 2022   PATIENT: Mily Hilliard CHART: [id number] :  1950 AGE:  71 year old SEX:  female REFERRING PROVIDER:  Meri Pachceo MD   PROCEDURE:  Bone density scanning was performed using DXA technology of the lumbar spine and hip.  Scanning was performed on a Lunar Prodigy scanner.  Reporting is completed in the form of a T-score.  The T-score represents the standard deviation from peak bone mass based on a young healthy adult.   REFERENCE T-SCORES:     Normal                -1.0 and greater                               Osteopenia         Between -1.0 and -2.5                                         Osteoporosis     -2.5 and less                                   RISK FACTORS:  Post-menopausal, Follow-up osteopenia CURRENT TREATMENT:  Calcium   FINDINGS:              Lumbar Spine (L1-L2)      T-score:  -1.5, marked degenerative changes present att L3,4, so only L1,2 are evaluated.              Left Femoral Neck            T-score:  -1.2              Right Femoral Neck          T-score:  -2.1               "             Lumbar (L1-L2) BMD: 0.989  Previous: 1.016                        Total Hip Mean BMD: 0.842  Previous: 0.847   Comparison is made to another DXA performed on the same Lunar Prodigy  machine on 06/14/2018.  LATERAL VERTEBRAL ASSESSMENT Procedure:  Vertebral fracture assessment was performed in the lateral decubitus position using a Lunar Prodigy  densitometer. Indications for VFA: T-score of -1.0 or worse and age (female>69) Confounding factors for VFA: None.  The LVA scan is interpretable from T9 to L5. VFA Findings: Using the semi-quantitative analysis of Areli there was evidence of spinal deformity as follows:  moderate (grade 2) biconcave fracture of L1 VFA Impression: Mily Hilliard has one vertebral fracture identified on the LVA.  If alternative etiologies for the presence of vertebral fractures are excluded, the diagnosis is consistent with severe osteoporosis. IMPRESSION Severe osteoporosis on the basis of vertebral fracture. Degenerative changes at the lumbar spine may falsely elevat results. There has been no significant change in bone density of the lumbar spine. There has been no significant change in bone density of the hip(s). Recommendations include ensuring adequate Calcium and Vitamin D. The current NOF Guidelines recommend treatment for patients with prior hip or vertebral fracture, T-score -2.5 or below, or 10 year risk of any major osteoporotic fracture 20% or greater, or 10 year risk of hip fracture 3% or greater as calculated using the FRAX calculator (www.shef.ac.uk/FRAX or you can google \"FRAX\").  Based on these guidelines, treatment (in addition to calcium and vitamin D) is recommended for this patient, after ruling out other causes of osteoporosis. This is meant as an aid to clinical decision making; one must still use clinical judgement. Follow up can be considered in 2 years. ___________________ Alpa Donald M.D. Electronically signed       *MA Screening Digital " "Bilateral    Result Date: 6/22/2022  BILATERAL FULL FIELD DIGITAL SCREENING MAMMOGRAM Performed on: 6/22/22 Compared to: 06/15/2021, 06/01/2020, 03/29/2019, and 03/26/2018 Technique: This study was evaluated with the assistance of Computer-Aided Detection. Findings: The breasts have scattered areas of fibroglandular density. There are breast conservation changes in the right breast. There is no radiographic evidence of malignancy. IMPRESSION: ACR BI-RADS Category 2: Benign RECOMMENDED FOLLOW-UP: Annual routine screening mammogram The results and recommendations of this examination will be communicated to the patient.     US Abdomen Limited    Result Date: 2/21/2022  ULTRASOUND ABDOMEN LIMITED  2/21/2022 9:10 AM CLINICAL HISTORY: Elevated liver enzymes. TECHNIQUE: Limited abdominal ultrasound. COMPARISON: None. FINDINGS: GALLBLADDER: The gallbladder is normal. No gallstones, wall thickening, or pericholecystic fluid. Negative sonographic Christian's sign. BILE DUCTS: There is no biliary dilatation. The common duct measures 5 mm. LIVER: Fatty liver. Liver is 15 cm. Small fat sparing near the gallbladder. RIGHT KIDNEY: No hydronephrosis. PANCREAS: The visualized portions of the pancreas are normal. No ascites.     IMPRESSION: 1.  No acute abnormality. 2.  Fatty infiltration of the liver. Mildly enlarged liver. RO LÓPEZ MD   SYSTEM ID:  [id number]      Vitamin D:  Vitamin D Deficiency Screening Results:  Lab Results   Component Value Date    VITDT 51 01/27/2023    VITDT 40 07/14/2022     No results found for: \"BNT460\", \"UIXI886\", \"QPLH00ERQUY\", \"VITD3\", \"D2VIT\", \"D3VIT\", \"DTOT\", \"[id number]\", \"[id number]\", \"[id number]\", \"[id number]\", \"[id number]\", \"[id number]\"      Nutritional Status:  Estimated body mass index is 22.78 kg/m  as calculated from the following:    Height as of this encounter: 1.615 m (5' 3.58\").    Weight as of this encounter: 59.4 kg (131 lb).    Lab Results   Component Value Date    ALBUMIN 4.4 02/13/2024 " "   ALBUMIN 3.6 02/09/2022    ALBUMIN 4.0 11/12/2020       Diabetes Screening:  Lab Results   Component Value Date    A1C 6.1 02/09/2022       Nicotine Usage:    No                Physical Exam   BP (!) 147/82   Pulse 60   Resp 16   Ht 1.615 m (5' 3.58\")   Wt 59.4 kg (131 lb)   SpO2 99%   BMI 22.78 kg/m    Constitutional: Oriented to person, place, and time. Appears well-developed and well-nourished. Cooperative. No distress.     Neurological: alert and oriented to person, place, and time.   sensory deficit bilateral hands and feet  Gait shuffling    Reflex Scores:           Bicep reflexes are 2+ on the right side and 2+ on the left side.       Brachioradialis reflexes are 2+ on the right side and 2+ on the left side.       Patellar reflexes are 1 on the right side and 1 on the left side.       Achilles reflexes are 1 on the right side and 1 on the left side.    STRENGTH LEFT RIGHT   Deltoid 5 5   Bicep 5 5   Wrist Extensor 5 5   Tricep 5 5   Finger flexion 5 5   Finger abduction 5 5    5 5       Hip Flexion     4     4   Knee Extension 5 5   Ankle Dorsiflexion 5 5   Extensor Hallucis Longus 5 5   Plantar Flexion 5 5   Foot eversion 5 5   Foot inversion 5 5     Sacroiliac Joint Exam LEFT RIGHT   Fabian Finger Test + +   PSIS tenderness - -   ThighThrust - -   DEVAN + +   Pelvic Gapping - -   Pelvic Compression + -   Gaenslen s - -   Sacral Thrust - -   Hip Exam     Posterior impingement - -   Medial femoral impingement - -         Skin: Skin is warm, dry and intact.   Psychiatric: Normal mood and affect. Speech is normal and behavior is normal.        ASSESSMENT:  Mily Hilliard is a 73 year old female with kyphoscoliosis, severe sagittal malaligmnent, coronal malalignment, lumbar radiculopathy    PLAN:  Explained that patient would be a candidate for surgery. I showed her and her  her xrays and explained the deformity of her spine. This type of surgery is a multilevel fusion surgery and requires " about a year to fully recover from. Average time is a week in the hospital, a month in a rehab facility before going home, although each patient is different. If her quality of life is decreasing because of her pain, then surgery is a consideration. She would like to think about this further. She is welcome to see me to discuss surgery. We would get CT thoracic lumbar spine without contrast for planning prior to appointment.    I performed independent visualization of radiographic imaging and entered my own interpretation, reviewed and/or ordered tests in radiology, made the decision to obtain old records and/or history from someone other than the patient, and Reviewed and summarized old records and/or discussed this case with another health care provider          Again, thank you for allowing me to participate in the care of your patient.      Sincerely,    Deidre Vigil MD

## 2024-02-15 NOTE — PROGRESS NOTES
Neurosurgery Clinic Note    Chief Complaint: back pain    History of Present Illness:  It was a pleasure to evaluate Mily Hilliard in clinic today at the kind referral of Serafin Mccain DO  [address] [place] [address].    Mily Hilliard is a 73 year old female presenting with low back pain radiating to right leg, worse with prolonged standing or sitting, better with L4-5 TFESI and with moving around. She has constant foot and hand numbness after chemotherapy for breast cancer. Injections work for her leg pain, but she has needed several in past year per Dr. Mccain' records.  No prior spine surgery  No known scoliosis prior to recently.      Past Medical History:   Diagnosis Date    Arthritis     Breast cancer (H)     right breast- lumpectomy, lymph 15/19 pos - radiation and chemo    Hypercholesterolemia     Hypertension     Hypothyroidism        Past Surgical History:   Procedure Laterality Date    Bilateral Foot surgery      COLONOSCOPY  2014    Dr. Naylor [facility]    COLONOSCOPY N/A 2019    Procedure: COLONOSCOPY;  Surgeon: Catrachito Naylor MD;  Location:  GI    EYE SURGERY      cataract removal, macular hole repair    LUMPECTOMY BREAST  2000    Right       Social History     Socioeconomic History    Marital status:      Spouse name: None    Number of children: None    Years of education: None    Highest education level: None   Tobacco Use    Smoking status: Former     Packs/day: 0.00     Years: 0.00     Additional pack years: 0.00     Total pack years: 0.00     Types: Cigarettes     Quit date: 2003     Years since quittin.1    Smokeless tobacco: Never   Vaping Use    Vaping Use: Never used   Substance and Sexual Activity    Alcohol use: Not Currently     Alcohol/week: 11.7 standard drinks of alcohol     Types: 12 Standard drinks or equivalent per week    Drug use: No    Sexual activity: Not Currently     Partners: Male     Birth control/protection: None    Other Topics Concern    Parent/sibling w/ CABG, MI or angioplasty before 65F 55M? No   Social History Narrative    Lives with . In own home.    Four adult children. Grand- 7,     Work- retired business, retail.    Drives. Socializes.      Social Determinants of Health     Financial Resource Strain: Low Risk  (2/9/2024)    Financial Resource Strain     Within the past 12 months, have you or your family members you live with been unable to get utilities (heat, electricity) when it was really needed?: No   Food Insecurity: Low Risk  (2/9/2024)    Food Insecurity     Within the past 12 months, did you worry that your food would run out before you got money to buy more?: No     Within the past 12 months, did the food you bought just not last and you didn t have money to get more?: No   Transportation Needs: Low Risk  (2/9/2024)    Transportation Needs     Within the past 12 months, has lack of transportation kept you from medical appointments, getting your medicines, non-medical meetings or appointments, work, or from getting things that you need?: No   Physical Activity: Insufficiently Active (2/9/2024)    Exercise Vital Sign     Days of Exercise per Week: 3 days     Minutes of Exercise per Session: 30 min   Stress: No Stress Concern Present (2/9/2024)    [employer] - Occupational Stress Questionnaire     Feeling of Stress : Not at all   Social Connections: Unknown (2/9/2024)    Social Connection and Isolation Panel [NHANES]     Frequency of Social Gatherings with Friends and Family: Once a week   Interpersonal Safety: Low Risk  (2/13/2024)    Interpersonal Safety     Do you feel physically and emotionally safe where you currently live?: Yes     Within the past 12 months, have you been hit, slapped, kicked or otherwise physically hurt by someone?: No     Within the past 12 months, have you been humiliated or emotionally abused in other ways by your partner or ex-partner?: No    Housing Stability: Low Risk  (2/9/2024)    Housing Stability     Do you have housing? : Yes     Are you worried about losing your housing?: No       family history includes Bladder Cancer in her father; Breast Cancer in her mother and sister; Cancer - colorectal in her mother; Diabetes in her sister and sister; Macular Degeneration in her father; No Known Problems in her brother and brother; Stomach Cancer in her maternal grandfather.        IMAGING per my own measurement and interpretation:  Xrays:standing long cassette EOS 02/15/24  Significant coronal malalignment >3cm    ,  Severe sagittal plane malalignment      MRI lumbar 2/11/24  Severe right L4-5 cephalocaudal and dorsal-ventral foraminal stenosis due to collapse of disc height asymmetric due to scoliosis      Resulted Imaging/Labs:  Bone Density:  MR Lumbar Spine w/o Contrast    Result Date: 2/11/2024  EXAM: MR LUMBAR SPINE W/O CONTRAST LOCATION: [facility] DATE: 2/11/2024 INDICATION: Left lumbar radicular pain. COMPARISON: None. TECHNIQUE: Routine Lumbar Spine MRI without IV contrast. FINDINGS: Nomenclature is based on 5 lumbar type vertebral bodies. Lumbar spine lordosis is maintained. Lumbar vertebral body heights are maintained. Grade I retrolisthesis of L2 on L3, L3 on L4, and L4 on L5. Mixed Modic type I and type II degenerative endplate changes involving the left lateral apposing endplates at L2-L3 and L3-L4. Mixed Modic type I and type II degenerative endplate changes involving the right lateral apposing endplates at L4-L5. Rightward convex curvature of the lumbar spine. T2 hyperintense bilateral renal cysts. Normal distal spinal cord and cauda equina with conus medullaris at L1. No extraspinal abnormality. Unremarkable visualized bony pelvis. T12-L1: Disc desiccation and mild loss of disc space height. Circumferential disc osteophyte complex. Normal facets. No spinal canal stenosis or neural foraminal narrowing. L1-L2:  Disc desiccation and mild loss of disc space height. Circumferential disc osteophyte complex. Normal facets. No spinal canal stenosis or neural foraminal narrowing. L2-L3: Disc desiccation and mild loss of disc space height. Circumferential disc osteophyte complex. Normal facets. No spinal canal stenosis or neural foraminal narrowing. L3-L4: Disc desiccation and moderate loss of disc space height. Circumferential disc osteophyte complex. Normal facets. No spinal canal stenosis. No right and mild left neural foraminal stenosis. L4-L5: Disc desiccation and mild loss of disc space height. Circumferential disc osteophyte complex. Mild degenerative facet changes. Right-sided ligamentum flavum thickening. Severe right and no significant left neural foraminal stenosis. L5-S1: Disc desiccation and mild loss of disc space height. Minimal circumferential disc osteophyte complex. Moderate right and mild left degenerative facet changes. No spinal canal stenosis or neural foraminal narrowing.     IMPRESSION: 1.  Mild to moderate lumbar spondylosis without significant lumbar spinal canal narrowing. 2.  At L4-L5, there is circumferential disc interbody spurring and hypertrophic facet changes contributing to severe right neural foraminal stenosis with deformity of the exiting right L4 nerve root. 3.  Multilevel Modic type I degenerative endplate changes involving the apposing endplates at L2-L3, L3-L4, and L4-L5.    PAIN Transforaminal [facility] Inj Lumbosacral One Level Right    Result Date: 1/10/2024  LUMBAR EPIDURAL STEROID INJECTION TRANSFORAMINAL APPROACH WITH FLUOROSCOPIC GUIDANCE Performed on: 1/10/24 Pre Procedure Diagnosis:  LBP, Lumbar radiculitis Post Procedure Diagnosis:  Same Procedure Performed:  Lumbar Transforaminal Epidural Steroid Injection with Fluoroscopic Guidance Clinical Scenario:  As per office notes Anesthesia/Fluids:  As per intra-procedure documentation Vital Signs:  As per intra-procedure documentation Level  Injected: L4-5 Side Injected: Right CC: Mily Hilliard  is a 73 year-old female who presents today for a right L4-5 transforaminal epidural steroid injection as ordered by Dr. Mccain.  This was switched from an L4-5 interlaminar as there was difficulty with access.  The patient complains of right lower extremity pain.   Lumbar MRI was reviewed.  The patient wanted to proceed with the injection today.  The patient denies feeling ill today or having an active infection (denies taking antibiotics).  The patient does not take any prescription blood thinning medications.  The patient denies any allergies to contrast.    The procedure of epidural steroid injection was discussed in detail along with the attendant risks, including but not limited to: infection, bleeding, nerve injury, paralysis.   The patient's questions were answered and they understood the information given.   The patient elected to proceed and signed informed consent.  . The patient was placed in a prone position on the fluoroscopy table. A procedural pause was conducted to verify: correct patient identity, procedure to be performed and as applicable, correct side and site, correct patient position, and availability of implants, special equipment and special requirements.  The lower back was prepped and draped in usual fashion.  After anesthetizing the skin with 1% lidocaine, a 3.5 inch, 22 gauge needle was introduced at the Right L4 pedicle under fluoroscopic guidance.  After aspiration was negative, 1 mL of  Omnipaque 300 was injected under continuous fluoroscopy.  Epidural flow without vascular uptake was seen.  1 mL of 1% lidocaine was injected as a test dose. After an appropriate period of observation, a directed neurological exam was performed which revealed no new neurologic deficits.    Subsequently, 10 mgs of Dexamethasone was slowly injected. Following the injection the needle was withdrawn slightly and flushed with local anesthetic as it  was fully extracted.  The patient tolerated the procedure well and there were no apparent complications.  The patient did not develop any new neurologic deficits.  After appropriate observation, the patient was dismissed in good condition under their own power. PRE-PROCEDURE PAIN SCORE:  5/10 POST-PROCEDURE PAIN SCORE:  0/10 The patient tolerated the procedure well.  The patient was instructed to call the [facility] if any questions or concerns arise after the procedure. After a short period of observation the patient was discharged.  The patient will follow-up with Dr. Mccain in 2 to 4 weeks.     PAIN Transforaminal [facility] Inj Lumbosacral One Level Right    Result Date: 12/27/2023  LUMBAR EPIDURAL STEROID INJECTION TRANSFORAMINAL APPROACH WITH FLUOROSCOPIC GUIDANCE Performed on: 12/27/23 Ordered by: Serafin Mccain DO [id number] [facility] [id number] Pre Procedure Diagnosis:  LBP, Lumbar radiculitis Post Procedure Diagnosis:  Same Procedure Performed:  Lumbar Transforaminal Epidural Steroid Injection with Fluoroscopic Guidance Clinical Scenario:  As per office notes Anesthesia/Fluids:  As per intra-procedure documentation Vital Signs:  As per intra-procedure documentation Level Injected:  L4-L5 Side Injected: Right Injectate: None (injection not completed) Mily Hilliard is a 73 year old female who presents today for a lumbar transforaminal epidural steroid injection as ordered.  The patient complains of low back and leg pain. MRI was reviewed today. The patient denies feeling ill today or having an active infection (denies taking antibiotics). The patient denies any allergies to contrast. The procedure of epidural steroid injection was discussed in detail along with the attendant risks, including but not limited to:  back pain, subdural puncture with a subsequent headache (possible need for epidural blood patch), nerve injury, infection, bleeding, epidural hematoma (with need for surgical evacuation),  "allergic reaction,  worsening of pain along with risk of stroke, paralysis and death. Potential benefits including improvements in pain, function, and sleep were also discussed. Alternatives were offered. The patient was given an opportunity to ask any questions and clarify their understanding. The patient decided to proceed and signed informed consent. The patient denies any symptoms of an active infection and denies taking antibiotics. The patient denies taking any prescription blood thinning medications, or if prescribed they have been held . The patient denies any allergies to iodine or iodine contrast. The proceduralist wore sterile gloves and a mask for the procedure, and all other personnel in the room wore a mask. The patient was placed in a prone position on the fluoroscopy table. A procedural time-out was conducted to verify: correct patient identity, procedure to be performed and as applicable, correct side and site, correct patient position, and availability of implants, special equipment and special requirements.  The lower back was prepped and draped in usual fashion. The fluoroscope was adjusted and optimized for coaxial needle advancement in an oblique approach. After anesthetizing the skin with 1% lidocaine, a 5\", 22 gauge needle was introduced and advanced under fluoroscopic guidance. Multiplanar imaging confirmed correct needle placement. After aspiration was negative, 1 mL of  Omnipaque 300 was injected under live fluoroscopy.  Flow was noted to be possibly going epidurally but also appeared to be going into the disc, so decision was made to stop procedure. No medication was injected and the needle was withdrawn..  The patient tolerated the procedure well and there were no apparent complications.  The patient did not develop any new neurologic deficits.  After appropriate observation, the patient was returned to the recovery area in good condition under their own power. Patient was advised of the " above outcome and our decision to halt the procedure as a safety measure. Patient will be given Keflex 500mg 4 times daily for 7 days and was told to call us if any new neurologic deficit, fever, chills, or worsening back pain. The patient was instructed to call the [facility] if any questions or concerns arise after the procedure. After a short period of observation the patient was discharged.     Pain Transforaminal [facility] Inj Lmbscrl 1 Lvl Rt    Result Date: 8/16/2023  LUMBAR EPIDURAL STEROID INJECTION TRANSFORAMINAL APPROACH WITH FLUOROSCOPIC GUIDANCE Performed on: 8/16/23 Pre Procedure Diagnosis:  LBP, Lumbar radiculitis Post Procedure Diagnosis:  Same Procedure Performed:  Lumbar Transforaminal Epidural Steroid Injection with Fluoroscopic Guidance Clinical Scenario:  As per office notes Anesthesia/Fluids:  As per intra-procedure documentation Vital Signs:  As per intra-procedure documentation Level Injected: L4-5 Side Injected: Right CC: Mily Hilliard is a 73 year-old female who presents today for a right L4-5 transforaminal epidural steroid injection as ordered by Dr. Mccain.  The patient complains of right lower extremity pain.   Lumbar MRI was reviewed.  The patient wanted to proceed with the injection today.  The patient denies feeling ill today or having an active infection (denies taking antibiotics).  The patient does not take any prescription blood thinning medications.  The patient denies any allergies to contrast.    The procedure of epidural steroid injection was discussed in detail along with the attendant risks, including but not limited to: infection, bleeding, nerve injury, paralysis.   The patient's questions were answered and they understood the information given.   The patient elected to proceed and signed informed consent.  . The patient was placed in a prone position on the fluoroscopy table. A procedural pause was conducted to verify: correct patient identity, procedure to be  performed and as applicable, correct side and site, correct patient position, and availability of implants, special equipment and special requirements.  The lower back was prepped and draped in usual fashion.  After anesthetizing the skin with 1% lidocaine, a 3.5 inch, 22 gauge needle was introduced at the Right L4 pedicle under fluoroscopic guidance.  After aspiration was negative, 1 mL of  Omnipaque 300 was injected under continuous fluoroscopy.  Epidural flow without vascular uptake was seen.  1 mL of 1% lidocaine was injected as a test dose. After an appropriate period of observation, a directed neurological exam was performed which revealed no new neurologic deficits.    Subsequently, 10 mgs of Dexamethasone was slowly injected. Following the injection the needle was withdrawn slightly and flushed with local anesthetic as it was fully extracted.  The patient tolerated the procedure well and there were no apparent complications.  The patient did not develop any new neurologic deficits.  After appropriate observation, the patient was dismissed in good condition under their own power. PRE-PROCEDURE PAIN SCORE:  6/10 POST-PROCEDURE PAIN SCORE:  1/10 The patient tolerated the procedure well.  The patient was instructed to call the [facility] if any questions or concerns arise after the procedure. After a short period of observation the patient was discharged.  Patient will follow-up with Dr. Mccain in 2 to 4 weeks.    MA Screening Digital Bilateral    Result Date: 6/28/2023  BILATERAL FULL FIELD DIGITAL SCREENING MAMMOGRAM Performed on: 6/28/23 Compared to: 06/22/2022, 06/15/2021, 06/01/2020, and 03/29/2019 Technique: This study was evaluated with the assistance of Computer-Aided Detection. Findings: The breasts have scattered areas of fibroglandular density. There are post-surgical changes in the right breast. There is no radiographic evidence of malignancy.     IMPRESSION: ACR BI-RADS Category 2:  Benign RECOMMENDED FOLLOW-UP: Annual routine screening mammogram The results and recommendations of this examination will be communicated to the patient. Dale Mccauley MD     XR Toe Right G/E 2 Views    Result Date: 6/8/2023  XR TOE RIGHT G/E 2 VIEWS 6/8/2023 9:32 AM HISTORY: right second toe pain, ulcer, bony widening around the proximal interphalangeal joint; Ulcer of right second toe, limited to breakdown of skin (H); Toe pain, right COMPARISON: None.     IMPRESSION: There is osseous demineralization. No acute fracture is seen. There is arthropathy at the second proximal interphalangeal joint with dorsal subluxation of the second middle phalanx. No definite evidence of osteomyelitis radiographically. If there is persistent concern, MRI would be more sensitive. MIGUE HIGHTOWER MD   SYSTEM ID:  [id number]    US MYRTLE Doppler with Exercise Bilateral    Result Date: 6/2/2023  IR MYRTLE US MYRTLE DOPPLER WITH EXERCISE BILATERAL   6/2/2023 9:50 AM HISTORY: decreased pulses with leg cramping; Decreased pulses in feet COMPARISON: None. FINDINGS: Right MYRTLE: DP: 1.04 PT: 1.06. Left MYRTLE: DP: 1.03 PT: 1.07. Waveforms: Triphasic in the distal tibial arteries Exercise: The patient walked on a treadmill for 5 minutes at 1.5 miles per hour and at a 10% grade. At 1 minute 30 seconds, the patient had left leg cramping. At 4 minutes 20 seconds, the patient had left foot cramping. Right exercise MYRTLE: 1.17. Left exercise MYRTLE: 1.20     IMPRESSION: Ankle-brachial indices are within normal limits. Findings do not indicate significant lower extremity arterial insufficiency. MYRTLE CRITERIA: >0.95 Normal 0.90 - 0.94 Mild 0.5 - 0.89 Moderate 0.2 - 0.49 Severe <0.2 Critical EVELYN ELDER MD   SYSTEM ID:  [id number]    XR Thoracic Spine 3 Views    Result Date: 6/2/2023  THORACIC SPINE THREE VIEWS 6/2/2023 9:02 AM COMPARISON: None. HISTORY: Thoracolumbar pain, evaluate for fracture. Lumbar spine pain. Pain in thoracic spine. FINDINGS: Straightening of  usual thoracic kyphosis and mild leftward curvature. Mild rightward scoliosis and accentuation of kyphosis at the thoracolumbar junction. Otherwise normal alignment. Normal vertebral body heights without discrete compression fracture. Moderate disc space narrowing at T12-L1 and L1-L2. Moderate marginal osteophytes. The disc spaces are otherwise relatively maintained within the thoracic spine. The visualized ribs and intrathoracic structures are unremarkable. RO GOMEZ MD   SYSTEM ID:  [id number]    PAIN Transfor[facility]inal [facility] Inj Lumbosacral Right    Result Date: 4/6/2023  LUMBAR EPIDURAL STEROID INJECTION TRANSFORAMINAL APPROACH WITH FLUOROSCOPIC GUIDANCE Performed on: 4/6/23 Pre Procedure Diagnosis:  LBP, Lumbar radiculitis Post Procedure Diagnosis:  Same Procedure Performed:  Lumbar Transforaminal Epidural Steroid Injection with Fluoroscopic Guidance Clinical Scenario:  As per office notes Anesthesia/Fluids:  As per intra-procedure documentation Vital Signs:  As per intra-procedure documentation Level Injected: Right L4-5 Side Injected: Right CC: Mily ELZA Hilliard is a 72 year-old female who presents today for a  right L4-5 transforaminal epidural steroid injection as ordered by Dr. Mccain.  The patient complains of right lower extremity pain.   Lumbar MRI was reviewed today and shows L4-5 foraminal stenosis.  The patient wanted to proceed with the injection today.  The patient denies feeling ill today or having an active infection (denies taking antibiotics).  The patient does not take any prescription blood thinning medications.  The patient denies any allergies to contrast.    The procedure of epidural steroid injection was discussed in detail along with the attendant risks, including but not limited to: infection, bleeding, nerve injury, paralysis.   The patient's questions were answered and they understood the information given.   The patient elected to proceed and signed informed consent.  . The patient  was placed in a prone position on the fluoroscopy table. A procedural pause was conducted to verify: correct patient identity, procedure to be performed and as applicable, correct side and site, correct patient position, and availability of implants, special equipment and special requirements.  The lower back was prepped and draped in usual fashion.  After anesthetizing the skin with 1% lidocaine, a 3.5 inch, 22 gauge needle was introduced at the Right L4 pedicle under fluoroscopic guidance.  After aspiration was negative, 1 mL of  Omnipaque 300 was injected under continuous fluoroscopy.  Epidural flow without vascular uptake was seen.  1 mL of 1% lidocaine was injected as a test dose. After an appropriate period of observation, a directed neurological exam was performed which revealed no new neurologic deficits.    Subsequently, 10 mgs of Dexamethasone was slowly injected. Following the injection the needle was withdrawn slightly and flushed with local anesthetic as it was fully extracted.  The patient tolerated the procedure well and there were no apparent complications.  The patient did not develop any new neurologic deficits.  After appropriate observation, the patient was dismissed in good condition under their own power. PRE-PROCEDURE PAIN SCORE:  6/10 POST-PROCEDURE PAIN SCORE:  0/10 The patient tolerated the procedure well.  The patient was instructed to call the [facility] if any questions or concerns arise after the procedure. After a short period of observation the patient was discharged.  Patient will follow up with Dr. Mccain in 2 to 4 weeks.    XR Foot Right G/E 3 Views    Result Date: 3/18/2023  For Patients: As a result of the 21st Century Cures Act, medical imaging exams and procedure reports are released immediately into your electronic medical record. You may view this report before your referring provider. If you have questions, please contact your health care provider. EXAM: XR  FOOT 3 VIEWS RIGHT LOCATION: [facility] DATE/TIME: 3/18/2023 11:48 AM INDICATION: TOE PAIN/PROBLEM COMPARISON: None.    Normal joint spaces and alignment. No fracture.    PAIN Medial Branch Block Lumbar Two Levels Right    Result Date: 3/16/2023  DIAGNOSTIC LUMBAR MEDIAL BRANCH BLOCKS WITH FLUOROSCOPIC GUIDANCE Performed on: 3/16/23 Pre Procedure Diagnosis:  Lumbar spondylosis, LBP, Lumbar Facet Syndrome Post Procedure Diagnosis:  Same Procedure Performed:  Diagnostic Lumbar Medial Branch Block with Fluoroscopic Guidance  Clinical Scenario:  As per office notes Anesthesia/Fluids:  As per intra-procedure documentation Vital Signs:  As per intra-procedure documentation Level Injected:  L3, L4 and L5 Side Injected: Right CC: Mily ELZA Hilliard is a 72 year-old female who presents today for right L3, L4, L5.   The patient complains of low back pain without significant radicular symptoms.  The patient understands that these are diagnostic injections only and will not provide any long lasting relief.  Further treatment options can potentially be offered, depending on the amount of pain relief with today's injections.  She wanted to proceed.  Advanced imaging is reviewed today.  The procedure of lumbar medial branch block was discussed in detail along with the attendant risks, including but not limited to:  back pain, nerve injury, infection, bleeding, worsened pain, allergic reaction.  The patient was also informed that there may be the need for additional injections (or other interventions, such as: facet joint injections,radiofrequency procedures..) depending on response to the injections today.  The patient decided to proceed and signed informed consent. The patient denies any symptoms of an active infection and denies taking antibiotics. The patient denies taking any prescription blood thinning medications. The patient denies any allergies to iodine or iodine contrast.  The patient was placed  in the prone  position in the fluoroscopy table. A procedural pause was performed to verify the patient's name and  as well as the site and side of the injection. The low back was then  prepped and draped in the usual sterile fashion.  After localizing the anatomy under fluoroscopy 3.5 inch,25 gauge spinal needles were introduced into the right L3, L4, L5 medial branch levels mentioned above under fluoroscopic guidance.   After aspiration was negative for blood or CSF, approximately 0.1 ml of Omnipaque 300 was injected at each site.  There was no vascular uptake.  Subsequently, 0.5 ml of 0.5% bupivacaine was injected at each level.  The needles were withdrawn without flushing.  Pre-procedure pain score:  7/10 Post-procedure pain score:  5/10 Pain diary given. The patient tolerated the procedure well and instructed in the use of a pain diary.  If there are questions or concerns that arise, the patient was instructed to call the [facility] spine clinic.  After a short period of observation, the patient was discharged.  The patient will be contacted with the next steps in care after the pain diary is received and reviewed.     MR Lumbar Spine w/o Contrast    Result Date: 2023  MRI LUMBAR SPINE WITHOUT CONTRAST January 10, 2023 2:44 PM HISTORY: Worsening low back pain, history of compression fracture and degenerative disease. Chronic right-sided low back pain without sciatica. Compression fracture of L1 vertebra, initial encounter (H). TECHNIQUE: Multiplanar multisequence MRI of the lumbar spine without contrast. COMPARISON: None. FINDINGS: Alignment is significant for thoracolumbar scoliosis and multilevel grade 1 spondylolisthesis. Bone marrow demonstrates scattered mixed degenerative endplate changes. Conus medullaris is unremarkable terminating at the level of the L1-L2 disc. Cauda equina is unremarkable. Bilateral sacroiliac joint degenerative change. Small areas of nonspecific T2 hyperintense signal within the bilateral  kidneys and liver which are incompletely characterized but statistically likely benign. Segmental Analysis: T12-L1: Mild disc height loss. Disc bulge with central protrusion/annular fissure. Mild bilateral facet arthropathy. No foraminal or spinal canal stenosis. L1-L2: Mild disc height loss. Disc bulge. Mild bilateral facet arthropathy. No foraminal or spinal canal stenosis.  L2-L3: Mild disc height loss. Disc bulge with annular fissure. Mild bilateral facet arthropathy. No foraminal or spinal canal stenosis. Slight asymmetric left lateral recess related to focal left central bulge.  L3-L4: Severe disc height loss. Disc bulge. Mild bilateral facet arthropathy. Mild bilateral foraminal stenosis. Mild, if any, spinal canal stenosis.  L4-L5: Mild disc loss. Disc bulge, asymmetric to the right. Mild bilateral facet arthropathy. Severe right foraminal stenosis. No left foraminal stenosis. No spinal canal stenosis.  L5-S1: Mild disc height loss. Disc bulge. Moderate bilateral facet arthropathy. No foraminal or spinal canal stenosis.     IMPRESSION:  1. Multilevel degenerative change as detailed. 2. Foraminal stenosis is severe on the right at L4-L5. AUGUSTO JONES MD   SYSTEM ID:  [id number]    XR Lumbar Spine 2/3 Views    Result Date: 12/29/2022  EXAM: XR LUMBAR SPINE 2/3 VIEWS LOCATION: [facility] DATE/TIME: 12/28/2022 6:14 PM INDICATION: lower back pain COMPARISON: None. TECHNIQUE: CR Lumbar Spine.     IMPRESSION: No fracture. Normal vertebral heights. There is thoracolumbar dextroscoliosis centered at L2. Evans angle measures 27 degrees between the superior endplates of T12 and L5. There is reversal of the normal thoracolumbar AP curvature. There is moderately advanced multilevel disc height loss. There is moderately advanced multilevel facet hypertrophy. Aortic atherosclerosis is present    XR Shoulder Right G/E 3 Views    Result Date: 12/28/2022  EXAM: XR SHOULDER RIGHT G/E 3 VIEWS LOCATION:   [facility] DATE/TIME: 2022 6:18 PM INDICATION: Acute pain of right shoulder. COMPARISON: 2020 radiograph of the chest.     IMPRESSION: Mild glenohumeral and acromioclavicular joint degenerative change. There is no evidence of an acute fracture. Chronic appearing area of calcification/mineralization and surgical clips in the right chest wall/axillary region. Atherosclerotic vascular calcifications.    DX Hip/Pelvis/Spine w Lateral    Result Date: 2022  BONE DENSITOMETRY [address] [address] 2022   PATIENT: Mily Hilliard CHART: [id number] :  1950 AGE:  71 year old SEX:  female REFERRING PROVIDER:  Meri Pacheco MD   PROCEDURE:  Bone density scanning was performed using DXA technology of the lumbar spine and hip.  Scanning was performed on a Lunar Prodigy scanner.  Reporting is completed in the form of a T-score.  The T-score represents the standard deviation from peak bone mass based on a young healthy adult.   REFERENCE T-SCORES:     Normal                -1.0 and greater                               Osteopenia         Between -1.0 and -2.5                                         Osteoporosis     -2.5 and less                                   RISK FACTORS:  Post-menopausal, Follow-up osteopenia CURRENT TREATMENT:  Calcium   FINDINGS:              Lumbar Spine (L1-L2)      T-score:  -1.5, marked degenerative changes present att L3,4, so only L1,2 are evaluated.              Left Femoral Neck            T-score:  -1.2              Right Femoral Neck          T-score:  -2.1                           Lumbar (L1-L2) BMD: 0.989  Previous: 1.016                        Total Hip Mean BMD: 0.842  Previous: 0.847   Comparison is made to another DXA performed on the same Lunar Prodigy  machine on 2018.  LATERAL VERTEBRAL ASSESSMENT Procedure:  Vertebral fracture assessment was performed in the lateral decubitus  "position using a Lunar Prodigy  densitometer. Indications for VFA: T-score of -1.0 or worse and age (female>69) Confounding factors for VFA: None.  The LVA scan is interpretable from T9 to L5. VFA Findings: Using the semi-quantitative analysis of Genant there was evidence of spinal deformity as follows:  moderate (grade 2) biconcave fracture of L1 VFA Impression: Mily Hilliard has one vertebral fracture identified on the LVA.  If alternative etiologies for the presence of vertebral fractures are excluded, the diagnosis is consistent with severe osteoporosis. IMPRESSION Severe osteoporosis on the basis of vertebral fracture. Degenerative changes at the lumbar spine may falsely elevat results. There has been no significant change in bone density of the lumbar spine. There has been no significant change in bone density of the hip(s). Recommendations include ensuring adequate Calcium and Vitamin D. The current NOF Guidelines recommend treatment for patients with prior hip or vertebral fracture, T-score -2.5 or below, or 10 year risk of any major osteoporotic fracture 20% or greater, or 10 year risk of hip fracture 3% or greater as calculated using the FRAX calculator (www.shef.ac.uk/FRAX or you can google \"FRAX\").  Based on these guidelines, treatment (in addition to calcium and vitamin D) is recommended for this patient, after ruling out other causes of osteoporosis. This is meant as an aid to clinical decision making; one must still use clinical judgement. Follow up can be considered in 2 years. ___________________ Alpa Donald M.D. Electronically signed       *MA Screening Digital Bilateral    Result Date: 6/22/2022  BILATERAL FULL FIELD DIGITAL SCREENING MAMMOGRAM Performed on: 6/22/22 Compared to: 06/15/2021, 06/01/2020, 03/29/2019, and 03/26/2018 Technique: This study was evaluated with the assistance of Computer-Aided Detection. Findings: The breasts have scattered areas of fibroglandular density. There are " "breast conservation changes in the right breast. There is no radiographic evidence of malignancy. IMPRESSION: ACR BI-RADS Category 2: Benign RECOMMENDED FOLLOW-UP: Annual routine screening mammogram The results and recommendations of this examination will be communicated to the patient.     US Abdomen Limited    Result Date: 2/21/2022  ULTRASOUND ABDOMEN LIMITED  2/21/2022 9:10 AM CLINICAL HISTORY: Elevated liver enzymes. TECHNIQUE: Limited abdominal ultrasound. COMPARISON: None. FINDINGS: GALLBLADDER: The gallbladder is normal. No gallstones, wall thickening, or pericholecystic fluid. Negative sonographic Christian's sign. BILE DUCTS: There is no biliary dilatation. The common duct measures 5 mm. LIVER: Fatty liver. Liver is 15 cm. Small fat sparing near the gallbladder. RIGHT KIDNEY: No hydronephrosis. PANCREAS: The visualized portions of the pancreas are normal. No ascites.     IMPRESSION: 1.  No acute abnormality. 2.  Fatty infiltration of the liver. Mildly enlarged liver. RO LÓPEZ MD   SYSTEM ID:  [id number]      Vitamin D:  Vitamin D Deficiency Screening Results:  Lab Results   Component Value Date    VITDT 51 01/27/2023    VITDT 40 07/14/2022     No results found for: \"CKP529\", \"HUNT368\", \"VJPX84EKTLG\", \"VITD3\", \"D2VIT\", \"D3VIT\", \"DTOT\", \"EG45916603\", \"[id number]\", \"[id number]\", \"[id number]\", \"[id number]\", \"[id number]\"      Nutritional Status:  Estimated body mass index is 22.78 kg/m  as calculated from the following:    Height as of this encounter: 1.615 m (5' 3.58\").    Weight as of this encounter: 59.4 kg (131 lb).    Lab Results   Component Value Date    ALBUMIN 4.4 02/13/2024    ALBUMIN 3.6 02/09/2022    ALBUMIN 4.0 11/12/2020       Diabetes Screening:  Lab Results   Component Value Date    A1C 6.1 02/09/2022       Nicotine Usage:    No                Physical Exam   BP (!) 147/82   Pulse 60   Resp 16   Ht 1.615 m (5' 3.58\")   Wt 59.4 kg (131 lb)   SpO2 99%   BMI 22.78 kg/m    Constitutional: " Oriented to person, place, and time. Appears well-developed and well-nourished. Cooperative. No distress.     Neurological: alert and oriented to person, place, and time.   sensory deficit bilateral hands and feet  Gait shuffling    Reflex Scores:           Bicep reflexes are 2+ on the right side and 2+ on the left side.       Brachioradialis reflexes are 2+ on the right side and 2+ on the left side.       Patellar reflexes are 1 on the right side and 1 on the left side.       Achilles reflexes are 1 on the right side and 1 on the left side.    STRENGTH LEFT RIGHT   Deltoid 5 5   Bicep 5 5   Wrist Extensor 5 5   Tricep 5 5   Finger flexion 5 5   Finger abduction 5 5    5 5       Hip Flexion     4     4   Knee Extension 5 5   Ankle Dorsiflexion 5 5   Extensor Hallucis Longus 5 5   Plantar Flexion 5 5   Foot eversion 5 5   Foot inversion 5 5     Sacroiliac Joint Exam LEFT RIGHT   Fabian Finger Test + +   PSIS tenderness - -   ThighThrust - -   DEVAN + +   Pelvic Gapping - -   Pelvic Compression + -   Gaenslen s - -   Sacral Thrust - -   Hip Exam     Posterior impingement - -   Medial femoral impingement - -         Skin: Skin is warm, dry and intact.   Psychiatric: Normal mood and affect. Speech is normal and behavior is normal.        ASSESSMENT:  Mily Hilliard is a 73 year old female with kyphoscoliosis, severe sagittal malaligmnent, coronal malalignment, lumbar radiculopathy    PLAN:  Explained that patient would be a candidate for surgery. I showed her and her  her xrays and explained the deformity of her spine. This type of surgery is a multilevel fusion surgery and requires about a year to fully recover from. Average time is a week in the hospital, a month in a rehab facility before going home, although each patient is different. If her quality of life is decreasing because of her pain, then surgery is a consideration. She would like to think about this further. She is welcome to see me to discuss  surgery. We would get CT thoracic lumbar spine without contrast for planning prior to appointment.        Deidre Vigil MD    [facility] Department of Neurosurgery  Complex Spinal Deformity, Scoliosis, and Minimally Invasive Spine Surgery Specialist  Office: [phone]    2/15/2024        I performed independent visualization of radiographic imaging and entered my own interpretation, reviewed and/or ordered tests in radiology, made the decision to obtain old records and/or history from someone other than the patient, and Reviewed and summarized old records and/or discussed this case with another health care provider

## 2024-03-11 ENCOUNTER — OFFICE VISIT (OUTPATIENT)
Dept: PHYSICAL MEDICINE AND REHAB | Facility: CLINIC | Age: 74
End: 2024-03-11
Payer: COMMERCIAL

## 2024-03-11 VITALS — SYSTOLIC BLOOD PRESSURE: 125 MMHG | DIASTOLIC BLOOD PRESSURE: 58 MMHG | HEART RATE: 55 BPM

## 2024-03-11 DIAGNOSIS — M48.061 FORAMINAL STENOSIS OF LUMBAR REGION: ICD-10-CM

## 2024-03-11 DIAGNOSIS — M41.25 OTHER IDIOPATHIC SCOLIOSIS, THORACOLUMBAR REGION: ICD-10-CM

## 2024-03-11 DIAGNOSIS — M43.8X9 SAGITTAL PLANE IMBALANCE: ICD-10-CM

## 2024-03-11 DIAGNOSIS — M54.16 LUMBAR RADICULAR PAIN: Primary | ICD-10-CM

## 2024-03-11 DIAGNOSIS — G62.9 POLYNEUROPATHY: ICD-10-CM

## 2024-03-11 PROCEDURE — 99214 OFFICE O/P EST MOD 30 MIN: CPT | Performed by: PHYSICAL MEDICINE & REHABILITATION

## 2024-03-11 RX ORDER — PREGABALIN 25 MG/1
25 CAPSULE ORAL 3 TIMES DAILY
Qty: 90 CAPSULE | Refills: 2 | Status: SHIPPED | OUTPATIENT
Start: 2024-03-11 | End: 2024-06-25

## 2024-03-11 ASSESSMENT — PAIN SCALES - GENERAL: PAINLEVEL: MODERATE PAIN (4)

## 2024-03-11 NOTE — LETTER
3/11/2024         RE: Mily Hilliard  [address]  [place] [address]        Dear Colleague,    Thank you for referring your patient, Mily Hilliard, to the [facility]. Please see a copy of my visit note below.    Assessment/Plan:      Mily was seen today for back pain.    Diagnoses and all orders for this visit:    Lumbar radicular pain  -     pregabalin (LYRICA) 25 MG capsule; Take 1 capsule (25 mg) by mouth 3 times daily    Foraminal stenosis of lumbar region    Other idiopathic scoliosis, thoracolumbar region    Polyneuropathy  -     pregabalin (LYRICA) 25 MG capsule; Take 1 capsule (25 mg) by mouth 3 times daily    Sagittal plane imbalance         Assessment: Pleasant 73 year old female with a history of Hypothyroidism,with a history of hypertension, with a history of hypertension, hypothyroidism, breast cancer treated with chemotherapy and subsequent polyneuropathy, hyperlipidemia, osteoporosis with:     1.  Persistent right lumbar spine and right lower extremity proximal radicular pain in an L4 distribution.  She has severe right L4-5 foraminal stenosis related to degenerative disc disease and facet arthropathy in the setting of lumbar scoliosis.  Right lumbar radicular pain has been managed with a right L4-5 TFESI x 2 over the past year.  Failed medial branch blocks in 2023.  She also has had physical therapy in the past.  EMG has been negative for radiculopathy.  Failed gabapentin.  Taking Tylenol at bedtime.  Best benefit is a right L4-5 TFESI most recently done January 10, 2024.  Not a great surgical candidate per Dr. Vigil due to complexity of the surgery.  Tolerating pregabalin 25 mg twice daily slightly helpful with pain.        2.  Thoracolumbar pain likely multifactorial related to right-sided lumbar scoliosis, facet arthropathy, sacroiliac joint pain in setting of sagittal plane imbalance and flatback syndrome.   No improvement with physical therapy  and gabapentin.  Pain remains consistent with right-sided facet arthropathy.  No specific radicular pain.  No improvement with medial branch blocks.    some improvement following right L4-5 TFESI done in August, December 2023 and January 10, 2024.      3.  Bilateral lower extremity paresthesias consistent with peripheral polyneuropathy.  No benefit with gabapentin.  This is persistent but tolerable.  No improvement with gabapentin.  Some improvement with Lyrica.     4.  Cramping in the lower extremities likely related to polyneuropathy she has normal ankle-brachial indexes.      Discussion:    1.  I discussed the diagnosis and treatment options.  We discussed her visit with Dr. Vigil and she is not wanting surgery.  We discussed that medial branch blocks were not helpful and the facet arthropathy at L4-5 likely is not significant contributor more related to the foraminal stenosis given her radicular pain. .We discussed options of further injections, changing Lyrica, spinal cord stimulator     2.  After discussion patient and I agreed that her goal is to maintain her current level of function.    she is applying well to Lyrica 25 mg twice daily may be slightly more drowsy/side effects.  We discussed increasing Lyrica to 25 mg 3 times daily and new prescription is provided and we will see how this goes.    3.  Can consider further right L4-5 TFESI or spinal cord stimulator in the future.    4.  Follow-up with me in 2 months.      It was our pleasure caring for your patient today, if there any questions or concerns please do not hesitate to contact us.    30 minutes were spent on the date of the encounter performing chart review, patient visit and documentation in addition to any procedure.    Subjective:   Patient ID: Mily Hilliard is a 73 year old female.    History of Present Illness: Patient presents with her  today provide some of the history.  She continues to have right-sided low back pain gluteal pain  and occasional right knee pain with standing or sitting for prolonged periods better with walking and lying down.  Taking Lyrica 25 mg twice daily.  At night she has no pain overnight but in the afternoon she starts having pain again.  Maybe 20% improved with Lyrica pain is 9/10 at worst 4/10 today and at best.  Takes Tylenol as well.  Since last visit was seen by Dr. Vigil.  She does have constant low back pain on the right however.  In review of record she did try medial branch blocks March of last year without benefit.    I reviewed the note from Dr. Vigil in neurosurgery from February.  I appreciate the thorough workup/evaluation.  She does have an option for a multilevel fusion which would be fairly extensive.  Require a year to fully recover a month in the hospital.  Patient is considering options.    Imaging: Full body EOS plain films were reviewed.  Scoliotic curve apex T10 and moderate to the right apex L2 positive sagittal imbalance negative coronal balance.    I reviewed the MRI lumbar spine showing severe facet arthropathy in the right L4-5 with along with severe right foraminal stenosis.  No other central stenosis.    Review of Systems: Pertinent positives: As paresthesias and weakness right leg.  Pertinent negatives:  .  No bowel or bladder incontinence.  No urinary retention.  No fevers, unintentional weight loss, balance changes, headaches, frequent falling, difficulty swallowing, or coordination difficulties.  All others reviewed are negative.    Past Medical History:   Diagnosis Date     Arthritis      Breast cancer (H) 2000    right breast- lumpectomy, lymph 15/19 pos - radiation and chemo     Hypercholesterolemia      Hypertension      Hypothyroidism        The following portions of the patient's history were reviewed and updated as appropriate: allergies, current medications, past family history, past medical history, past social history, past surgical history and problem list.            Objective:   Physical Exam:    /58   Pulse 55   There is no height or weight on file to calculate BMI.      General: Alert and oriented with normal affect. Attention, knowledge, memory, and language are intact. No acute distress.   Eyes: Sclerae are clear.  Respirations: Unlabored. CV: No lower extremity edema.       Sensation is intact to light touch throughout the   lower extremities.  Reflexes are   1+ patellar and 0 Achilles with downgoing toes.    Manual muscle testing reveals:  Right /Left out of 5     5/5 hip flexors  5/5 knee flexors  5/5 knee extensors  5/5 ankle plantar flexors  5/5 ankle dorsiflexors  5/5  EHL       Again, thank you for allowing me to participate in the care of your patient.        Sincerely,        Serafin Mccain, DO

## 2024-03-11 NOTE — PROGRESS NOTES
Assessment/Plan:      Mily was seen today for back pain.    Diagnoses and all orders for this visit:    Lumbar radicular pain  -     pregabalin (LYRICA) 25 MG capsule; Take 1 capsule (25 mg) by mouth 3 times daily    Foraminal stenosis of lumbar region    Other idiopathic scoliosis, thoracolumbar region    Polyneuropathy  -     pregabalin (LYRICA) 25 MG capsule; Take 1 capsule (25 mg) by mouth 3 times daily    Sagittal plane imbalance         Assessment: Pleasant 73 year old female with a history of Hypothyroidism,with a history of hypertension, with a history of hypertension, hypothyroidism, breast cancer treated with chemotherapy and subsequent polyneuropathy, hyperlipidemia, osteoporosis with:     1.  Persistent right lumbar spine and right lower extremity proximal radicular pain in an L4 distribution.  She has severe right L4-5 foraminal stenosis related to degenerative disc disease and facet arthropathy in the setting of lumbar scoliosis.  Right lumbar radicular pain has been managed with a right L4-5 TFESI x 2 over the past year.  Failed medial branch blocks in 2023.  She also has had physical therapy in the past.  EMG has been negative for radiculopathy.  Failed gabapentin.  Taking Tylenol at bedtime.  Best benefit is a right L4-5 TFESI most recently done January 10, 2024.  Not a great surgical candidate per Dr. Vigil due to complexity of the surgery.  Tolerating pregabalin 25 mg twice daily slightly helpful with pain.        2.  Thoracolumbar pain likely multifactorial related to right-sided lumbar scoliosis, facet arthropathy, sacroiliac joint pain in setting of sagittal plane imbalance and flatback syndrome.   No improvement with physical therapy and gabapentin.  Pain remains consistent with right-sided facet arthropathy.  No specific radicular pain.  No improvement with medial branch blocks.    some improvement following right L4-5 TFESI done in August, December 2023 and January 10, 2024.      3.   Bilateral lower extremity paresthesias consistent with peripheral polyneuropathy.  No benefit with gabapentin.  This is persistent but tolerable.  No improvement with gabapentin.  Some improvement with Lyrica.     4.  Cramping in the lower extremities likely related to polyneuropathy she has normal ankle-brachial indexes.      Discussion:    1.  I discussed the diagnosis and treatment options.  We discussed her visit with Dr. Vigil and she is not wanting surgery.  We discussed that medial branch blocks were not helpful and the facet arthropathy at L4-5 likely is not significant contributor more related to the foraminal stenosis given her radicular pain. .We discussed options of further injections, changing Lyrica, spinal cord stimulator     2.  After discussion patient and I agreed that her goal is to maintain her current level of function.    she is applying well to Lyrica 25 mg twice daily may be slightly more drowsy/side effects.  We discussed increasing Lyrica to 25 mg 3 times daily and new prescription is provided and we will see how this goes.    3.  Can consider further right L4-5 TFESI or spinal cord stimulator in the future.    4.  Follow-up with me in 2 months.      It was our pleasure caring for your patient today, if there any questions or concerns please do not hesitate to contact us.    30 minutes were spent on the date of the encounter performing chart review, patient visit and documentation in addition to any procedure.    Subjective:   Patient ID: Mily Hilliard is a 73 year old female.    History of Present Illness: Patient presents with her  today provide some of the history.  She continues to have right-sided low back pain gluteal pain and occasional right knee pain with standing or sitting for prolonged periods better with walking and lying down.  Taking Lyrica 25 mg twice daily.  At night she has no pain overnight but in the afternoon she starts having pain again.  Maybe 20% improved  with Lyrica pain is 9/10 at worst 4/10 today and at best.  Takes Tylenol as well.  Since last visit was seen by Dr. Vigil.  She does have constant low back pain on the right however.  In review of record she did try medial branch blocks March of last year without benefit.    I reviewed the note from Dr. Vigil in neurosurgery from February.  I appreciate the thorough workup/evaluation.  She does have an option for a multilevel fusion which would be fairly extensive.  Require a year to fully recover a month in the hospital.  Patient is considering options.    Imaging: Full body EOS plain films were reviewed.  Scoliotic curve apex T10 and moderate to the right apex L2 positive sagittal imbalance negative coronal balance.    I reviewed the MRI lumbar spine showing severe facet arthropathy in the right L4-5 with along with severe right foraminal stenosis.  No other central stenosis.    Review of Systems: Pertinent positives: As paresthesias and weakness right leg.  Pertinent negatives:  .  No bowel or bladder incontinence.  No urinary retention.  No fevers, unintentional weight loss, balance changes, headaches, frequent falling, difficulty swallowing, or coordination difficulties.  All others reviewed are negative.    Past Medical History:   Diagnosis Date    Arthritis     Breast cancer (H) 2000    right breast- lumpectomy, lymph 15/19 pos - radiation and chemo    Hypercholesterolemia     Hypertension     Hypothyroidism        The following portions of the patient's history were reviewed and updated as appropriate: allergies, current medications, past family history, past medical history, past social history, past surgical history and problem list.           Objective:   Physical Exam:    /58   Pulse 55   There is no height or weight on file to calculate BMI.      General: Alert and oriented with normal affect. Attention, knowledge, memory, and language are intact. No acute distress.   Eyes: Sclerae are clear.   Respirations: Unlabored. CV: No lower extremity edema.       Sensation is intact to light touch throughout the   lower extremities.  Reflexes are   1+ patellar and 0 Achilles with downgoing toes.    Manual muscle testing reveals:  Right /Left out of 5     5/5 hip flexors  5/5 knee flexors  5/5 knee extensors  5/5 ankle plantar flexors  5/5 ankle dorsiflexors  5/5  EHL

## 2024-04-05 ENCOUNTER — MYC MEDICAL ADVICE (OUTPATIENT)
Dept: PEDIATRICS | Facility: CLINIC | Age: 74
End: 2024-04-05
Payer: COMMERCIAL

## 2024-04-05 DIAGNOSIS — Z12.11 COLON CANCER SCREENING: ICD-10-CM

## 2024-04-05 DIAGNOSIS — Z12.31 ENCOUNTER FOR SCREENING MAMMOGRAM FOR BREAST CANCER: Primary | ICD-10-CM

## 2024-04-05 NOTE — TELEPHONE ENCOUNTER
1. She can have yearly mammogram if she would like. She was not due until June  Order can be placed    2. Colonoscopy is not due until December therefore no order is in. I will put order for future for her though      Priscila Nichole PA-C on 4/5/2024 at 12:49 PM

## 2024-04-05 NOTE — TELEPHONE ENCOUNTER
Pt's MC message has been noted.    YANCI Whelan  Patient Advocate Liaison (PAL)  [facility]  Ph. [phone] / Fax. [phone]

## 2024-04-05 NOTE — TELEPHONE ENCOUNTER
Please see my chart question. Annual wellness done 02/13/2024.     Please advise if e-visit needed for routine orders requested, thank you.    YANCI West on 4/5/2024 at 7:56 AM

## 2024-05-13 ENCOUNTER — OFFICE VISIT (OUTPATIENT)
Dept: PHYSICAL MEDICINE AND REHAB | Facility: CLINIC | Age: 74
End: 2024-05-13
Payer: COMMERCIAL

## 2024-05-13 VITALS — DIASTOLIC BLOOD PRESSURE: 58 MMHG | HEART RATE: 55 BPM | SYSTOLIC BLOOD PRESSURE: 118 MMHG

## 2024-05-13 DIAGNOSIS — M47.816 LUMBAR FACET ARTHROPATHY: ICD-10-CM

## 2024-05-13 DIAGNOSIS — G62.9 POLYNEUROPATHY: ICD-10-CM

## 2024-05-13 DIAGNOSIS — M54.50 LUMBAR SPINE PAIN: Primary | ICD-10-CM

## 2024-05-13 DIAGNOSIS — M43.8X9 SAGITTAL PLANE IMBALANCE: ICD-10-CM

## 2024-05-13 DIAGNOSIS — M41.25 OTHER IDIOPATHIC SCOLIOSIS, THORACOLUMBAR REGION: ICD-10-CM

## 2024-05-13 DIAGNOSIS — M48.061 FORAMINAL STENOSIS OF LUMBAR REGION: ICD-10-CM

## 2024-05-13 DIAGNOSIS — M54.16 LUMBAR RADICULAR PAIN: ICD-10-CM

## 2024-05-13 PROCEDURE — 99214 OFFICE O/P EST MOD 30 MIN: CPT | Performed by: PHYSICAL MEDICINE & REHABILITATION

## 2024-05-13 RX ORDER — DULOXETIN HYDROCHLORIDE 20 MG/1
20 CAPSULE, DELAYED RELEASE ORAL DAILY
Qty: 30 CAPSULE | Refills: 1 | Status: SHIPPED | OUTPATIENT
Start: 2024-05-13

## 2024-05-13 ASSESSMENT — PAIN SCALES - GENERAL: PAINLEVEL: SEVERE PAIN (6)

## 2024-05-13 NOTE — PROGRESS NOTES
Assessment/Plan:      Mily was seen today for back pain.    Diagnoses and all orders for this visit:    Lumbar spine pain    Foraminal stenosis of lumbar region    Lumbar radicular pain  -     DULoxetine (CYMBALTA) 20 MG capsule; Take 1 capsule (20 mg) by mouth daily    Lumbar facet arthropathy    Sagittal plane imbalance    Other idiopathic scoliosis, thoracolumbar region    Polyneuropathy  -     DULoxetine (CYMBALTA) 20 MG capsule; Take 1 capsule (20 mg) by mouth daily         Assessment: Pleasant 73 year old female with a history of Hypothyroidism,with a history of hypertension, with a history of hypertension, hypothyroidism, breast cancer treated with chemotherapy and subsequent polyneuropathy, hyperlipidemia, osteoporosis with:     1.  Improved right lumbar spine and right lower extremity proximal radicular pain in an L4 distribution.  She has severe right L4-5 foraminal stenosis related to degenerative disc disease and facet arthropathy in the setting of lumbar scoliosis.  Right lumbar radicular pain has been managed with a right L4-5 TFESI x 2 over the past year.  Failed medial branch blocks in 2023.  She also has had physical therapy in the past.  EMG has been negative for radiculopathy.  Failed gabapentin.  Taking Tylenol at bedtime.  Best benefit is a right L4-5 TFESI most recently done January 10, 2024.  Not a great surgical candidate per Dr. Vigil due to complexity of the surgery.  Has had some improvement with Lyrica 25 mg 3 times daily.  But now taking ibuprofen 800 mg in the morning and 600 mg in the afternoon as well.  Having some intermittent right gluteal radicular pain still.     2.  Thoracolumbar pain likely multifactorial related to right-sided lumbar scoliosis, facet arthropathy, sacroiliac joint pain in setting of sagittal plane imbalance and flatback syndrome.   No improvement with physical therapy and gabapentin.  Pain remains consistent with right-sided facet arthropathy.  No specific  radicular pain.  No improvement with medial branch blocks.    some improvement following right L4-5 TFESI done in August, December 2023 and January 10, 2024.  Some improvement with Lyrica.     3.  Bilateral lower extremity paresthesias consistent with peripheral polyneuropathy.  No benefit with gabapentin.  This is persistent but tolerable.  No improvement with gabapentin.  May be improved with Lyrica slightly.    4.  Slight decline in BUN and creatinine    5.  Cramping in the lower extremities likely related to polyneuropathy she has normal ankle-brachial indexes.         Discussion:    1.  We discussed the diagnosis and treatment options.  We discussed options of medication changes.  We discussed her kidney functions and NSAIDs.  We discussed options of a trial of meloxicam 7.5 mg daily in place of ibuprofen but given her renal function may want to come up with some other option we discussed options of Cymbalta as well.  She is still not wanting surgical intervention.  At this time she wants to continue to hold off on further injections if possible as she is only having intermittent radicular pain.  Continues to do home exercises.    2.  Recommend she continue with home exercises.    3.  Would like to try to get her weaned off of NSAIDs given slight drop in renal function on most recent labs.  Would recommend Tylenol 1000 mg 3 times daily as needed.    4.  Trial duloxetine 20 mg daily at bedtime.    5.  Continue Lyrica 25 mg 3 times daily.    6.  Follow-up with me in 6 weeks.  Call with questions.         It was our pleasure caring for your patient today, if there any questions or concerns please do not hesitate to contact us.      Subjective:   Patient ID: Mily Hilliard is a 73 year old female.    History of Present Illness: Patient presents for follow-up of lumbar spine pain as well as right lower  extremity radicular pain thoracic or lumbar pain.  She continues to have significant right gluteal pain and  intermittent shooting to the right lateral gluteal region proximal leg.  Most of her pain is to the mid to lower lumbar spine on the right as well as the sacrum.  Also some pain in the thoracic spine.  Worse with prolonged standing or sitting better with walking lying down.  Tries to do exercises at the gym and moves.  She has had some slight improvement in her pain overall taking Lyrica 25 mg 3 times daily no side effects.  She is also added ibuprofen 800 mg in the morning 600 mg in the afternoon and Tylenol 1000 mg at bedtime.  Pain is a 10/10 at worst 6/10 today 3/10 at best.She continues to have numbness and tingling in the feet to the knees as well.    Labs: BUN recent CMP from February 2024: BUN 19.4 which is increased from 12.0 February 2023 and creatinine 0.78 which is slightly up from 0.64.  Calcium remains normal at 9.3 AST 28 and ALT 23 are stable.    Imaging: I reviewed the MRI of the lumbar spine again today.  She has lumbar kyphotic curve at L3-1.  Retrolisthesis L3 and L4 with severe degenerative disc disease broad-based disc bulges.  Severe right L4-5 foraminal stenosis with severe facet arthropathy.  Right lateral recess stenosis L3-4 as well.    Review of Systems: Pertinent positives: Bilateral foot paresthesias.  Pertinent negatives: No  weakness.  No bowel or bladder incontinence.  No urinary retention.  No fevers, unintentional weight loss, balance changes, headaches, frequent falling, difficulty swallowing, or coordination difficulties.  All others reviewed are negative.        Prior interventions:  1.  Right L4-5 TFESI January 2024, August 2023, April 2023.    2.  Medial branch blocks right L3, 4, 5 March 2023.  No benefit.    Past Medical History:   Diagnosis Date    Arthritis     Breast cancer (H) 2000    right breast- lumpectomy, lymph 15/19 pos - radiation and chemo    Hypercholesterolemia     Hypertension     Hypothyroidism        The following portions of the patient's history were  reviewed and updated as appropriate: allergies, current medications, past family history, past medical history, past social history, past surgical history and problem list.           Objective:   Physical Exam:    /58   Pulse 55   There is no height or weight on file to calculate BMI.      General: Alert and oriented with normal affect. Attention, knowledge, memory, and language are intact. No acute distress.   Eyes: Sclerae are clear.  Respirations: Unlabored. CV: Trace bilateral lower extremity edema skin: No rashes seen.    Gait:  Nonantalgic, sagittal plane imbalance.  Tenderness to palpation right lumbar paraspinals L3-4 L4-5 L5-S1 as well as right sacrum and sacral sulcus.  Prominent right transverse process lower lumbar spine with right scoliosis.  Sensation is decreased to light touch bilateral lower extremities up to the knees.  Reflexes are  .  1+ patellar and 0 Achilles      Manual muscle testing reveals:  Right /Left out of 5  5/5 shoulder abductors  5/5 elbow flexors  5/5 elbow extensors  5/5 wrist extensors  5/5 interosseus  5/5 finger flexors  5/5 hip flexors  5/5 knee flexors  5/5 knee extensors  5/5 ankle plantar flexors  5/5 ankle dorsiflexors  5/5  EHL and ankle evertors

## 2024-05-13 NOTE — PATIENT INSTRUCTIONS
Trial Cymbalta (duloxetine) 20mg at bedtime  Continue Lyrica 25 mg three times daily  Stop ibuprofen  Take Tylenol 1000mg three times daily as needed    Please call our nurses if you have any questions: Clinic Phone # [phone]

## 2024-05-13 NOTE — LETTER
5/13/2024         RE: Mily Hilliard  [address]  [place] [address]        Dear Colleague,    Thank you for referring your patient, Mily Hilliard, to the [facility]. Please see a copy of my visit note below.    Assessment/Plan:      Mily was seen today for back pain.    Diagnoses and all orders for this visit:    Lumbar spine pain    Foraminal stenosis of lumbar region    Lumbar radicular pain  -     DULoxetine (CYMBALTA) 20 MG capsule; Take 1 capsule (20 mg) by mouth daily    Lumbar facet arthropathy    Sagittal plane imbalance    Other idiopathic scoliosis, thoracolumbar region    Polyneuropathy  -     DULoxetine (CYMBALTA) 20 MG capsule; Take 1 capsule (20 mg) by mouth daily         Assessment: Pleasant 73 year old female with a history of Hypothyroidism,with a history of hypertension, with a history of hypertension, hypothyroidism, breast cancer treated with chemotherapy and subsequent polyneuropathy, hyperlipidemia, osteoporosis with:     1.  Improved right lumbar spine and right lower extremity proximal radicular pain in an L4 distribution.  She has severe right L4-5 foraminal stenosis related to degenerative disc disease and facet arthropathy in the setting of lumbar scoliosis.  Right lumbar radicular pain has been managed with a right L4-5 TFESI x 2 over the past year.  Failed medial branch blocks in 2023.  She also has had physical therapy in the past.  EMG has been negative for radiculopathy.  Failed gabapentin.  Taking Tylenol at bedtime.  Best benefit is a right L4-5 TFESI most recently done January 10, 2024.  Not a great surgical candidate per Dr. Vigil due to complexity of the surgery.  Has had some improvement with Lyrica 25 mg 3 times daily.  But now taking ibuprofen 800 mg in the morning and 600 mg in the afternoon as well.  Having some intermittent right gluteal radicular pain still.     2.  Thoracolumbar pain likely multifactorial related to  right-sided lumbar scoliosis, facet arthropathy, sacroiliac joint pain in setting of sagittal plane imbalance and flatback syndrome.   No improvement with physical therapy and gabapentin.  Pain remains consistent with right-sided facet arthropathy.  No specific radicular pain.  No improvement with medial branch blocks.    some improvement following right L4-5 TFESI done in August, December 2023 and January 10, 2024.  Some improvement with Lyrica.     3.  Bilateral lower extremity paresthesias consistent with peripheral polyneuropathy.  No benefit with gabapentin.  This is persistent but tolerable.  No improvement with gabapentin.  May be improved with Lyrica slightly.    4.  Slight decline in BUN and creatinine    5.  Cramping in the lower extremities likely related to polyneuropathy she has normal ankle-brachial indexes.         Discussion:    1.  We discussed the diagnosis and treatment options.  We discussed options of medication changes.  We discussed her kidney functions and NSAIDs.  We discussed options of a trial of meloxicam 7.5 mg daily in place of ibuprofen but given her renal function may want to come up with some other option we discussed options of Cymbalta as well.  She is still not wanting surgical intervention.  At this time she wants to continue to hold off on further injections if possible as she is only having intermittent radicular pain.  Continues to do home exercises.    2.  Recommend she continue with home exercises.    3.  Would like to try to get her weaned off of NSAIDs given slight drop in renal function on most recent labs.  Would recommend Tylenol 1000 mg 3 times daily as needed.    4.  Trial duloxetine 20 mg daily at bedtime.    5.  Continue Lyrica 25 mg 3 times daily.    6.  Follow-up with me in 6 weeks.  Call with questions.         It was our pleasure caring for your patient today, if there any questions or concerns please do not hesitate to contact us.      Subjective:   Patient ID:  Mily Hilliard is a 73 year old female.    History of Present Illness: Patient presents for follow-up of lumbar spine pain as well as right lower  extremity radicular pain thoracic or lumbar pain.  She continues to have significant right gluteal pain and intermittent shooting to the right lateral gluteal region proximal leg.  Most of her pain is to the mid to lower lumbar spine on the right as well as the sacrum.  Also some pain in the thoracic spine.  Worse with prolonged standing or sitting better with walking lying down.  Tries to do exercises at the gym and moves.  She has had some slight improvement in her pain overall taking Lyrica 25 mg 3 times daily no side effects.  She is also added ibuprofen 800 mg in the morning 600 mg in the afternoon and Tylenol 1000 mg at bedtime.  Pain is a 10/10 at worst 6/10 today 3/10 at best.She continues to have numbness and tingling in the feet to the knees as well.    Labs: BUN recent CMP from February 2024: BUN 19.4 which is increased from 12.0 February 2023 and creatinine 0.78 which is slightly up from 0.64.  Calcium remains normal at 9.3 AST 28 and ALT 23 are stable.    Imaging: I reviewed the MRI of the lumbar spine again today.  She has lumbar kyphotic curve at L3-1.  Retrolisthesis L3 and L4 with severe degenerative disc disease broad-based disc bulges.  Severe right L4-5 foraminal stenosis with severe facet arthropathy.  Right lateral recess stenosis L3-4 as well.    Review of Systems: Pertinent positives: Bilateral foot paresthesias.  Pertinent negatives: No  weakness.  No bowel or bladder incontinence.  No urinary retention.  No fevers, unintentional weight loss, balance changes, headaches, frequent falling, difficulty swallowing, or coordination difficulties.  All others reviewed are negative.        Prior interventions:  1.  Right L4-5 TFESI January 2024, August 2023, April 2023.    2.  Medial branch blocks right L3, 4, 5 March 2023.  No benefit.    Past Medical  History:   Diagnosis Date     Arthritis      Breast cancer (H) 2000    right breast- lumpectomy, lymph 15/19 pos - radiation and chemo     Hypercholesterolemia      Hypertension      Hypothyroidism        The following portions of the patient's history were reviewed and updated as appropriate: allergies, current medications, past family history, past medical history, past social history, past surgical history and problem list.           Objective:   Physical Exam:    /58   Pulse 55   There is no height or weight on file to calculate BMI.      General: Alert and oriented with normal affect. Attention, knowledge, memory, and language are intact. No acute distress.   Eyes: Sclerae are clear.  Respirations: Unlabored. CV: Trace bilateral lower extremity edema skin: No rashes seen.    Gait:  Nonantalgic, sagittal plane imbalance.  Tenderness to palpation right lumbar paraspinals L3-4 L4-5 L5-S1 as well as right sacrum and sacral sulcus.  Prominent right transverse process lower lumbar spine with right scoliosis.  Sensation is decreased to light touch bilateral lower extremities up to the knees.  Reflexes are  .  1+ patellar and 0 Achilles      Manual muscle testing reveals:  Right /Left out of 5  5/5 shoulder abductors  5/5 elbow flexors  5/5 elbow extensors  5/5 wrist extensors  5/5 interosseus  5/5 finger flexors  5/5 hip flexors  5/5 knee flexors  5/5 knee extensors  5/5 ankle plantar flexors  5/5 ankle dorsiflexors  5/5  EHL and ankle evertors      Again, thank you for allowing me to participate in the care of your patient.        Sincerely,        Serafin Mccain DO

## 2024-06-24 ENCOUNTER — ANCILLARY PROCEDURE (OUTPATIENT)
Dept: MAMMOGRAPHY | Facility: CLINIC | Age: 74
End: 2024-06-24
Attending: PHYSICIAN ASSISTANT
Payer: COMMERCIAL

## 2024-06-24 DIAGNOSIS — Z12.31 ENCOUNTER FOR SCREENING MAMMOGRAM FOR BREAST CANCER: ICD-10-CM

## 2024-06-24 PROCEDURE — 77067 SCR MAMMO BI INCL CAD: CPT | Mod: TC | Performed by: RADIOLOGY

## 2024-06-24 PROCEDURE — 77063 BREAST TOMOSYNTHESIS BI: CPT | Mod: TC | Performed by: RADIOLOGY

## 2024-06-25 ENCOUNTER — OFFICE VISIT (OUTPATIENT)
Dept: PHYSICAL MEDICINE AND REHAB | Facility: CLINIC | Age: 74
End: 2024-06-25
Payer: COMMERCIAL

## 2024-06-25 VITALS — HEART RATE: 56 BPM | DIASTOLIC BLOOD PRESSURE: 62 MMHG | SYSTOLIC BLOOD PRESSURE: 138 MMHG

## 2024-06-25 DIAGNOSIS — M54.16 LUMBAR RADICULAR PAIN: ICD-10-CM

## 2024-06-25 DIAGNOSIS — M48.061 FORAMINAL STENOSIS OF LUMBAR REGION: Primary | ICD-10-CM

## 2024-06-25 DIAGNOSIS — G62.9 POLYNEUROPATHY: ICD-10-CM

## 2024-06-25 DIAGNOSIS — M79.18 MYOFASCIAL PAIN: ICD-10-CM

## 2024-06-25 PROCEDURE — 99214 OFFICE O/P EST MOD 30 MIN: CPT | Performed by: PHYSICAL MEDICINE & REHABILITATION

## 2024-06-25 RX ORDER — PREGABALIN 25 MG/1
25 CAPSULE ORAL 3 TIMES DAILY
Qty: 90 CAPSULE | Refills: 2 | Status: SHIPPED | OUTPATIENT
Start: 2024-06-25 | End: 2024-09-26

## 2024-06-25 RX ORDER — DULOXETIN HYDROCHLORIDE 30 MG/1
30 CAPSULE, DELAYED RELEASE ORAL DAILY
Qty: 30 CAPSULE | Refills: 1 | Status: SHIPPED | OUTPATIENT
Start: 2024-06-25 | End: 2024-08-22

## 2024-06-25 ASSESSMENT — PAIN SCALES - GENERAL: PAINLEVEL: MODERATE PAIN (4)

## 2024-06-25 NOTE — PATIENT INSTRUCTIONS
Increase Duloxetine to 30 mg at bedtime  Continue with Lyrica 25 mg three times daily  PT order to try traction. A physical therapy order was provided for you today.  You will be contacted by physical therapy.  If nobody contacts you within 3 to 5 days, please contact the clinic at [phone].  It will be very important for you to do your physical therapy exercises on a regular basis to decrease your pain and prevent future pain flares.

## 2024-06-25 NOTE — PROGRESS NOTES
Assessment/Plan:      Mily was seen today for follow up.    Diagnoses and all orders for this visit:    Foraminal stenosis of lumbar region  -     Physical Therapy  Referral; Future    Lumbar radicular pain  -     pregabalin (LYRICA) 25 MG capsule; Take 1 capsule (25 mg) by mouth 3 times daily  -     DULoxetine (CYMBALTA) 30 MG capsule; Take 1 capsule (30 mg) by mouth daily  -     Physical Therapy  Referral; Future    Myofascial pain  -     Physical Therapy  Referral; Future    Polyneuropathy  -     pregabalin (LYRICA) 25 MG capsule; Take 1 capsule (25 mg) by mouth 3 times daily  -     DULoxetine (CYMBALTA) 30 MG capsule; Take 1 capsule (30 mg) by mouth daily         Assessment: Pleasant 74 year old female with a history of Hypothyroidism,with a history of hypertension, with a history of hypertension, hypothyroidism, breast cancer treated with chemotherapy and subsequent polyneuropathy, hyperlipidemia, osteoporosis with:     1.  Persistent right lumbar spine and right lower extremity proximal radicular pain in an L4 distribution.  She has severe right L4-5 foraminal stenosis related to degenerative disc disease and facet arthropathy in the setting of lumbar scoliosis.  Right lumbar radicular pain has been managed with a right L4-5 TFESI x 2 over the past year.  Failed medial branch blocks in 2023.  She also has had physical therapy in the past.  EMG has been negative for radiculopathy.  Failed gabapentin.  Taking Tylenol at bedtime.  Best benefit is a right L4-5 TFESI most recently done January 10, 2024.  Not a great surgical candidate per Dr. Vigil due to complexity of the surgery.  Has had some improvement with pregabalin 25 mg 3 times daily tolerating this well.  Duloxetine 20 mg daily tolerated well unsure if it is helpful.  Has had some improvement with walking and being more active.     2.  Thoracolumbar pain likely multifactorial related to right-sided lumbar scoliosis, facet  arthropathy, sacroiliac joint pain in setting of sagittal plane imbalance and flatback syndrome.   No improvement with physical therapy and gabapentin.  Pain remains consistent with right-sided facet arthropathy.  No specific radicular pain.  No improvement with medial branch blocks.    some improvement following right L4-5 TFESI done in August, December 2023 and January 10, 2024.  Some improvement with Lyrica patient improvement duloxetine.     3.  Bilateral lower extremity paresthesias consistent with peripheral polyneuropathy.  No benefit with gabapentin.  This is persistent but tolerable.  No improvement with gabapentin.  May be slightly improved with Lyrica or at baseline.  No change with duloxetine.            Discussion:    1.  We had a discussion today regarding potential changes in medications, holding the course, therapy for traction trial and neurosurgical evaluation for foraminotomies she does not want a large fusion.  At this point she is doing fairly well overall and does not want to trial neurosurgical evaluation but is open to other options.    2.   slightly increase duloxetine to 30 mg daily new prescription provided.    3.  Continue Lyrica 25 mg 3 times daily no refill provided.  This can be considered to increase in the future.    4.  Physical therapy referral to trial lumbar traction.    5.  Follow-up with me in a couple months.    It was our pleasure caring for your patient today, if there any questions or concerns please do not hesitate to contact us.       Subjective:   Patient ID: Mily Hilliard is a 74 year old female.    History of Present Illness: Patient presents with her  today for follow-up pain right gluteal pain.  Continues to thoracolumbar pain lumbar pain right gluteal pain worse with standing or prolonged sitting.  Better with walking moving sleeping and with medications.  Overall doing fairly well tolerating duloxetine 20 mg at bedtime unsure if it is helpful for the pain  but her  feels like it is helped her pain and her mood in general.  Pregabalin still taking 25 mg 3 times daily no significant side effects.  Polyneuropathy symptoms remain of paresthesias in the feet unchanged.  Pain is a 4/10 today in the low back and right gluteal region also taking Tylenol 3000 mg daily.      Imaging: MRI lumbar spine from February reviewed again today for medical decision-making purposes.  Disc height loss throughout the lumbar spine right lumbar scoliotic curve centered at L3-4 no high-grade central stenosis.  Retrolisthesis 3 and L4 L4 on L5 with severe right foraminal stenosis L4-5.  Facet arthropathy moderate right greater than left at L5-S1.    Review of Systems: Pertinent positives: Paresthesias in the feet.  Pertinent negatives: No  weakness.  No bowel or bladder incontinence.  No urinary retention.  No fevers, unintentional weight loss, balance changes, headaches, frequent falling, difficulty swallowing, or coordination difficulties.  All others reviewed are negative.    Past Medical History:   Diagnosis Date    Arthritis     Breast cancer (H) 2000    right breast- lumpectomy, lymph 15/19 pos - radiation and chemo    Hypercholesterolemia     Hypertension     Hypothyroidism        The following portions of the patient's history were reviewed and updated as appropriate: allergies, current medications, past family history, past medical history, past social history, past surgical history and problem list.           Objective:   Physical Exam:    /62   Pulse 56   There is no height or weight on file to calculate BMI.      General: Alert and oriented with normal affect. Attention, knowledge, memory, and language are intact. No acute distress.   Eyes: Sclerae are clear.  Respirations: Unlabored. CV: No lower extremity edema.  Skin: No rashes seen.         Sensation is intact to light touch throughout the   lower extremities.  Reflexes are 1+ patellar and 0 Achilles      Manual  muscle testing reveals:  Right /Left out of 5     5/5 hip flexors  5/5 knee flexors  5/5 knee extensors  5/5 ankle plantar flexors  5/5 ankle dorsiflexors  5/5    ankle evertors

## 2024-06-25 NOTE — LETTER
6/25/2024      Mily Hilliard  [address]  [place] [address]      Dear Colleague,    Thank you for referring your patient, Mily Hilliard, to the [facility]. Please see a copy of my visit note below.    Assessment/Plan:      Mily was seen today for follow up.    Diagnoses and all orders for this visit:    Foraminal stenosis of lumbar region  -     Physical Therapy  Referral; Future    Lumbar radicular pain  -     pregabalin (LYRICA) 25 MG capsule; Take 1 capsule (25 mg) by mouth 3 times daily  -     DULoxetine (CYMBALTA) 30 MG capsule; Take 1 capsule (30 mg) by mouth daily  -     Physical Therapy  Referral; Future    Myofascial pain  -     Physical Therapy  Referral; Future    Polyneuropathy  -     pregabalin (LYRICA) 25 MG capsule; Take 1 capsule (25 mg) by mouth 3 times daily  -     DULoxetine (CYMBALTA) 30 MG capsule; Take 1 capsule (30 mg) by mouth daily         Assessment: Pleasant 74 year old female with a history of Hypothyroidism,with a history of hypertension, with a history of hypertension, hypothyroidism, breast cancer treated with chemotherapy and subsequent polyneuropathy, hyperlipidemia, osteoporosis with:     1.  Persistent right lumbar spine and right lower extremity proximal radicular pain in an L4 distribution.  She has severe right L4-5 foraminal stenosis related to degenerative disc disease and facet arthropathy in the setting of lumbar scoliosis.  Right lumbar radicular pain has been managed with a right L4-5 TFESI x 2 over the past year.  Failed medial branch blocks in 2023.  She also has had physical therapy in the past.  EMG has been negative for radiculopathy.  Failed gabapentin.  Taking Tylenol at bedtime.  Best benefit is a right L4-5 TFESI most recently done January 10, 2024.  Not a great surgical candidate per Dr. Vigil due to complexity of the surgery.  Has had some improvement with pregabalin 25 mg 3 times daily  tolerating this well.  Duloxetine 20 mg daily tolerated well unsure if it is helpful.  Has had some improvement with walking and being more active.     2.  Thoracolumbar pain likely multifactorial related to right-sided lumbar scoliosis, facet arthropathy, sacroiliac joint pain in setting of sagittal plane imbalance and flatback syndrome.   No improvement with physical therapy and gabapentin.  Pain remains consistent with right-sided facet arthropathy.  No specific radicular pain.  No improvement with medial branch blocks.    some improvement following right L4-5 TFESI done in August, December 2023 and January 10, 2024.  Some improvement with Lyrica patient improvement duloxetine.     3.  Bilateral lower extremity paresthesias consistent with peripheral polyneuropathy.  No benefit with gabapentin.  This is persistent but tolerable.  No improvement with gabapentin.  May be slightly improved with Lyrica or at baseline.  No change with duloxetine.            Discussion:    1.  We had a discussion today regarding potential changes in medications, holding the course, therapy for traction trial and neurosurgical evaluation for foraminotomies she does not want a large fusion.  At this point she is doing fairly well overall and does not want to trial neurosurgical evaluation but is open to other options.    2.   slightly increase duloxetine to 30 mg daily new prescription provided.    3.  Continue Lyrica 25 mg 3 times daily no refill provided.  This can be considered to increase in the future.    4.  Physical therapy referral to trial lumbar traction.    5.  Follow-up with me in a couple months.    It was our pleasure caring for your patient today, if there any questions or concerns please do not hesitate to contact us.       Subjective:   Patient ID: Mily Hilliard is a 74 year old female.    History of Present Illness: Patient presents with her  today for follow-up pain right gluteal pain.  Continues to  thoracolumbar pain lumbar pain right gluteal pain worse with standing or prolonged sitting.  Better with walking moving sleeping and with medications.  Overall doing fairly well tolerating duloxetine 20 mg at bedtime unsure if it is helpful for the pain but her  feels like it is helped her pain and her mood in general.  Pregabalin still taking 25 mg 3 times daily no significant side effects.  Polyneuropathy symptoms remain of paresthesias in the feet unchanged.  Pain is a 4/10 today in the low back and right gluteal region also taking Tylenol 3000 mg daily.      Imaging: MRI lumbar spine from February reviewed again today for medical decision-making purposes.  Disc height loss throughout the lumbar spine right lumbar scoliotic curve centered at L3-4 no high-grade central stenosis.  Retrolisthesis 3 and L4 L4 on L5 with severe right foraminal stenosis L4-5.  Facet arthropathy moderate right greater than left at L5-S1.    Review of Systems: Pertinent positives: Paresthesias in the feet.  Pertinent negatives: No  weakness.  No bowel or bladder incontinence.  No urinary retention.  No fevers, unintentional weight loss, balance changes, headaches, frequent falling, difficulty swallowing, or coordination difficulties.  All others reviewed are negative.    Past Medical History:   Diagnosis Date     Arthritis      Breast cancer (H) 2000    right breast- lumpectomy, lymph 15/19 pos - radiation and chemo     Hypercholesterolemia      Hypertension      Hypothyroidism        The following portions of the patient's history were reviewed and updated as appropriate: allergies, current medications, past family history, past medical history, past social history, past surgical history and problem list.           Objective:   Physical Exam:    /62   Pulse 56   There is no height or weight on file to calculate BMI.      General: Alert and oriented with normal affect. Attention, knowledge, memory, and language are intact.  No acute distress.   Eyes: Sclerae are clear.  Respirations: Unlabored. CV: No lower extremity edema.  Skin: No rashes seen.         Sensation is intact to light touch throughout the   lower extremities.  Reflexes are 1+ patellar and 0 Achilles      Manual muscle testing reveals:  Right /Left out of 5     5/5 hip flexors  5/5 knee flexors  5/5 knee extensors  5/5 ankle plantar flexors  5/5 ankle dorsiflexors  5/5    ankle evertors      Again, thank you for allowing me to participate in the care of your patient.        Sincerely,        Serafin Mccain DO

## 2024-07-10 ENCOUNTER — DOCUMENTATION ONLY (OUTPATIENT)
Dept: OTHER | Facility: CLINIC | Age: 74
End: 2024-07-10
Payer: COMMERCIAL

## 2024-07-20 DIAGNOSIS — S32.010A COMPRESSION FRACTURE OF L1 VERTEBRA, INITIAL ENCOUNTER (H): ICD-10-CM

## 2024-07-22 RX ORDER — ALENDRONATE SODIUM 70 MG/1
TABLET ORAL
Qty: 12 TABLET | Refills: 0 | Status: SHIPPED | OUTPATIENT
Start: 2024-07-22

## 2024-07-29 ENCOUNTER — THERAPY VISIT (OUTPATIENT)
Dept: PHYSICAL THERAPY | Facility: CLINIC | Age: 74
End: 2024-07-29
Attending: PHYSICAL MEDICINE & REHABILITATION
Payer: COMMERCIAL

## 2024-07-29 DIAGNOSIS — M79.18 MYOFASCIAL PAIN: ICD-10-CM

## 2024-07-29 DIAGNOSIS — M54.16 LUMBAR RADICULAR PAIN: ICD-10-CM

## 2024-07-29 DIAGNOSIS — M48.061 FORAMINAL STENOSIS OF LUMBAR REGION: ICD-10-CM

## 2024-07-29 PROCEDURE — 97110 THERAPEUTIC EXERCISES: CPT | Mod: GP | Performed by: PHYSICAL THERAPIST

## 2024-07-29 PROCEDURE — 97161 PT EVAL LOW COMPLEX 20 MIN: CPT | Mod: GP | Performed by: PHYSICAL THERAPIST

## 2024-07-29 NOTE — PROGRESS NOTES
PHYSICAL THERAPY EVALUATION  Type of Visit: Evaluation              Subjective       Presenting condition or subjective complaint:  Patient has had progressive low back pain for years.  She has been seen by a surgeon who specifically treats scoliosis patients and she regularly sees an MD at the spine center.  They suggested a trial of lumbar traction. She is taking Lyrica for her low back and duloxatine at night. Better with: medication, walking, heat, lumbar support.    Worse with: sitting (longer than: 30 min +), standing: (30 min+).   Current Pain: 6/10.  Best: 4/10.  Worst in the last 24 hours: 8/10. Patient has neuropathy in feet/hands, and lower legs.  History of breast cancer and she had several chemotherapy drugs.  She is in longer term remission. Her pain is located: center to Right low back into the posterior hip. History of compression fracture at L1. She has a 39% kyphosis. She has been diagnosed with Osteoporosis. Due for a DEXA Scan. Patient would like to get back to cooking for longer periods of time.     Date of onset: 06/25/24 (Chronic condition MD order date listed.)    Relevant medical history:     Dates & types of surgery:      Prior diagnostic imaging/testing results: MRI; CT scan; X-ray; EMG; Bone scan     Prior therapy history for the same diagnosis, illness or injury: No      Prior Level of Function  Transfers: Independent  Ambulation: Independent  ADL: Independent   does the housework and he makes the meals.  She has a shower chair.     Living Environment  Social support: With a significant other or spouse   Type of home: House   Stairs to enter the home: Yes 10 Is there a railing: Yes     Ramp: No   Stairs inside the home: Yes 10 Is there a railing: Yes     Help at home: None  Equipment owned:       Employment: Not Applicable    Hobbies/Interests:      Patient goals for therapy: Cooking, cleaning and traveling distances by car     Objective   Lumbar Objective:  Observation: Gait:  Shuffling gait pattern with decreased clearance of B LE's through swing phase of gait, increased thoracic kyphosis, decreased hip extension, and forward head/rounded protracted shoulders.  Lumbar AROM:  Motion    Flexion Performed with hands on table for support.  50% of full with reports of relief of symptoms   Extension 25% of full with reports of relief of symptoms.     Right Left    Side Bending 25% with slight increased pain in R lumbar spine and Glute.  50% no pain   Rotation 50% no pain 50% no pain   -No Lumbar Shift Present.  Reflexes: normal and symmetrical throughout BLE's.  Clonus: Absent through BLE's.  Sensation: Patient reports neuropathy in hands, feet, extending into the lower legs.     Myotomes:   Nerve Root Lower Limb Movement Right Left   L2 Hip Flexion 4/5 4/5   L3 Knee Extension 5/5 5/5   S2 Knee Flexion 5/5 5/5   L4 Ankle Dorsiflexion 5/5 5/5   L5 Great Toe Extension 5/5 4-/5         Hip PROM: pain with PROM hip flexion on R in R lumbar spine and hip.  Limited hip IR B Left Greater than Right. Limited hip ER on L.   Palpation: increased tightness and tenderness to Right Glute max/med.   PA's/Joint Assessment: deferred due to presence of osteoporosis   Trial of Manual BLE long axis distraction. Reports of relief with this.       Assessment & Plan   CLINICAL IMPRESSIONS  Medical Diagnosis: Foraminal stenosis of lumbar region (M48.061)    Lumbar radicular pain (M54.16)    Myofascial pain (M79.18)    Treatment Diagnosis: Low Back Pain, Foraminal stenosis of lumbar region (M48.061)    Lumbar radicular pain (M54.16)    Myofascial pain (M79.18)   Impression/Assessment: Patient is a 74 year old female with Low Back  complaints.  The following significant findings have been identified: Pain, Decreased ROM/flexibility, Decreased joint mobility, Decreased strength, Impaired muscle performance, Decreased activity tolerance, and Impaired posture. These impairments interfere with their ability to perform  self care tasks as compared to previous level of function.     Clinical Decision Making (Complexity):  Clinical Presentation: Stable/Uncomplicated  Clinical Presentation Rationale: based on medical and personal factors listed in PT evaluation  Clinical Decision Making (Complexity): Low complexity    PLAN OF CARE  Treatment Interventions:  Modalities: Cryotherapy, Dry Needling, E-stim, Hot Pack, Mechanical Traction  Interventions: Gait Training, Manual Therapy, Neuromuscular Re-education, Therapeutic Activity, Therapeutic Exercise, Self-Care/Home Management    Long Term Goals     PT Goal 1  Goal Identifier: Sitting  Goal Description: Patient will sit for 45 min without pain  Rationale: to maximize safety and independence with performance of ADLs and functional tasks;to maximize safety and independence within the community;to maximize safety and independence with transportation;to maximize safety and independence with self cares (To rest from standing)  Target Date: 09/23/24      Frequency of Treatment: 1 time per week  Duration of Treatment: 8 weeks    Education Assessment:   Learner/Method: Patient;Listening;Demonstration;Reading;Pictures/Video;No Barriers to Learning  Education Comments: Educated on findings of exam and likely frequency/duration of PT POC.    Risks and benefits of evaluation/treatment have been explained.   Patient/Family/caregiver agrees with Plan of Care.     Evaluation Time:     PT Eval, Low Complexity Minutes (40841): 25   Signing Clinician: Cheryl Beltre, PT        [facility]                                                                                   OUTPATIENT PHYSICAL THERAPY      PLAN OF TREATMENT FOR OUTPATIENT REHABILITATION   Patient's Last Name, First Name, Mily Davis YOB: 1950   Provider's Name   [facility]   Medical Record No.  [id number]     Onset Date: 06/25/24 (Chronic condition MD  order date listed.)  Start of Care Date: 07/29/24     Medical Diagnosis:  Foraminal stenosis of lumbar region (M48.061)    Lumbar radicular pain (M54.16)    Myofascial pain (M79.18)      PT Treatment Diagnosis:  Low Back Pain, Foraminal stenosis of lumbar region (M48.061)    Lumbar radicular pain (M54.16)    Myofascial pain (M79.18) Plan of Treatment  Frequency/Duration: 1 time per week/ 8 weeks    Certification date from 07/29/24 to 09/23/24         See note for plan of treatment details and functional goals     Cheryl Beltre, PT                         I CERTIFY THE NEED FOR THESE SERVICES FURNISHED UNDER        THIS PLAN OF TREATMENT AND WHILE UNDER MY CARE     (Physician attestation of this document indicates review and certification of the therapy plan).              Referring Provider:  Serafin Mccain,     Initial Assessment  See Epic Evaluation- Start of Care Date: 07/29/24

## 2024-07-30 PROBLEM — M48.061 FORAMINAL STENOSIS OF LUMBAR REGION: Status: ACTIVE | Noted: 2024-07-30

## 2024-07-30 PROBLEM — M54.16 LUMBAR RADICULAR PAIN: Status: ACTIVE | Noted: 2024-07-30

## 2024-07-30 PROBLEM — M79.18 MYOFASCIAL PAIN: Status: ACTIVE | Noted: 2024-07-30

## 2024-08-05 ENCOUNTER — THERAPY VISIT (OUTPATIENT)
Dept: PHYSICAL THERAPY | Facility: CLINIC | Age: 74
End: 2024-08-05
Attending: PHYSICAL MEDICINE & REHABILITATION
Payer: COMMERCIAL

## 2024-08-05 DIAGNOSIS — M48.061 FORAMINAL STENOSIS OF LUMBAR REGION: Primary | ICD-10-CM

## 2024-08-05 DIAGNOSIS — M79.18 MYOFASCIAL PAIN: ICD-10-CM

## 2024-08-05 DIAGNOSIS — M54.16 LUMBAR RADICULAR PAIN: ICD-10-CM

## 2024-08-05 PROCEDURE — 97110 THERAPEUTIC EXERCISES: CPT | Mod: GP | Performed by: PHYSICAL THERAPIST

## 2024-08-05 PROCEDURE — 97140 MANUAL THERAPY 1/> REGIONS: CPT | Mod: GP | Performed by: PHYSICAL THERAPIST

## 2024-08-12 ENCOUNTER — THERAPY VISIT (OUTPATIENT)
Dept: PHYSICAL THERAPY | Facility: CLINIC | Age: 74
End: 2024-08-12
Attending: PHYSICAL MEDICINE & REHABILITATION
Payer: COMMERCIAL

## 2024-08-12 DIAGNOSIS — M79.18 MYOFASCIAL PAIN: ICD-10-CM

## 2024-08-12 DIAGNOSIS — M48.061 FORAMINAL STENOSIS OF LUMBAR REGION: Primary | ICD-10-CM

## 2024-08-12 DIAGNOSIS — M54.16 LUMBAR RADICULAR PAIN: ICD-10-CM

## 2024-08-12 PROCEDURE — 97140 MANUAL THERAPY 1/> REGIONS: CPT | Mod: GP | Performed by: PHYSICAL THERAPIST

## 2024-08-12 PROCEDURE — 97110 THERAPEUTIC EXERCISES: CPT | Mod: GP | Performed by: PHYSICAL THERAPIST

## 2024-08-19 ENCOUNTER — THERAPY VISIT (OUTPATIENT)
Dept: PHYSICAL THERAPY | Facility: CLINIC | Age: 74
End: 2024-08-19
Payer: COMMERCIAL

## 2024-08-19 DIAGNOSIS — M54.16 LUMBAR RADICULAR PAIN: ICD-10-CM

## 2024-08-19 DIAGNOSIS — M48.061 FORAMINAL STENOSIS OF LUMBAR REGION: Primary | ICD-10-CM

## 2024-08-19 DIAGNOSIS — M79.18 MYOFASCIAL PAIN: ICD-10-CM

## 2024-08-19 PROCEDURE — 97112 NEUROMUSCULAR REEDUCATION: CPT | Mod: GP | Performed by: PHYSICAL THERAPIST

## 2024-08-19 PROCEDURE — 97140 MANUAL THERAPY 1/> REGIONS: CPT | Mod: GP | Performed by: PHYSICAL THERAPIST

## 2024-08-19 PROCEDURE — 97110 THERAPEUTIC EXERCISES: CPT | Mod: GP | Performed by: PHYSICAL THERAPIST

## 2024-08-22 DIAGNOSIS — M54.16 LUMBAR RADICULAR PAIN: ICD-10-CM

## 2024-08-22 DIAGNOSIS — G62.9 POLYNEUROPATHY: ICD-10-CM

## 2024-08-22 RX ORDER — DULOXETIN HYDROCHLORIDE 30 MG/1
30 CAPSULE, DELAYED RELEASE ORAL DAILY
Qty: 30 CAPSULE | Refills: 0 | Status: SHIPPED | OUTPATIENT
Start: 2024-08-22 | End: 2024-09-17

## 2024-08-26 ENCOUNTER — THERAPY VISIT (OUTPATIENT)
Dept: PHYSICAL THERAPY | Facility: CLINIC | Age: 74
End: 2024-08-26
Payer: COMMERCIAL

## 2024-08-26 DIAGNOSIS — M54.16 LUMBAR RADICULAR PAIN: ICD-10-CM

## 2024-08-26 DIAGNOSIS — M79.18 MYOFASCIAL PAIN: ICD-10-CM

## 2024-08-26 DIAGNOSIS — M48.061 FORAMINAL STENOSIS OF LUMBAR REGION: Primary | ICD-10-CM

## 2024-08-26 PROCEDURE — 97112 NEUROMUSCULAR REEDUCATION: CPT | Mod: GP | Performed by: PHYSICAL THERAPIST

## 2024-08-26 PROCEDURE — 97140 MANUAL THERAPY 1/> REGIONS: CPT | Mod: GP | Performed by: PHYSICAL THERAPIST

## 2024-08-26 PROCEDURE — 97110 THERAPEUTIC EXERCISES: CPT | Mod: GP | Performed by: PHYSICAL THERAPIST

## 2024-09-12 ENCOUNTER — THERAPY VISIT (OUTPATIENT)
Dept: PHYSICAL THERAPY | Facility: CLINIC | Age: 74
End: 2024-09-12
Payer: COMMERCIAL

## 2024-09-12 DIAGNOSIS — M48.061 FORAMINAL STENOSIS OF LUMBAR REGION: Primary | ICD-10-CM

## 2024-09-12 DIAGNOSIS — M54.16 LUMBAR RADICULAR PAIN: ICD-10-CM

## 2024-09-12 DIAGNOSIS — M79.18 MYOFASCIAL PAIN: ICD-10-CM

## 2024-09-12 PROCEDURE — 97112 NEUROMUSCULAR REEDUCATION: CPT | Mod: GP | Performed by: PHYSICAL THERAPIST

## 2024-09-12 PROCEDURE — 97110 THERAPEUTIC EXERCISES: CPT | Mod: GP | Performed by: PHYSICAL THERAPIST

## 2024-09-12 NOTE — PROGRESS NOTES
09/12/24 0500   Appointment Info   Signing clinician's name / credentials Cheryl Beltre, PT, DPT, CMT, OCS   Total/Authorized Visits 8 (E&T)   Visits Used 6   Medical Diagnosis Foraminal stenosis of lumbar region (M48.061)    Lumbar radicular pain (M54.16)    Myofascial pain (M79.18)   PT Tx Diagnosis Low Back Pain, Foraminal stenosis of lumbar region (M48.061)    Lumbar radicular pain (M54.16)    Myofascial pain (M79.18)   Progress Note/Certification   Start of Care Date 07/29/24   Onset of illness/injury or Date of Surgery 06/25/24  (Chronic condition MD order date listed.)   Therapy Frequency 1 time per week   Predicted Duration 8 weeks   Certification date from 07/29/24   Certification date to 09/23/24   Progress Note Due Date 09/23/24   PT Goal 1   Goal Identifier Sitting   Goal Description Patient will sit for 45 min without pain   Rationale to maximize safety and independence with performance of ADLs and functional tasks;to maximize safety and independence within the community;to maximize safety and independence with transportation;to maximize safety and independence with self cares  (To rest from standing)   Goal Progress Met   Target Date 09/23/24   Date Met 09/12/24   Subjective Report   Subjective Report Patient was able to go on her trip.  She sat on a pillow during the car ride and she was able tolerate the car ride.   She went and workedout over at the [employer]. Overall pain has been pretty good.  She is doing better with sitting and standing.  He bridge gives a little hip pain.  She is sitting for 45 minutes + at a time without pain.  Patient feels confident in her ability to take on her own treatment at this time.   Objective Measures   Objective Measures Objective Measure 1;Objective Measure 2   Objective Measure 1   Objective Measure ROM   Details Lumbar AROM: flexion fingertips to shins no pain, Ex: 50% without pain and relieving. all other motions WNL and pain free.   Objective Measure 2   Objective  Measure Palpation   Treatment Interventions (PT)   Interventions Neuromuscular Re-education   Therapeutic Procedure/Exercise   Therapeutic Procedures: strength, endurance, ROM, flexibility minutes (82554) 15   PTRx Ther Proc 1 Warmth   PTRx Ther Proc 1 - Details Instructed to continue   PTRx Ther Proc 2 Standing Sideglide   PTRx Ther Proc 2 - Details HELD   PTRx Ther Proc 3 Supine Butterfly   PTRx Ther Proc 3 - Details HELD   PTRx Ther Proc 4 Standing Extension at Counter Supported   PTRx Ther Proc 4 - Details Verbal review to continue   PTRx Ther Proc 5 Scapular Retraction/Depression   PTRx Ther Proc 5 - Details Verbal review    PTRx Ther Proc 6 Bridging   PTRx Ther Proc 6 - Details 1 x 10 reps 10lbs holding 20 sec.    PTRx Ther Proc 7 Shoulder Theraband Rows   PTRx Ther Proc 7 - Details 1 x 20 reps with verbal and tactile cues. Blue Band   PTRx Ther Proc 8 Sit to Stand   PTRx Ther Proc 8 - Details 1 x 10 reps with cues to avoid using the back of the legs to lever up.  Patient tolerated well.   PTRx Ther Proc 9 Ball Exercise Seated Pelvic Tilt   PTRx Ther Proc 9 - Details HELD   Skilled Intervention Verbal cuing and demonstration for proper performance of exercises.   Patient Response/Progress Patient tolerated well.   Neuromuscular Re-education   Neuromuscular re-ed of mvmt, balance, coord, kinesthetic sense, posture, proprioception minutes (97334) 15   Neuro Re-ed 1 belly breathing   Neuro Re-ed 1 - Details HELD   PTRx Neuro Re-ed 1 Supine Abdominal Exercise #3B (Two Leg Marching)   PTRx Neuro Re-ed 1 - Details 1 x 10 reps with cues to improve TA contraction to progress exercise.   PTRx Neuro Re-ed 2 Abdominal Brace Transverse Abdominis   PTRx Neuro Re-ed 2 - Details 1 x 10 reps then progressed to marching.   PTRx Neuro Re-ed 3 Supine Abdominal Exercise #3 (Marching)   PTRx Neuro Re-ed 3 - Details 1 x 15 reps    Skilled Intervention cueing for TA contraction with ordered breathing.  Patient requires tactile cues  to draw core in.   Patient Response/Progress tolerated well   Education   Learner/Method Patient;Listening;Demonstration;Reading;Pictures/Video;No Barriers to Learning   Education Comments Educated on findings of exam and likely frequency/duration of PT POC.   Plan   Home program see PTrx   Plan for next session Discharge to [facility] at this time.   Total Session Time   Timed Code Treatment Minutes 30   Total Treatment Time (sum of timed and untimed services) 30       PLAN  Discharge to [facility]    Beginning/End Dates of Progress Note Reporting Period:   7/29/2024 to 09/12/2024    Referring Provider:  Serafin Mccain    DISCHARGE  Reason for Discharge: Patient has met all goals.    Equipment Issued: Blue TB     Discharge Plan: Patient to continue home program.    Referring Provider:  Serafin Mccain DO

## 2024-09-17 DIAGNOSIS — M54.16 LUMBAR RADICULAR PAIN: ICD-10-CM

## 2024-09-17 DIAGNOSIS — G62.9 POLYNEUROPATHY: ICD-10-CM

## 2024-09-17 RX ORDER — DULOXETIN HYDROCHLORIDE 30 MG/1
30 CAPSULE, DELAYED RELEASE ORAL DAILY
Qty: 90 CAPSULE | Refills: 1 | Status: SHIPPED | OUTPATIENT
Start: 2024-09-17

## 2024-09-17 NOTE — TELEPHONE ENCOUNTER
Pt left voicemail on nurse navigation line requesting refill of Duloxetine. Pt states she has a few days left of medication and has no refills left on previous rx.     Order prepped. Please review/edit.

## 2024-09-26 DIAGNOSIS — G62.9 POLYNEUROPATHY: ICD-10-CM

## 2024-09-26 DIAGNOSIS — M54.16 LUMBAR RADICULAR PAIN: ICD-10-CM

## 2024-09-26 RX ORDER — PREGABALIN 25 MG/1
25 CAPSULE ORAL 3 TIMES DAILY
Qty: 90 CAPSULE | Refills: 0 | Status: SHIPPED | OUTPATIENT
Start: 2024-09-26

## 2024-10-01 ENCOUNTER — TELEPHONE (OUTPATIENT)
Dept: GASTROENTEROLOGY | Facility: CLINIC | Age: 74
End: 2024-10-01
Payer: COMMERCIAL

## 2024-10-01 NOTE — TELEPHONE ENCOUNTER
"Endoscopy Scheduling Screen    Have you had any respiratory illness or flu-like symptoms in the last 10 days?  No    What is your communication preference for Instructions and/or Bowel Prep?   MyChart    What insurance is in the chart?  Other:  [facility] / MEDICARE    Ordering/Referring Provider:   RADHA RUSH        (If ordering provider performs procedure, schedule with ordering provider unless otherwise instructed. )    BMI: Estimated body mass index is 22.78 kg/m  as calculated from the following:    Height as of 2/15/24: 1.615 m (5' 3.58\").    Weight as of 2/15/24: 59.4 kg (131 lb).     Sedation Ordered  moderate sedation.   If patient BMI > 50 do not schedule in [facility].    If patient BMI > 45 do not schedule at [facility].    Are you taking methadone or Suboxone?  NO, No RN review required.    Have you been diagnosed and are being treated for severe PTSD or severe anxiety?  NO, No RN review required.    Are you taking any prescription medications for pain 3 or more times per week?   NO, No RN review required.    Do you have a history of malignant hyperthermia?  No    (Females) Are you currently pregnant?   No     Have you been diagnosed or told you have pulmonary hypertension?   No    Do you have an LVAD?  No    Have you been told you have moderate to severe sleep apnea?  No.    Have you been told you have COPD, asthma, or any other lung disease?  No    Do you have any heart conditions?  No     Have you ever had or are you waiting for an organ transplant?  No. Continue scheduling, no site restrictions.    Have you had a stroke or transient ischemic attack (TIA aka \"mini stroke\" in the last 6 months?   No    Have you been diagnosed with or been told you have cirrhosis of the liver?   No.    Are you currently on dialysis?   No    Do you need assistance transferring?   No    BMI: Estimated body mass index is 22.78 kg/m  as calculated from the following:    Height as of 2/15/24: 1.615 m (5' 3.58\").    Weight as of 2/15/24: " 59.4 kg (131 lb).     Is patients BMI > 40 and scheduling location [facility]?  No    Do you take an injectable or oral medication for weight loss or diabetes (excluding insulin)?  No    Do you take the medication Naltrexone?  No    Do you take blood thinners?  No       Prep   Are you currently on dialysis or do you have chronic kidney disease?  No    Do you have a diagnosis of diabetes?  No    Do you have a diagnosis of cystic fibrosis (CF)?  No    On a regular basis do you go 3 -5 days between bowel movements?  No    BMI > 40?  No    Preferred Pharmacy:    [facility] [address]  [address] [facility] [address]  Phone: [phone] Fax: [phone]    Final Scheduling Details     Procedure scheduled  Colonoscopy    Surgeon:  AUGIE     Date of procedure:  12/11/2024     Pre-OP / PAC:   No - Not required for this site.    Location  [facility] - Patient preference.    Sedation   Moderate Sedation - Per order.      Patient Reminders:   You will receive a call from a Nurse to review instructions and health history.  This assessment must be completed prior to your procedure.  Failure to complete the Nurse assessment may result in the procedure being cancelled.      On the day of your procedure, please designate an adult(s) who can drive you home stay with you for the next 24 hours. The medicines used in the exam will make you sleepy. You will not be able to drive.      You cannot take public transportation, ride share services, or non-medical taxi service without a responsible caregiver.  Medical transport services are allowed with the requirement that a responsible caregiver will receive you at your destination.  We require that drivers and caregivers are confirmed prior to your procedure.

## 2024-10-03 NOTE — PROGRESS NOTES
Assessment & Plan     Compression fracture of L1 vertebra, initial encounter (H)  Age-related osteoporosis with current pathological fracture, vertebra(e), initial encounter for fracture (H)   Despite reasonable bone density on DEXA, fragility fracture qualifies for diagnosis of severe osteoporosis.  Discussed increasing Mily's daily calcium supplement to 1200 mg daily and continuing with her vitamin D supplement at 1000 IUs daily.  We will check a vitamin D level to make sure this is optimized.  If vitamin D level is appropriate, will plan to start weekly alendronate.  Discussed importance of swallowing pill with large glass of water and remaining upright for an hour afterwards to prevent gastroesophageal complications.  Also discussed minuscule risk of osteonecrosis of the jaw.  Advised that if Tylenol is not enough, it would be okay to try low-dose ibuprofen or naproxen for breakthrough pain so long as she is not taking it regularly.  - Vitamin D Deficiency; Future  -Increase calcium to 1200 mg daily  -Continue vitamin D supplement at 1000 IUs daily  - alendronate (FOSAMAX) 70 MG tablet; Take 1 tablet (70 mg) by mouth every 7 days (waiting until vitamin D level returns before starting)               Follow-up at annual physical or sooner as needed.    No follow-ups on file.    Gustavo Trujillo MD  [facility] LEON Minor is a 72 year old accompanied by her spouse, presenting for the following health issues:  Results    Mily is here for follow-up on chronic back pain and DEXA scan results.  She is struggled with right lower back pain for many years now which sometimes radiates down her anterior right thigh.  No bladder or bowel symptoms, no lower extremity weakness.  She does have neuropathy in her hands and feet that started after taking Taxol 23 years ago for breast cancer.  This seems to be stable, but sometimes leads to difficulty with balance especially right after she  stands up.     More recently Mily had a DEXA scan which showed reasonable bone density, but also detected an L1 fragility fracture, leading to diagnosis of severe osteoporosis.  Patient has been taking 600 mg of elemental calcium daily and 1000 IUs of vitamin D daily.  She tries to stay active and frequently does a Silver sneakers yoga class at the [facility].  She finds that this exercise program makes her feel much better and is good for her pain.  There are occasionally a few leg lift exercises that she has to sit out due to pain but the rest of the program seems to be very good for her.    She often takes up to 2000 mg of Tylenol in a day to control her pain and has been seeing a chiropractor, though she is not sure if the chiropractic treatments are helping anything.  Today she is interested in learning more about her imaging results and figuring out what our next steps will be to help control her pain and prevent further fractures.     Review of Systems   See HPI.        Objective    /63   Pulse 62   Temp 97  F (36.1  C)   Resp 20   Wt 58.4 kg (128 lb 11.2 oz)   SpO2 100%   BMI 20.49 kg/m    Body mass index is 20.49 kg/m .  Physical Exam   GENERAL: healthy, alert and no distress  EYES: Eyes grossly normal to inspection, conjunctivae and sclerae normal  RESP: Breathing comfortably on room air  CV: Warm and well-perfused  ABDOMEN: Appears nondistended  MS: Significant tenderness just to the right side of the mid lumbar spine.  No midline tenderness.  SKIN: no obvious suspicious lesions or rashes on uncovered skin  NEURO: Normal strength and tone, mentation intact and speech normal  PSYCH: mentation appears normal, affect normal/bright    Physician Attestation   STAFF NOTE:  Patient discussed with resident physician today.  We discussed robles portions of the visit and I participated in the evaluation and management of the patient today.     Start alendronate. Repeat dexa in 3-5 years.    Maureen Martines  MD  Internal Medicine-Pediatrics            .  ..   No

## 2024-10-19 DIAGNOSIS — M54.16 LUMBAR RADICULAR PAIN: ICD-10-CM

## 2024-10-19 DIAGNOSIS — G62.9 POLYNEUROPATHY: ICD-10-CM

## 2024-10-21 RX ORDER — PREGABALIN 25 MG/1
25 CAPSULE ORAL 3 TIMES DAILY
Qty: 90 CAPSULE | Refills: 2 | Status: SHIPPED | OUTPATIENT
Start: 2024-10-21

## 2024-10-30 DIAGNOSIS — S32.010A COMPRESSION FRACTURE OF L1 VERTEBRA, INITIAL ENCOUNTER (H): ICD-10-CM

## 2024-10-30 RX ORDER — ALENDRONATE SODIUM 70 MG/1
TABLET ORAL
Qty: 12 TABLET | Refills: 0 | Status: SHIPPED | OUTPATIENT
Start: 2024-10-30

## 2024-12-11 ENCOUNTER — HOSPITAL ENCOUNTER (OUTPATIENT)
Facility: CLINIC | Age: 74
Discharge: HOME | End: 2024-12-11
Attending: INTERNAL MEDICINE | Admitting: INTERNAL MEDICINE
Payer: COMMERCIAL

## 2024-12-11 VITALS
RESPIRATION RATE: 16 BRPM | BODY MASS INDEX: 21.83 KG/M2 | OXYGEN SATURATION: 95 % | DIASTOLIC BLOOD PRESSURE: 62 MMHG | HEIGHT: 65 IN | WEIGHT: 131 LBS | SYSTOLIC BLOOD PRESSURE: 110 MMHG | HEART RATE: 59 BPM

## 2024-12-11 LAB — COLONOSCOPY: NORMAL

## 2024-12-11 PROCEDURE — G0500 MOD SEDAT ENDO SERVICE >5YRS: HCPCS | Performed by: INTERNAL MEDICINE

## 2024-12-11 PROCEDURE — G0105 COLORECTAL SCRN; HI RISK IND: HCPCS | Performed by: INTERNAL MEDICINE

## 2024-12-11 PROCEDURE — 250N000011 HC RX IP 250 OP 636: Performed by: INTERNAL MEDICINE

## 2024-12-11 PROCEDURE — 45378 DIAGNOSTIC COLONOSCOPY: CPT | Performed by: INTERNAL MEDICINE

## 2024-12-11 RX ORDER — ATROPINE SULFATE 0.1 MG/ML
1 INJECTION INTRAVENOUS
Status: DISCONTINUED | OUTPATIENT
Start: 2024-12-11 | End: 2024-12-11 | Stop reason: HOSPADM

## 2024-12-11 RX ORDER — ONDANSETRON 2 MG/ML
4 INJECTION INTRAMUSCULAR; INTRAVENOUS
Status: DISCONTINUED | OUTPATIENT
Start: 2024-12-11 | End: 2024-12-11 | Stop reason: HOSPADM

## 2024-12-11 RX ORDER — ONDANSETRON 2 MG/ML
4 INJECTION INTRAMUSCULAR; INTRAVENOUS EVERY 6 HOURS PRN
Status: DISCONTINUED | OUTPATIENT
Start: 2024-12-11 | End: 2024-12-11 | Stop reason: HOSPADM

## 2024-12-11 RX ORDER — PROCHLORPERAZINE MALEATE 5 MG/1
5 TABLET ORAL EVERY 6 HOURS PRN
Status: DISCONTINUED | OUTPATIENT
Start: 2024-12-11 | End: 2024-12-11 | Stop reason: HOSPADM

## 2024-12-11 RX ORDER — FLUMAZENIL 0.1 MG/ML
0.2 INJECTION, SOLUTION INTRAVENOUS
Status: DISCONTINUED | OUTPATIENT
Start: 2024-12-11 | End: 2024-12-11 | Stop reason: HOSPADM

## 2024-12-11 RX ORDER — DIPHENHYDRAMINE HYDROCHLORIDE 50 MG/ML
25-50 INJECTION INTRAMUSCULAR; INTRAVENOUS
Status: DISCONTINUED | OUTPATIENT
Start: 2024-12-11 | End: 2024-12-11 | Stop reason: HOSPADM

## 2024-12-11 RX ORDER — NALOXONE HYDROCHLORIDE 0.4 MG/ML
0.4 INJECTION, SOLUTION INTRAMUSCULAR; INTRAVENOUS; SUBCUTANEOUS
Status: DISCONTINUED | OUTPATIENT
Start: 2024-12-11 | End: 2024-12-11 | Stop reason: HOSPADM

## 2024-12-11 RX ORDER — EPINEPHRINE 1 MG/ML
0.1 INJECTION, SOLUTION, CONCENTRATE INTRAVENOUS
Status: DISCONTINUED | OUTPATIENT
Start: 2024-12-11 | End: 2024-12-11 | Stop reason: HOSPADM

## 2024-12-11 RX ORDER — FENTANYL CITRATE 50 UG/ML
50-100 INJECTION, SOLUTION INTRAMUSCULAR; INTRAVENOUS EVERY 5 MIN PRN
Status: DISCONTINUED | OUTPATIENT
Start: 2024-12-11 | End: 2024-12-11 | Stop reason: HOSPADM

## 2024-12-11 RX ORDER — ONDANSETRON 4 MG/1
4 TABLET, ORALLY DISINTEGRATING ORAL EVERY 6 HOURS PRN
Status: DISCONTINUED | OUTPATIENT
Start: 2024-12-11 | End: 2024-12-11 | Stop reason: HOSPADM

## 2024-12-11 RX ORDER — NALOXONE HYDROCHLORIDE 0.4 MG/ML
0.2 INJECTION, SOLUTION INTRAMUSCULAR; INTRAVENOUS; SUBCUTANEOUS
Status: DISCONTINUED | OUTPATIENT
Start: 2024-12-11 | End: 2024-12-11 | Stop reason: HOSPADM

## 2024-12-11 RX ORDER — SIMETHICONE 40MG/0.6ML
133 SUSPENSION, DROPS(FINAL DOSAGE FORM)(ML) ORAL
Status: DISCONTINUED | OUTPATIENT
Start: 2024-12-11 | End: 2024-12-11 | Stop reason: HOSPADM

## 2024-12-11 RX ORDER — LIDOCAINE 40 MG/G
CREAM TOPICAL
Status: DISCONTINUED | OUTPATIENT
Start: 2024-12-11 | End: 2024-12-11 | Stop reason: HOSPADM

## 2024-12-11 RX ADMIN — FENTANYL CITRATE 50 MCG: 50 INJECTION, SOLUTION INTRAMUSCULAR; INTRAVENOUS at 08:00

## 2024-12-11 RX ADMIN — MIDAZOLAM HYDROCHLORIDE 1 MG: 1 INJECTION, SOLUTION INTRAMUSCULAR; INTRAVENOUS at 08:05

## 2024-12-11 RX ADMIN — FENTANYL CITRATE 50 MCG: 50 INJECTION, SOLUTION INTRAMUSCULAR; INTRAVENOUS at 08:05

## 2024-12-11 RX ADMIN — MIDAZOLAM HYDROCHLORIDE 1 MG: 1 INJECTION, SOLUTION INTRAMUSCULAR; INTRAVENOUS at 08:00

## 2024-12-11 ASSESSMENT — ACTIVITIES OF DAILY LIVING (ADL)
ADLS_ACUITY_SCORE: 41
ADLS_ACUITY_SCORE: 41

## 2024-12-11 NOTE — H&P
Pre-Endoscopy History and Physical     Mily Hilliard MRN# [id number]   YOB: 1950 Age: 74 year old     Date of Procedure: 2024  Primary care provider: Meri Pacheco  Type of Endoscopy: Colonoscopy with possible biopsy, possible polypectomy  Reason for Procedure: polyp  Type of Anesthesia Anticipated: Conscious Sedation    HPI:    Mily is a 74 year old female who will be undergoing the above procedure.      A history and physical has been performed. The patient's medications and allergies have been reviewed. The risks and benefits of the procedure and the sedation options and risks were discussed with the patient.  All questions were answered and informed consent was obtained.      She denies a personal or family history of anesthesia complications or bleeding disorders.     Patient Active Problem List   Diagnosis    Hypothyroidism    Hypertension    Hypercholesterolemia    Essential tremor    Age-related osteoporosis with current pathological fracture with routine healing        Past Medical History:   Diagnosis Date    Arthritis     Breast cancer (H)     right breast- lumpectomy, lymph 15/19 pos - radiation and chemo    Hypercholesterolemia     Hypertension     Hypothyroidism         Past Surgical History:   Procedure Laterality Date    Bilateral Foot surgery      COLONOSCOPY  2014    Dr. Naylor [facility]    COLONOSCOPY N/A 2019    Procedure: COLONOSCOPY;  Surgeon: Catrachito Naylor MD;  Location:  GI    EYE SURGERY      cataract removal, macular hole repair    LUMPECTOMY BREAST      Right       Social History     Tobacco Use    Smoking status: Former     Current packs/day: 0.00     Types: Cigarettes     Quit date: 2003     Years since quittin.9    Smokeless tobacco: Never   Substance Use Topics    Alcohol use: Not Currently     Alcohol/week: 11.7 standard drinks of alcohol     Types: 12 Standard drinks or equivalent per week       Family History   Problem  Relation Age of Onset    Cancer - colorectal Mother         at age 94    Breast Cancer Mother     Macular Degeneration Father     Bladder Cancer Father     Diabetes Sister         pre-diabetic    Diabetes Sister     Breast Cancer Sister     No Known Problems Brother     No Known Problems Brother     Stomach Cancer Maternal Grandfather        Prior to Admission medications    Medication Sig Start Date End Date Taking? Authorizing Provider   alendronate (FOSAMAX) 70 MG tablet Take 1 tablet (70 mg) by mouth once every 7 days 10/30/24   Meri Pacheco MD   amLODIPine (NORVASC) 5 MG tablet Take 1 tablet (5 mg) by mouth daily 2/13/24   Priscila Nichole PA-C   aspirin (ASA) 81 MG chewable tablet Take 81 mg by mouth daily    Reported, Patient   atorvastatin (LIPITOR) 20 MG tablet Take 1 tablet (20 mg) by mouth once daily 2/13/24   Priscila Nichole PA-C   calcium acetate (PHOSLO) 667 MG CAPS capsule Take 667 mg by mouth daily    Reported, Patient   DULoxetine (CYMBALTA) 20 MG capsule Take 1 capsule (20 mg) by mouth daily 5/13/24   Serafin Mccain DO   DULoxetine (CYMBALTA) 30 MG capsule Take 1 capsule (30 mg) by mouth daily. 9/17/24   Serafin Mccain DO   levothyroxine (SYNTHROID/LEVOTHROID) 75 MCG tablet TAKE ONE TABLET BY MOUTH ONE TIME DAILY 2/13/24   Priscila Nichole PA-C   lisinopril (ZESTRIL) 40 MG tablet Take 1 tablet (40 mg) by mouth daily 2/13/24   Priscila Nichole PA-C   Multiple Vitamins-Minerals (MULTIVITAMIN PO)     Reported, Patient   pregabalin (LYRICA) 25 MG capsule Take 1 capsule (25 mg) by mouth 3 times daily 10/21/24   Serafin Mccain DO   primidone (MYSOLINE) 250 MG tablet Take 1 tablet (250 mg) by mouth at bedtime 2/13/24   Priscila Nichole PA-C   Vitamin D, Cholecalciferol, 25 MCG (1000 UT) TABS     Reported, Patient       Allergies   Allergen Reactions    No Known Allergies         REVIEW OF SYSTEMS:   5 point ROS negative except as noted above in HPI, including Gen., Resp., CV, GI &  system  "review.    PHYSICAL EXAM:   There were no vitals taken for this visit. Estimated body mass index is 22.78 kg/m  as calculated from the following:    Height as of 2/15/24: 1.615 m (5' 3.58\").    Weight as of 2/15/24: 59.4 kg (131 lb).   GENERAL APPEARANCE: alert, and oriented  MENTAL STATUS: alert  AIRWAY EXAM: Mallampatti Class I (visualization of the soft palate, fauces, uvula, anterior and posterior pillars)  RESP: lungs clear to auscultation - no rales, rhonchi or wheezes  CV: regular rates and rhythm  DIAGNOSTICS:    Not indicated    IMPRESSION   ASA Class 2 - Mild systemic disease    PLAN:   Plan for Colonoscopy with possible biopsy, possible polypectomy. We discussed the risks, benefits and alternatives and the patient wished to proceed.    The above has been forwarded to the consulting provider.      Signed Electronically by: Catrachito Naylor MD  December 11, 2024          "

## 2024-12-20 ENCOUNTER — MYC MEDICAL ADVICE (OUTPATIENT)
Dept: PEDIATRICS | Facility: CLINIC | Age: 74
End: 2024-12-20
Payer: COMMERCIAL

## 2024-12-21 ENCOUNTER — OFFICE VISIT (OUTPATIENT)
Dept: URGENT CARE | Facility: URGENT CARE | Age: 74
End: 2024-12-21
Payer: COMMERCIAL

## 2024-12-21 VITALS
DIASTOLIC BLOOD PRESSURE: 83 MMHG | BODY MASS INDEX: 21.8 KG/M2 | TEMPERATURE: 97.9 F | SYSTOLIC BLOOD PRESSURE: 146 MMHG | WEIGHT: 131 LBS | OXYGEN SATURATION: 99 % | HEART RATE: 83 BPM

## 2024-12-21 DIAGNOSIS — R30.0 DYSURIA: ICD-10-CM

## 2024-12-21 DIAGNOSIS — N30.00 ACUTE CYSTITIS WITHOUT HEMATURIA: Primary | ICD-10-CM

## 2024-12-21 LAB
ALBUMIN UR-MCNC: >=300 MG/DL
APPEARANCE UR: ABNORMAL
BACTERIA #/AREA URNS HPF: ABNORMAL /HPF
BILIRUB UR QL STRIP: NEGATIVE
CLUE CELLS: NORMAL
COLOR UR AUTO: YELLOW
GLUCOSE UR STRIP-MCNC: NEGATIVE MG/DL
HGB UR QL STRIP: ABNORMAL
KETONES UR STRIP-MCNC: NEGATIVE MG/DL
LEUKOCYTE ESTERASE UR QL STRIP: ABNORMAL
NITRATE UR QL: NEGATIVE
PH UR STRIP: 6 [PH] (ref 5–7)
RBC #/AREA URNS AUTO: ABNORMAL /HPF
SP GR UR STRIP: 1.02 (ref 1–1.03)
TRICHOMONAS, WET PREP: NORMAL
UROBILINOGEN UR STRIP-ACNC: 0.2 E.U./DL
WBC #/AREA URNS AUTO: >100 /HPF
WBC'S/HIGH POWER FIELD, WET PREP: NORMAL
YEAST, WET PREP: NORMAL

## 2024-12-21 PROCEDURE — 87210 SMEAR WET MOUNT SALINE/INK: CPT | Performed by: PHYSICIAN ASSISTANT

## 2024-12-21 PROCEDURE — 87086 URINE CULTURE/COLONY COUNT: CPT | Performed by: PHYSICIAN ASSISTANT

## 2024-12-21 PROCEDURE — 99214 OFFICE O/P EST MOD 30 MIN: CPT | Performed by: PHYSICIAN ASSISTANT

## 2024-12-21 PROCEDURE — 81001 URINALYSIS AUTO W/SCOPE: CPT | Performed by: PHYSICIAN ASSISTANT

## 2024-12-21 RX ORDER — CEPHALEXIN 500 MG/1
500 CAPSULE ORAL 2 TIMES DAILY
Qty: 14 CAPSULE | Refills: 0 | Status: SHIPPED | OUTPATIENT
Start: 2024-12-21 | End: 2024-12-28

## 2024-12-21 NOTE — PROGRESS NOTES
SUBJECTIVE:  Mily Hilliard is a 74 year old female who comes in with concerns for continued UTI.  Patient states that she had a colonoscopy on December 12.  Feels like she had some symptoms prior to that.  [facility]oscopy [facility] 5-day course of Macrobid.  Symptoms did improve but never fully went away.  She still having increased urgency, pressure and cloudy urine.  She has not had any fevers or blood in her urine.  Denies any nausea, vomiting, fever.  She has had some mild discomfort on the right side but does have history of back issues.  Has been using some over-the-counter cranberry and herbal supplements.  She is otherwise at baseline health.    Past Medical History:   Diagnosis Date    Arthritis     Breast cancer (H) 2000    right breast- lumpectomy, lymph 15/19 pos - radiation and chemo    Hypercholesterolemia     Hypertension     Hypothyroidism      Patient Active Problem List   Diagnosis    Hypothyroidism    Hypertension    Hypercholesterolemia    Essential tremor    Age-related osteoporosis with current pathological fracture with routine healing     Current Outpatient Medications   Medication Sig Dispense Refill    alendronate (FOSAMAX) 70 MG tablet Take 1 tablet (70 mg) by mouth once every 7 days 12 tablet 0    amLODIPine (NORVASC) 5 MG tablet Take 1 tablet (5 mg) by mouth daily 90 tablet 3    aspirin (ASA) 81 MG chewable tablet Take 81 mg by mouth daily      atorvastatin (LIPITOR) 20 MG tablet Take 1 tablet (20 mg) by mouth once daily 90 tablet 3    calcium acetate (PHOSLO) 667 MG CAPS capsule Take 667 mg by mouth daily      DULoxetine (CYMBALTA) 20 MG capsule Take 1 capsule (20 mg) by mouth daily 30 capsule 1    DULoxetine (CYMBALTA) 30 MG capsule Take 1 capsule (30 mg) by mouth daily. 90 capsule 1    levothyroxine (SYNTHROID/LEVOTHROID) 75 MCG tablet TAKE ONE TABLET BY MOUTH ONE TIME DAILY 90 tablet 3    lisinopril (ZESTRIL) 40 MG tablet Take 1  tablet (40 mg) by mouth daily 90 tablet 3    Multiple Vitamins-Minerals (MULTIVITAMIN PO)       pregabalin (LYRICA) 25 MG capsule Take 1 capsule (25 mg) by mouth 3 times daily 90 capsule 2    primidone (MYSOLINE) 250 MG tablet Take 1 tablet (250 mg) by mouth at bedtime 90 tablet 3    Vitamin D, Cholecalciferol, 25 MCG (1000 UT) TABS        Current Facility-Administered Medications   Medication Dose Route Frequency Provider Last Rate Last Admin    1.0 mL bupivacaine (MARCAINE) 0.5% injection (50 mL vial)  1 mL   Phong Almanzar MD   1 mL at 23 0959    2.0 mL bupivacaine (MARCAINE) 0.5% injection (50 mL vial)  2 mL   Phong Almanzar MD   2 mL at 23 1006    lidocaine 1 % injection 2 mL  2 mL   Phong Almanzar MD   2 mL at 23 1006    lidocaine 1 % injection 2 mL  2 mL   Phong Almanzar MD   2 mL at 23 0959    triamcinolone (KENALOG-40) injection 40 mg  40 mg   Phong Almanzar MD   40 mg at 23 1006    triamcinolone (KENALOG-40) injection 40 mg  40 mg   Phong Almanzar MD   40 mg at 23 0959     Social History     Socioeconomic History    Marital status:      Spouse name: Not on file    Number of children: Not on file    Years of education: Not on file    Highest education level: Not on file   Occupational History    Not on file   Tobacco Use    Smoking status: Former     Current packs/day: 0.00     Types: Cigarettes     Quit date: 2003     Years since quittin.9    Smokeless tobacco: Never   Vaping Use    Vaping status: Never Used   Substance and Sexual Activity    Alcohol use: Not Currently     Alcohol/week: 11.7 standard drinks of alcohol     Types: 12 Standard drinks or equivalent per week    Drug use: No    Sexual activity: Not Currently     Partners: Male     Birth control/protection: None   Other Topics Concern    Parent/sibling w/ CABG, MI or angioplasty before 65F 55M? No   Social History Narrative    Lives with . In own home.    Four adult children. Grand- 7,      Work- retired business, retail.    Drives. Socializes.      Social Drivers of Health     Financial Resource Strain: Low Risk  (2/9/2024)    Financial Resource Strain     Within the past 12 months, have you or your family members you live with been unable to get utilities (heat, electricity) when it was really needed?: No   Food Insecurity: Low Risk  (2/9/2024)    Food Insecurity     Within the past 12 months, did you worry that your food would run out before you got money to buy more?: No     Within the past 12 months, did the food you bought just not last and you didn t have money to get more?: No   Transportation Needs: Low Risk  (2/9/2024)    Transportation Needs     Within the past 12 months, has lack of transportation kept you from medical appointments, getting your medicines, non-medical meetings or appointments, work, or from getting things that you need?: No   Physical Activity: Insufficiently Active (2/9/2024)    Exercise Vital Sign     Days of Exercise per Week: 3 days     Minutes of Exercise per Session: 30 min   Stress: No Stress Concern Present (2/9/2024)    [employer] - Occupational Stress Questionnaire     Feeling of Stress : Not at all   Social Connections: Unknown (2/9/2024)    Social Connection and Isolation Panel [NHANES]     Frequency of Communication with Friends and Family: Not on file     Frequency of Social Gatherings with Friends and Family: Once a week     Attends Christian Services: Not on file     Active Member of Clubs or Organizations: Not on file     Attends Club or Organization Meetings: Not on file     Marital Status: Not on file   Interpersonal Safety: Low Risk  (2/13/2024)    Interpersonal Safety     Do you feel physically and emotionally safe where you currently live?: Yes     Within the past 12 months, have you been hit, slapped, kicked or otherwise physically hurt by someone?: No     Within the past 12 months, have you been humiliated or  emotionally abused in other ways by your partner or ex-partner?: No   Housing Stability: Low Risk  (2/9/2024)    Housing Stability     Do you have housing? : Yes     Are you worried about losing your housing?: No     ROS negative other than stated above    Exam:  GENERAL APPEARANCE: healthy, alert and no distress  EYES: EOMI,  PERRL  RESP: lungs clear to auscultation - no rales, rhonchi or wheezes  CV: regular rates and rhythm, normal S1 S2, no S3 or S4 and no murmur, click or rub -  ABDOMEN: Soft nontender no CVA tenderness noted  SKIN: no suspicious lesions or rashes    Results for orders placed or performed in visit on 12/21/24   UA Macroscopic with reflex to Microscopic and Culture - Clinic Collect     Status: Abnormal    Specimen: Urine, Midstream   Result Value Ref Range    Color Urine Yellow Colorless, Straw, Light Yellow, Yellow    Appearance Urine Cloudy (A) Clear    Glucose Urine Negative Negative mg/dL    Bilirubin Urine Negative Negative    Ketones Urine Negative Negative mg/dL    Specific Gravity Urine 1.020 1.003 - 1.035    Blood Urine Trace (A) Negative    pH Urine 6.0 5.0 - 7.0    Protein Albumin Urine >=300 (A) Negative mg/dL    Urobilinogen Urine 0.2 0.2, 1.0 E.U./dL    Nitrite Urine Negative Negative    Leukocyte Esterase Urine Small (A) Negative   UA Microscopic with Reflex to Culture     Status: Abnormal   Result Value Ref Range    Bacteria Urine Many (A) None Seen /HPF    RBC Urine 2-5 (A) 0-2 /HPF /HPF    WBC Urine >100 (A) 0-5 /HPF /HPF   Wet prep - Clinic Collect     Status: Normal    Specimen: Vagina; Swab   Result Value Ref Range    Trichomonas Absent Absent    Yeast Absent Absent    Clue Cells Absent Absent    WBCs/high power field None None       assessment/plan:  (N30.00) Acute cystitis without hematuria  (primary encounter diagnosis)  Comment:   Plan: cephALEXin (KEFLEX) 500 MG capsule          Patient with continued UTI related symptoms.  She had a recent UTI treated with Macrobid.   Symptoms never fully went away but culture was not obtained.  She does show evidence for UTI at this time but no evidence for pyelonephritis.  Her wet prep was clear.  Will give trial of Keflex for 1 week.  Sensitivities pending.  Continue to push fluids and over-the-counter med for symptomatic relief.  Prevention was discussed and red flag signs were reviewed.  Will return to clinic if symptoms worsen or new symptoms develop.    (R30.0) Dysuria  Comment:   Plan: UA Macroscopic with reflex to Microscopic and         Culture - Clinic Collect, Wet prep - Clinic         Collect, UA Microscopic with Reflex to Culture,        Urine Culture

## 2024-12-22 LAB — BACTERIA UR CULT: NORMAL

## 2024-12-26 ENCOUNTER — MYC MEDICAL ADVICE (OUTPATIENT)
Dept: PEDIATRICS | Facility: CLINIC | Age: 74
End: 2024-12-26
Payer: COMMERCIAL

## 2024-12-30 NOTE — TELEPHONE ENCOUNTER
Dr. Pacheco- see Stylefie message.   - pt has been on Macrobid and Keflex for UTI   *went to  12/21   - still experiencing urgency   - still has edema in legs and ankles     Please advise if pt needs another in person visit or e-visit appropriate?   Kalani Chavez RN

## 2024-12-30 NOTE — TELEPHONE ENCOUNTER
RN received call back from patient. Scheduled for Office visit 12/31/24 w/ XIMENA. per notes below.     Ignacio ESTES RN 12/30/2024 at 2:21 PM

## 2024-12-30 NOTE — TELEPHONE ENCOUNTER
A&O to self, unable to redirect. VSS. Afebrile. No c/o pain. Predex gtt decreased overnight and then increased for agitation this AM. Lino inplace with good UOP. No BM. Unable to take PM meds due to inability to follow commands.    Needs appointment.   Kt Simeon PA-C

## 2024-12-30 NOTE — TELEPHONE ENCOUNTER
Called pt and left VM to call back to speak with a triage nurse.   Sent pt MyChart message advising apt is needed.     Upon call back:  Please schedule pt for in person apt.     Kalani Chavez RN

## 2024-12-31 ENCOUNTER — OFFICE VISIT (OUTPATIENT)
Dept: PEDIATRICS | Facility: CLINIC | Age: 74
End: 2024-12-31
Payer: COMMERCIAL

## 2024-12-31 VITALS
RESPIRATION RATE: 16 BRPM | HEART RATE: 57 BPM | SYSTOLIC BLOOD PRESSURE: 118 MMHG | BODY MASS INDEX: 22.73 KG/M2 | WEIGHT: 136.6 LBS | OXYGEN SATURATION: 99 % | TEMPERATURE: 97.1 F | DIASTOLIC BLOOD PRESSURE: 70 MMHG

## 2024-12-31 DIAGNOSIS — R39.15 URINARY URGENCY: Primary | ICD-10-CM

## 2024-12-31 DIAGNOSIS — R60.0 LOCALIZED EDEMA: ICD-10-CM

## 2024-12-31 LAB
ALBUMIN UR-MCNC: >=300 MG/DL
APPEARANCE UR: CLEAR
BACTERIA #/AREA URNS HPF: ABNORMAL /HPF
BILIRUB UR QL STRIP: NEGATIVE
COLOR UR AUTO: YELLOW
GLUCOSE UR STRIP-MCNC: NEGATIVE MG/DL
HGB UR QL STRIP: NEGATIVE
KETONES UR STRIP-MCNC: NEGATIVE MG/DL
LEUKOCYTE ESTERASE UR QL STRIP: ABNORMAL
MUCOUS THREADS #/AREA URNS LPF: PRESENT /LPF
NITRATE UR QL: NEGATIVE
PH UR STRIP: 6 [PH] (ref 5–7)
RBC #/AREA URNS AUTO: ABNORMAL /HPF
SP GR UR STRIP: 1.02 (ref 1–1.03)
SQUAMOUS #/AREA URNS AUTO: ABNORMAL /LPF
TRANS CELLS #/AREA URNS HPF: ABNORMAL /HPF
UROBILINOGEN UR STRIP-ACNC: 0.2 E.U./DL
WBC #/AREA URNS AUTO: ABNORMAL /HPF

## 2024-12-31 PROCEDURE — 81001 URINALYSIS AUTO W/SCOPE: CPT | Performed by: PHYSICIAN ASSISTANT

## 2024-12-31 PROCEDURE — 87086 URINE CULTURE/COLONY COUNT: CPT | Performed by: PHYSICIAN ASSISTANT

## 2024-12-31 PROCEDURE — 99214 OFFICE O/P EST MOD 30 MIN: CPT | Performed by: PHYSICIAN ASSISTANT

## 2024-12-31 RX ORDER — FUROSEMIDE 20 MG/1
20 TABLET ORAL DAILY
Qty: 7 TABLET | Refills: 0 | Status: SHIPPED | OUTPATIENT
Start: 2024-12-31

## 2024-12-31 ASSESSMENT — PAIN SCALES - GENERAL: PAINLEVEL_OUTOF10: SEVERE PAIN (6)

## 2024-12-31 NOTE — PATIENT INSTRUCTIONS
1 begin lasix daily in the am x one week  2 I will contact you regarding urine results  3. Return for labs on Tues, jan 7 at 1030 am

## 2024-12-31 NOTE — PROGRESS NOTES
Assessment & Plan     Urinary urgency  UC pending. Hold off on antibiotics.   - UA Macroscopic with reflex to Microscopic and Culture - Clinic Collect  - UA Microscopic with Reflex to Culture  - Urine Culture Aerobic Bacterial - lab collect; Future  - Urine Culture Aerobic Bacterial - lab collect    Localized edema  Begin lasix daily x one week. Repeat labs in one week.  - Basic metabolic panel  (Ca, Cl, CO2, Creat, Gluc, K, Na, BUN); Future  - furosemide (LASIX) 20 MG tablet; Take 1 tablet (20 mg) by mouth daily.    Daniel Minor is a 74 year old, presenting for the following health issues:  Urinary Problem        12/31/2024     9:48 AM   Additional Questions   Roomed by RHS   Accompanied by self     HPI   ED/UC Followup:    Facility:  [facility]   Date of visit: 12/21/2024  Reason for visit: UTI   Current Status: still experiencing urgency, still has edema in legs and ankles this is patient's main concern. Patient states she completed the treatment Macrobid and Keflex         Review of Systems  Constitutional, HEENT, cardiovascular, pulmonary, gi and gu systems are negative, except as otherwise noted.      Objective    /70 (BP Location: Left arm, Patient Position: Sitting, Cuff Size: Adult Regular)   Pulse 57   Temp 97.1  F (36.2  C) (Temporal)   Resp 16   Wt 62 kg (136 lb 9.6 oz)   SpO2 99%   BMI 22.73 kg/m    Body mass index is 22.73 kg/m .  Physical Exam   GENERAL: alert and no distress  NECK: no adenopathy, no asymmetry, masses, or scars  RESP: lungs clear to auscultation - no rales, rhonchi or wheezes  CV: regular rate and rhythm, normal S1 S2, no S3 or S4, no murmur, click or rub, no peripheral edema  ABDOMEN: soft, nontender  LE: edema of the LE bilaterally L>R    Results for orders placed or performed in visit on 12/31/24   UA Macroscopic with reflex to Microscopic and Culture - Clinic Collect     Status: Abnormal    Specimen: Urine, Clean Catch   Result Value Ref Range    Color  Urine Yellow Colorless, Straw, Light Yellow, Yellow    Appearance Urine Clear Clear    Glucose Urine Negative Negative mg/dL    Bilirubin Urine Negative Negative    Ketones Urine Negative Negative mg/dL    Specific Gravity Urine 1.020 1.003 - 1.035    Blood Urine Negative Negative    pH Urine 6.0 5.0 - 7.0    Protein Albumin Urine >=300 (A) Negative mg/dL    Urobilinogen Urine 0.2 0.2, 1.0 E.U./dL    Nitrite Urine Negative Negative    Leukocyte Esterase Urine Trace (A) Negative   UA Microscopic with Reflex to Culture     Status: Abnormal   Result Value Ref Range    Bacteria Urine Moderate (A) None Seen /HPF    RBC Urine 0-2 0-2 /HPF /HPF    WBC Urine 5-10 (A) 0-5 /HPF /HPF    Squamous Epithelials Urine Moderate (A) None Seen /LPF    Transitional Epithelials Urine Few (A) None Seen /HPF    Mucus Urine Present (A) None Seen /LPF    Narrative    Urine Culture not indicated           Signed Electronically by: Kt Simeon PA-C

## 2025-01-01 LAB — BACTERIA UR CULT: NORMAL

## 2025-01-07 ENCOUNTER — LAB (OUTPATIENT)
Dept: LAB | Facility: CLINIC | Age: 75
End: 2025-01-07
Payer: COMMERCIAL

## 2025-01-07 DIAGNOSIS — R60.0 LOCALIZED EDEMA: ICD-10-CM

## 2025-01-07 LAB
ANION GAP SERPL CALCULATED.3IONS-SCNC: 9 MMOL/L (ref 7–15)
BUN SERPL-MCNC: 16.9 MG/DL (ref 8–23)
CALCIUM SERPL-MCNC: 9.5 MG/DL (ref 8.8–10.4)
CHLORIDE SERPL-SCNC: 104 MMOL/L (ref 98–107)
CREAT SERPL-MCNC: 0.77 MG/DL (ref 0.51–0.95)
EGFRCR SERPLBLD CKD-EPI 2021: 80 ML/MIN/1.73M2
GLUCOSE SERPL-MCNC: 89 MG/DL (ref 70–99)
HCO3 SERPL-SCNC: 26 MMOL/L (ref 22–29)
POTASSIUM SERPL-SCNC: 4.9 MMOL/L (ref 3.4–5.3)
SODIUM SERPL-SCNC: 139 MMOL/L (ref 135–145)

## 2025-01-07 PROCEDURE — 36415 COLL VENOUS BLD VENIPUNCTURE: CPT

## 2025-01-07 PROCEDURE — 80048 BASIC METABOLIC PNL TOTAL CA: CPT

## 2025-01-23 DIAGNOSIS — S32.010A COMPRESSION FRACTURE OF L1 VERTEBRA, INITIAL ENCOUNTER (H): ICD-10-CM

## 2025-01-23 RX ORDER — ALENDRONATE SODIUM 70 MG/1
TABLET ORAL
Qty: 12 TABLET | Refills: 0 | Status: SHIPPED | OUTPATIENT
Start: 2025-01-23

## 2025-01-23 NOTE — PROGRESS NOTES
Assessment/Plan:      Mily was seen today for back pain.    Diagnoses and all orders for this visit:    Lumbar spine pain    Foraminal stenosis of lumbar region    Lumbar facet arthropathy    Other idiopathic scoliosis, thoracolumbar region    Sagittal plane imbalance    Pain in thoracic spine    Myofascial pain    Polyneuropathy  -     pregabalin (LYRICA) 25 MG capsule; Take 25 mg in the morning, 25 mg the afternoon and 50 mg at bedtime  -     DULoxetine (CYMBALTA) 30 MG capsule; Take 1 capsule (30 mg) by mouth daily.    Lumbar radicular pain  -     pregabalin (LYRICA) 25 MG capsule; Take 25 mg in the morning, 25 mg the afternoon and 50 mg at bedtime  -     DULoxetine (CYMBALTA) 30 MG capsule; Take 1 capsule (30 mg) by mouth daily.         Assessment: Pleasant 74 year old female  with a history of Hypothyroidism,with a history of hypertension, with a history of hypertension, hypothyroidism, breast cancer treated with chemotherapy and subsequent polyneuropathy, hyperlipidemia, osteoporosis with:     1.  Recent increase in right-sided lumbar spine pain shooting to the thoracolumbar region.  History of right lumbar spine and right lower extremity proximal radicular pain in an L4 distribution.  She has severe right L4-5 foraminal stenosis related to degenerative disc disease and facet arthropathy in the setting of lumbar scoliosis.  Right lumbar radicular pain has been managed with a right L4-5 TFESI x 2 over the past year.  Failed medial branch blocks in 2023.  She also has had physical therapy in the past.  EMG has been negative for radiculopathy.  Failed gabapentin.  Taking Tylenol at bedtime.  Best benefit is a right L4-5 TFESI most recently done January 10, 2024.  Not a great surgical candidate per Dr. Vigil due to complexity of the surgery.  Has had some improvement with pregabalin 25 mg 3 times daily tolerating this well.  Duloxetine 30 mg daily tolerated well.  Has had improvement with walking 3 times a week  1 mile on the treadmill.  Radicular pain has been well-controlled with pregabalin, duloxetine, and physical therapy.  Overall doing quite well but having increased pain recently.     2.  Thoracolumbar pain likely multifactorial related to right-sided lumbar scoliosis, facet arthropathy, sacroiliac joint pain in setting of sagittal plane imbalance and flatback syndrome.   No improvement with physical therapy and gabapentin.  Pain remains consistent with right-sided facet arthropathy.  No specific radicular pain.  No improvement with medial branch blocks.   Some improvement following right L4-5 TFESI done in August, December 2023 and January 10, 2024.  Pain has been fairly well-tolerated with Cymbalta and Lyrica however increased pain recently in the right thoracolumbar region consistent with myofascial pain.     3.  Worsening bilateral lower extremity paresthesias consistent with peripheral polyneuropathy.  No benefit with gabapentin.  This is persistent but tolerable.  No improvement with gabapentin.  Having significant issues at night despite Lyrica and Cymbalta.         Discussion:    1.  We discussed her current treatment plan.  She has had a few episodes of severe sharp shooting found to the thoracolumbar region and lumbar spine along with increasing paresthesias bothersome neuropathy symptoms at night.  We discussed medication changes further injections.  She is interested in increasing medication.    2.  Increase Lyrica to 25 mg in the morning and afternoon and 50 mg at night.  New prescription is provided.    3.  Continue Cymbalta 30 mg at bedtime tolerating well.      4.  Continue home exercises from PT and continue to walk regularly on the treadmill.    5.  Follow-up with me in 6 months and she will call based on her response to increase in the Lyrica.      It was our pleasure caring for your patient today, if there any questions or concerns please do not hesitate to contact us.      Subjective:   Patient  ID: Mily Hilliard is a 74 year old female.    History of Present Illness: Patient presents with her  today for follow-up of right-sided lumbar spine pain up to the thoracolumbar region.  Previously was having some right lower extremity nuclear pain that has been doing quite well.  She also has neuropathy and numbness and tingling in her feet and lower legs.  Back pain is still worse with standing or sitting for too long difficult time in the car but overall doing well walking a mile 3 times a week and really been tolerating things with medications although she has had a few episodes of severe sharp shooting stabbing pain in the lumbar spine up to the thoracic spine just on the right the past few weeks nothing down the leg.  Pain is a 9/10 at worst 4/10 today and at best.  No drowsiness or side effects from Cymbalta 30 mg daily and Lyrica 25 mg 3 times daily.  Also having worse pain at night in her feet with paresthesias quite bothersome.  She still has to take Tylenol regularly and does take occasional ibuprofen 600 mg at a time up to twice a day but trying to avoid ibuprofen if she is able.  Has been working with physical therapist on positioning in the car with putting a pillow underneath her and behind her.      Imaging: Lumbar MRI images shows multilevel degenerative changes with disc height loss mild facet arthropathy L4-5 severe right no left foraminal stenosis degenerative disc changes minimal broad-based disc bulge moderate right and mild left foraminal stenosis L5-S1.  Grade 1 retrolisthesis L2 and L3 L3 and L4 L4 and L5.  Lumbar scoliosis.    Review of Systems: Complains of weakness in her back fevers denies paresthesias bowel or bladder incontinence headaches unintentional weight loss or falls.           Current Outpatient Medications   Medication Sig Dispense Refill    alendronate (FOSAMAX) 70 MG tablet Take 1 tablet (70 mg) by mouth once every 7 days 12 tablet 0    amLODIPine (NORVASC) 5 MG  tablet Take 1 tablet (5 mg) by mouth daily 90 tablet 3    aspirin (ASA) 81 MG chewable tablet Take 81 mg by mouth daily      atorvastatin (LIPITOR) 20 MG tablet Take 1 tablet (20 mg) by mouth once daily 90 tablet 3    calcium acetate (PHOSLO) 667 MG CAPS capsule Take 667 mg by mouth daily      DULoxetine (CYMBALTA) 30 MG capsule Take 1 capsule (30 mg) by mouth daily. 90 capsule 1    furosemide (LASIX) 20 MG tablet Take 1 tablet (20 mg) by mouth daily. 7 tablet 0    levothyroxine (SYNTHROID/LEVOTHROID) 75 MCG tablet TAKE ONE TABLET BY MOUTH ONE TIME DAILY 90 tablet 3    lisinopril (ZESTRIL) 40 MG tablet Take 1 tablet (40 mg) by mouth daily 90 tablet 3    Multiple Vitamins-Minerals (MULTIVITAMIN PO)       pregabalin (LYRICA) 25 MG capsule Take 1 capsule (25 mg) by mouth 3 times daily 90 capsule 2    primidone (MYSOLINE) 250 MG tablet Take 1 tablet (250 mg) by mouth at bedtime 90 tablet 3    Vitamin D, Cholecalciferol, 25 MCG (1000 UT) TABS        Current Facility-Administered Medications   Medication Dose Route Frequency Provider Last Rate Last Admin    1.0 mL bupivacaine (MARCAINE) 0.5% injection (50 mL vial)  1 mL   Phong Almanzar MD   1 mL at 01/11/23 0959    2.0 mL bupivacaine (MARCAINE) 0.5% injection (50 mL vial)  2 mL   Phong Almanzar MD   2 mL at 03/06/23 1006    lidocaine 1 % injection 2 mL  2 mL   Phong Almanzar MD   2 mL at 03/06/23 1006    lidocaine 1 % injection 2 mL  2 mL   Phong Almanzar MD   2 mL at 01/11/23 0959    triamcinolone (KENALOG-40) injection 40 mg  40 mg   Phong Almanzar MD   40 mg at 03/06/23 1006    triamcinolone (KENALOG-40) injection 40 mg  40 mg   Phong Almanzar MD   40 mg at 01/11/23 0959       Past Medical History:   Diagnosis Date    Arthritis     Breast cancer (H) 2000    right breast- lumpectomy, lymph 15/19 pos - radiation and chemo    Hypercholesterolemia     Hypertension     Hypothyroidism        The following portions of the patient's history were reviewed and updated as  "appropriate: allergies, current medications, past family history, past medical history, past social history, past surgical history and problem list.           Objective:   Physical Exam:    BP (!) 157/65   Pulse 59   Ht 5' 5\" (1.651 m)   Wt 137 lb (62.1 kg)   BMI 22.80 kg/m    There is no height or weight on file to calculate BMI.      General: Alert and oriented with normal affect. Attention, knowledge, memory, and language are intact. No acute distress.   Eyes: Sclerae are clear.  Respirations: Unlabored. CV: No lower extremity edema.    Gait:  Nonantalgic, flattened lumbar lordotic curve/thoracolumbar junction.  Thoracic scoliosis.  No tenderness over the spinous process of the thoracic spine or lumbar spine.  Hypertonic tissue textures right lumbar paraspinals throughout the mid lumbar spine.  Sensation is intact to light touch throughout the  lower extremities.       Manual muscle testing reveals:  Right /Left out of 5     5/5 knee flexors  5/5 knee extensors  5/5 ankle plantar flexors  5/5 ankle dorsiflexors  5/5   ankle evertors  "

## 2025-01-27 ENCOUNTER — OFFICE VISIT (OUTPATIENT)
Dept: PHYSICAL MEDICINE AND REHAB | Facility: CLINIC | Age: 75
End: 2025-01-27
Payer: COMMERCIAL

## 2025-01-27 VITALS
HEART RATE: 59 BPM | HEIGHT: 65 IN | DIASTOLIC BLOOD PRESSURE: 65 MMHG | WEIGHT: 137 LBS | BODY MASS INDEX: 22.82 KG/M2 | SYSTOLIC BLOOD PRESSURE: 157 MMHG

## 2025-01-27 DIAGNOSIS — M54.50 LUMBAR SPINE PAIN: Primary | ICD-10-CM

## 2025-01-27 DIAGNOSIS — M43.8X9 SAGITTAL PLANE IMBALANCE: ICD-10-CM

## 2025-01-27 DIAGNOSIS — M41.25 OTHER IDIOPATHIC SCOLIOSIS, THORACOLUMBAR REGION: ICD-10-CM

## 2025-01-27 DIAGNOSIS — M54.16 LUMBAR RADICULAR PAIN: ICD-10-CM

## 2025-01-27 DIAGNOSIS — M48.061 FORAMINAL STENOSIS OF LUMBAR REGION: ICD-10-CM

## 2025-01-27 DIAGNOSIS — G62.9 POLYNEUROPATHY: ICD-10-CM

## 2025-01-27 DIAGNOSIS — M54.6 PAIN IN THORACIC SPINE: ICD-10-CM

## 2025-01-27 DIAGNOSIS — M47.816 LUMBAR FACET ARTHROPATHY: ICD-10-CM

## 2025-01-27 DIAGNOSIS — M79.18 MYOFASCIAL PAIN: ICD-10-CM

## 2025-01-27 PROCEDURE — 99214 OFFICE O/P EST MOD 30 MIN: CPT | Performed by: PHYSICAL MEDICINE & REHABILITATION

## 2025-01-27 RX ORDER — PREGABALIN 25 MG/1
CAPSULE ORAL
Qty: 12 CAPSULE | Refills: 0 | Status: SHIPPED | OUTPATIENT
Start: 2025-01-27 | End: 2025-01-27

## 2025-01-27 RX ORDER — DULOXETIN HYDROCHLORIDE 30 MG/1
30 CAPSULE, DELAYED RELEASE ORAL DAILY
Qty: 90 CAPSULE | Refills: 1 | Status: SHIPPED | OUTPATIENT
Start: 2025-01-27

## 2025-01-27 ASSESSMENT — PAIN SCALES - GENERAL: PAINLEVEL_OUTOF10: MODERATE PAIN (5)

## 2025-01-27 NOTE — LETTER
1/27/2025      Mily Hilliard  [address]  [place] [address]      Dear Colleague,    Thank you for referring your patient, Mily Hilliard, to the [facility]. Please see a copy of my visit note below.    Assessment/Plan:      Mily was seen today for back pain.    Diagnoses and all orders for this visit:    Lumbar spine pain    Foraminal stenosis of lumbar region    Lumbar facet arthropathy    Other idiopathic scoliosis, thoracolumbar region    Sagittal plane imbalance    Pain in thoracic spine    Myofascial pain    Polyneuropathy  -     pregabalin (LYRICA) 25 MG capsule; Take 25 mg in the morning, 25 mg the afternoon and 50 mg at bedtime  -     DULoxetine (CYMBALTA) 30 MG capsule; Take 1 capsule (30 mg) by mouth daily.    Lumbar radicular pain  -     pregabalin (LYRICA) 25 MG capsule; Take 25 mg in the morning, 25 mg the afternoon and 50 mg at bedtime  -     DULoxetine (CYMBALTA) 30 MG capsule; Take 1 capsule (30 mg) by mouth daily.         Assessment: Pleasant 74 year old female  with a history of Hypothyroidism,with a history of hypertension, with a history of hypertension, hypothyroidism, breast cancer treated with chemotherapy and subsequent polyneuropathy, hyperlipidemia, osteoporosis with:     1.  Recent increase in right-sided lumbar spine pain shooting to the thoracolumbar region.  History of right lumbar spine and right lower extremity proximal radicular pain in an L4 distribution.  She has severe right L4-5 foraminal stenosis related to degenerative disc disease and facet arthropathy in the setting of lumbar scoliosis.  Right lumbar radicular pain has been managed with a right L4-5 TFESI x 2 over the past year.  Failed medial branch blocks in 2023.  She also has had physical therapy in the past.  EMG has been negative for radiculopathy.  Failed gabapentin.  Taking Tylenol at bedtime.  Best benefit is a right L4-5 TFESI most recently done January 10, 2024.  Not  a great surgical candidate per Dr. Vigil due to complexity of the surgery.  Has had some improvement with pregabalin 25 mg 3 times daily tolerating this well.  Duloxetine 30 mg daily tolerated well.  Has had improvement with walking 3 times a week 1 mile on the treadmill.  Radicular pain has been well-controlled with pregabalin, duloxetine, and physical therapy.  Overall doing quite well but having increased pain recently.     2.  Thoracolumbar pain likely multifactorial related to right-sided lumbar scoliosis, facet arthropathy, sacroiliac joint pain in setting of sagittal plane imbalance and flatback syndrome.   No improvement with physical therapy and gabapentin.  Pain remains consistent with right-sided facet arthropathy.  No specific radicular pain.  No improvement with medial branch blocks.   Some improvement following right L4-5 TFESI done in August, December 2023 and January 10, 2024.  Pain has been fairly well-tolerated with Cymbalta and Lyrica however increased pain recently in the right thoracolumbar region consistent with myofascial pain.     3.  Worsening bilateral lower extremity paresthesias consistent with peripheral polyneuropathy.  No benefit with gabapentin.  This is persistent but tolerable.  No improvement with gabapentin.  Having significant issues at night despite Lyrica and Cymbalta.         Discussion:    1.  We discussed her current treatment plan.  She has had a few episodes of severe sharp shooting found to the thoracolumbar region and lumbar spine along with increasing paresthesias bothersome neuropathy symptoms at night.  We discussed medication changes further injections.  She is interested in increasing medication.    2.  Increase Lyrica to 25 mg in the morning and afternoon and 50 mg at night.  New prescription is provided.    3.  Continue Cymbalta 30 mg at bedtime tolerating well.      4.  Continue home exercises from PT and continue to walk regularly on the treadmill.    5.   Follow-up with me in 6 months and she will call based on her response to increase in the Lyrica.      It was our pleasure caring for your patient today, if there any questions or concerns please do not hesitate to contact us.      Subjective:   Patient ID: Mily Hilliard is a 74 year old female.    History of Present Illness: Patient presents with her  today for follow-up of right-sided lumbar spine pain up to the thoracolumbar region.  Previously was having some right lower extremity nuclear pain that has been doing quite well.  She also has neuropathy and numbness and tingling in her feet and lower legs.  Back pain is still worse with standing or sitting for too long difficult time in the car but overall doing well walking a mile 3 times a week and really been tolerating things with medications although she has had a few episodes of severe sharp shooting stabbing pain in the lumbar spine up to the thoracic spine just on the right the past few weeks nothing down the leg.  Pain is a 9/10 at worst 4/10 today and at best.  No drowsiness or side effects from Cymbalta 30 mg daily and Lyrica 25 mg 3 times daily.  Also having worse pain at night in her feet with paresthesias quite bothersome.  She still has to take Tylenol regularly and does take occasional ibuprofen 600 mg at a time up to twice a day but trying to avoid ibuprofen if she is able.  Has been working with physical therapist on positioning in the car with putting a pillow underneath her and behind her.      Imaging: Lumbar MRI images shows multilevel degenerative changes with disc height loss mild facet arthropathy L4-5 severe right no left foraminal stenosis degenerative disc changes minimal broad-based disc bulge moderate right and mild left foraminal stenosis L5-S1.  Grade 1 retrolisthesis L2 and L3 L3 and L4 L4 and L5.  Lumbar scoliosis.    Review of Systems: Complains of weakness in her back fevers denies paresthesias bowel or bladder  incontinence headaches unintentional weight loss or falls.           Current Outpatient Medications   Medication Sig Dispense Refill     alendronate (FOSAMAX) 70 MG tablet Take 1 tablet (70 mg) by mouth once every 7 days 12 tablet 0     amLODIPine (NORVASC) 5 MG tablet Take 1 tablet (5 mg) by mouth daily 90 tablet 3     aspirin (ASA) 81 MG chewable tablet Take 81 mg by mouth daily       atorvastatin (LIPITOR) 20 MG tablet Take 1 tablet (20 mg) by mouth once daily 90 tablet 3     calcium acetate (PHOSLO) 667 MG CAPS capsule Take 667 mg by mouth daily       DULoxetine (CYMBALTA) 30 MG capsule Take 1 capsule (30 mg) by mouth daily. 90 capsule 1     furosemide (LASIX) 20 MG tablet Take 1 tablet (20 mg) by mouth daily. 7 tablet 0     levothyroxine (SYNTHROID/LEVOTHROID) 75 MCG tablet TAKE ONE TABLET BY MOUTH ONE TIME DAILY 90 tablet 3     lisinopril (ZESTRIL) 40 MG tablet Take 1 tablet (40 mg) by mouth daily 90 tablet 3     Multiple Vitamins-Minerals (MULTIVITAMIN PO)        pregabalin (LYRICA) 25 MG capsule Take 1 capsule (25 mg) by mouth 3 times daily 90 capsule 2     primidone (MYSOLINE) 250 MG tablet Take 1 tablet (250 mg) by mouth at bedtime 90 tablet 3     Vitamin D, Cholecalciferol, 25 MCG (1000 UT) TABS        Current Facility-Administered Medications   Medication Dose Route Frequency Provider Last Rate Last Admin     1.0 mL bupivacaine (MARCAINE) 0.5% injection (50 mL vial)  1 mL   Phong Almanzar MD   1 mL at 01/11/23 0959     2.0 mL bupivacaine (MARCAINE) 0.5% injection (50 mL vial)  2 mL   Phong Almanzar MD   2 mL at 03/06/23 1006     lidocaine 1 % injection 2 mL  2 mL   Phong Almanzar MD   2 mL at 03/06/23 1006     lidocaine 1 % injection 2 mL  2 mL   Phong Almanzar MD   2 mL at 01/11/23 0959     triamcinolone (KENALOG-40) injection 40 mg  40 mg   Phong Almanzar MD   40 mg at 03/06/23 1006     triamcinolone (KENALOG-40) injection 40 mg  40 mg   Phong Almanzar MD   40 mg at 01/11/23 0959       Past Medical  "History:   Diagnosis Date     Arthritis      Breast cancer (H) 2000    right breast- lumpectomy, lymph 15/19 pos - radiation and chemo     Hypercholesterolemia      Hypertension      Hypothyroidism        The following portions of the patient's history were reviewed and updated as appropriate: allergies, current medications, past family history, past medical history, past social history, past surgical history and problem list.           Objective:   Physical Exam:    BP (!) 157/65   Pulse 59   Ht 5' 5\" (1.651 m)   Wt 137 lb (62.1 kg)   BMI 22.80 kg/m    There is no height or weight on file to calculate BMI.      General: Alert and oriented with normal affect. Attention, knowledge, memory, and language are intact. No acute distress.   Eyes: Sclerae are clear.  Respirations: Unlabored. CV: No lower extremity edema.    Gait:  Nonantalgic, flattened lumbar lordotic curve/thoracolumbar junction.  Thoracic scoliosis.  No tenderness over the spinous process of the thoracic spine or lumbar spine.  Hypertonic tissue textures right lumbar paraspinals throughout the mid lumbar spine.  Sensation is intact to light touch throughout the  lower extremities.       Manual muscle testing reveals:  Right /Left out of 5     5/5 knee flexors  5/5 knee extensors  5/5 ankle plantar flexors  5/5 ankle dorsiflexors  5/5   ankle evertors      Again, thank you for allowing me to participate in the care of your patient.        Sincerely,        Serafin Mccain DO    Electronically signed"

## 2025-01-27 NOTE — PATIENT INSTRUCTIONS
Continue with Duloxetine 30 mg daily.  Increase Pregabalin (Lyrica) to 25mg in the morning, 25mg in the afternoon, and 50mg at bedtime.  3. Continue with your home exercises from Physical therapy.

## 2025-02-01 DIAGNOSIS — I10 ESSENTIAL HYPERTENSION: ICD-10-CM

## 2025-02-03 RX ORDER — AMLODIPINE BESYLATE 5 MG/1
5 TABLET ORAL DAILY
Qty: 90 TABLET | Refills: 0 | Status: SHIPPED | OUTPATIENT
Start: 2025-02-03

## 2025-02-08 SDOH — HEALTH STABILITY: PHYSICAL HEALTH: ON AVERAGE, HOW MANY DAYS PER WEEK DO YOU ENGAGE IN MODERATE TO STRENUOUS EXERCISE (LIKE A BRISK WALK)?: 4 DAYS

## 2025-02-08 SDOH — HEALTH STABILITY: PHYSICAL HEALTH: ON AVERAGE, HOW MANY MINUTES DO YOU ENGAGE IN EXERCISE AT THIS LEVEL?: 20 MIN

## 2025-02-08 ASSESSMENT — SOCIAL DETERMINANTS OF HEALTH (SDOH): HOW OFTEN DO YOU GET TOGETHER WITH FRIENDS OR RELATIVES?: TWICE A WEEK

## 2025-02-12 ENCOUNTER — TELEPHONE (OUTPATIENT)
Dept: PEDIATRICS | Facility: CLINIC | Age: 75
End: 2025-02-12
Payer: COMMERCIAL

## 2025-02-12 NOTE — TELEPHONE ENCOUNTER
MARYANN Recruitment: [facility] insurance     Referral outreach attempt #1 on February 12, 2025      Outcome: left voicemail- Call back number [phone] and [employer] message sent    Jahaira Whitmore CMA  [place]

## 2025-02-13 ENCOUNTER — OFFICE VISIT (OUTPATIENT)
Dept: PEDIATRICS | Facility: CLINIC | Age: 75
End: 2025-02-13
Payer: COMMERCIAL

## 2025-02-13 VITALS
HEIGHT: 65 IN | RESPIRATION RATE: 16 BRPM | SYSTOLIC BLOOD PRESSURE: 107 MMHG | HEART RATE: 56 BPM | TEMPERATURE: 97.6 F | DIASTOLIC BLOOD PRESSURE: 62 MMHG | OXYGEN SATURATION: 100 % | BODY MASS INDEX: 22.64 KG/M2 | WEIGHT: 135.9 LBS

## 2025-02-13 DIAGNOSIS — I10 ESSENTIAL HYPERTENSION: ICD-10-CM

## 2025-02-13 DIAGNOSIS — M81.0 AGE-RELATED OSTEOPOROSIS WITHOUT CURRENT PATHOLOGICAL FRACTURE: ICD-10-CM

## 2025-02-13 DIAGNOSIS — G25.0 ESSENTIAL TREMOR: ICD-10-CM

## 2025-02-13 DIAGNOSIS — E78.00 HYPERCHOLESTEROLEMIA: ICD-10-CM

## 2025-02-13 DIAGNOSIS — Z78.0 ASYMPTOMATIC MENOPAUSAL STATE: ICD-10-CM

## 2025-02-13 DIAGNOSIS — N30.00 ACUTE CYSTITIS WITHOUT HEMATURIA: ICD-10-CM

## 2025-02-13 DIAGNOSIS — R39.15 URINARY URGENCY: ICD-10-CM

## 2025-02-13 DIAGNOSIS — Z85.3 PERSONAL HISTORY OF MALIGNANT NEOPLASM OF BREAST: ICD-10-CM

## 2025-02-13 DIAGNOSIS — E06.3 HYPOTHYROIDISM DUE TO HASHIMOTO'S THYROIDITIS: ICD-10-CM

## 2025-02-13 DIAGNOSIS — E78.00 PURE HYPERCHOLESTEROLEMIA: ICD-10-CM

## 2025-02-13 DIAGNOSIS — Z00.00 ROUTINE GENERAL MEDICAL EXAMINATION AT A HEALTH CARE FACILITY: Primary | ICD-10-CM

## 2025-02-13 LAB
ALBUMIN UR-MCNC: 100 MG/DL
APPEARANCE UR: ABNORMAL
BACTERIA #/AREA URNS HPF: ABNORMAL /HPF
BILIRUB UR QL STRIP: NEGATIVE
COLOR UR AUTO: YELLOW
GLUCOSE UR STRIP-MCNC: NEGATIVE MG/DL
HGB UR QL STRIP: ABNORMAL
KETONES UR STRIP-MCNC: NEGATIVE MG/DL
LEUKOCYTE ESTERASE UR QL STRIP: ABNORMAL
NITRATE UR QL: POSITIVE
PH UR STRIP: 6 [PH] (ref 5–7)
RBC #/AREA URNS AUTO: ABNORMAL /HPF
SP GR UR STRIP: 1.02 (ref 1–1.03)
SQUAMOUS #/AREA URNS AUTO: ABNORMAL /LPF
UROBILINOGEN UR STRIP-ACNC: 0.2 E.U./DL
WBC #/AREA URNS AUTO: >100 /HPF
WBC CLUMPS #/AREA URNS HPF: PRESENT /HPF

## 2025-02-13 RX ORDER — AMLODIPINE BESYLATE 5 MG/1
5 TABLET ORAL DAILY
Qty: 90 TABLET | Refills: 3 | Status: SHIPPED | OUTPATIENT
Start: 2025-02-13

## 2025-02-13 RX ORDER — PRIMIDONE 250 MG/1
250 TABLET ORAL AT BEDTIME
Qty: 90 TABLET | Refills: 3 | Status: SHIPPED | OUTPATIENT
Start: 2025-02-13

## 2025-02-13 RX ORDER — LEVOTHYROXINE SODIUM 75 UG/1
TABLET ORAL
Qty: 90 TABLET | Refills: 3 | Status: SHIPPED | OUTPATIENT
Start: 2025-02-13

## 2025-02-13 RX ORDER — ESTRADIOL 0.1 MG/G
2 CREAM VAGINAL
Qty: 42.5 G | Refills: 3 | Status: SHIPPED | OUTPATIENT
Start: 2025-02-13

## 2025-02-13 RX ORDER — ALENDRONATE SODIUM 70 MG/1
70 TABLET ORAL
Qty: 12 TABLET | Refills: 3 | Status: SHIPPED | OUTPATIENT
Start: 2025-02-13

## 2025-02-13 RX ORDER — NITROFURANTOIN 25; 75 MG/1; MG/1
100 CAPSULE ORAL 2 TIMES DAILY
Qty: 10 CAPSULE | Refills: 0 | Status: SHIPPED | OUTPATIENT
Start: 2025-02-13

## 2025-02-13 RX ORDER — ATORVASTATIN CALCIUM 20 MG/1
TABLET, FILM COATED ORAL
Qty: 90 TABLET | Refills: 3 | Status: SHIPPED | OUTPATIENT
Start: 2025-02-13

## 2025-02-13 RX ORDER — LISINOPRIL 40 MG/1
40 TABLET ORAL DAILY
Qty: 90 TABLET | Refills: 3 | Status: SHIPPED | OUTPATIENT
Start: 2025-02-13

## 2025-02-13 NOTE — PATIENT INSTRUCTIONS
Think about monitoring your blood pressure at home. Second reading today was on the lower side but you have been high in the past too.     Schedule bone density test.     Medications refilled.     Ask your pharmacist for a Tdap.     Will recheck urine today. Try using vaginal estrogen - start with 3 times weekly for 1 month then twice weekly. Apply in vagina and use fingertip to apply a small amount to area around urethra.     Think about a hearing exam.

## 2025-02-13 NOTE — PROGRESS NOTES
Preventive Care Visit  [facility] LEON Collado MD, Internal Medicine - Pediatrics  Feb 13, 2025      Assessment & Plan     Routine general medical examination at a health care facility  Counseling as below. Due for Tdap - will get at pharmacy.     Hypercholesterolemia  Tolerating medications well without issues; refilled today.   - Lipid panel reflex to direct LDL Non-fasting  - Comprehensive metabolic panel (BMP + Alb, Alk Phos, ALT, AST, Total. Bili, TP)  - atorvastatin (LIPITOR) 20 MG tablet; Take 1 tablet (20 mg) by mouth once daily    Essential hypertension  BP has been somewhat labile - low today on second check but high on first check. Will refill current medications, check labs and have her start to monitor BPs at home.   - lisinopril (ZESTRIL) 40 MG tablet; Take 1 tablet (40 mg) by mouth daily.  - amLODIPine (NORVASC) 5 MG tablet; Take 1 tablet (5 mg) by mouth daily.    Hypothyroidism due to Hashimoto's thyroiditis  Due for labs, med refill.   - levothyroxine (SYNTHROID/LEVOTHROID) 75 MCG tablet; TAKE ONE TABLET BY MOUTH ONE TIME DAILY  - TSH WITH FREE T4 REFLEX    Essential tremor  Still with tremor but on maximal dosing of primidone. Continue with current therapies.   - primidone (MYSOLINE) 250 MG tablet; Take 1 tablet (250 mg) by mouth at bedtime.    Age-related osteoporosis without current pathological fracture with routine healing  Tolerating medication well without issues, due for DEXA.   - DX Bone Density; Future  - alendronate (FOSAMAX) 70 MG tablet; Take 1 tablet (70 mg) by mouth every 7 days. Profile Rx: patient will contact pharmacy when needed  - Vitamin D Deficiency; Future  - Vitamin D Deficiency    Urinary urgency  Will repeat UA today for ongoing UTI symptoms. Given recurrent UTIs will start vaginal estrogens to see if this helps symptoms.   - UA Macroscopic with reflex to Microscopic and Culture - Clinic Collect  - estradiol (ESTRACE) 0.1 MG/GM vaginal cream; Place 2  g vaginally twice a week.    Asymptomatic menopausal state  - DX Bone Density; Future      Counseling  Appropriate preventive services were addressed with this patient via screening, questionnaire, or discussion as appropriate for fall prevention, nutrition, physical activity, Tobacco-use cessation, social engagement, weight loss and cognition.  Checklist reviewing preventive services available has been given to the patient.  Reviewed patient's diet, addressing concerns and/or questions.   Discussed possible causes of fatigue. Updated plan of care.  Patient reported difficulty with activities of daily living were addressed today.The patient was provided with written information regarding signs of hearing loss.   Information on urinary incontinence and treatment options given to patient.       Patient Instructions   Think about monitoring your blood pressure at home. Second reading today was on the lower side but you have been high in the past too.     Schedule bone density test.     Medications refilled.     Ask your pharmacist for a Tdap.     Will recheck urine today. Try using vaginal estrogen - start with 3 times weekly for 1 month then twice weekly. Apply in vagina and use fingertip to apply a small amount to area around urethra.     Think about a hearing exam.    Daniel Minor is a 74 year old, presenting for the following:  Wellness Visit        2/13/2025    10:19 AM   Additional Questions   Roomed by Serena Chavez   Accompanied by N/A         HPI    Needs medication refills.    Continues to have some urinary urgency, has a hx of recurrent UTIs.    Does still have tremors, somewhat managed with primidone.    No side effects with medications, is on Lyrica and duloxetine through spine clinic for ongoing issues with pain and this, along with regular activity, seems to help symptoms.     Health Care Directive  Patient has a Health Care Directive on file  Advance care planning document is on file and is  current.      2/8/2025   General Health   How would you rate your overall physical health? Good   Feel stress (tense, anxious, or unable to sleep) Not at all         2/8/2025   Nutrition   Diet: Regular (no restrictions)         2/8/2025   Exercise   Days per week of moderate/strenous exercise 4 days   Average minutes spent exercising at this level 20 min         2/8/2025   Social Factors   Frequency of gathering with friends or relatives Twice a week   Worry food won't last until get money to buy more No   Food not last or not have enough money for food? No   Do you have housing? (Housing is defined as stable permanent housing and does not include staying ouside in a car, in a tent, in an abandoned building, in an overnight shelter, or couch-surfing.) Yes   Are you worried about losing your housing? No   Lack of transportation? No   Unable to get utilities (heat,electricity)? No         2/13/2025   Fall Risk   Gait Speed Test (Document in seconds) 4.2   Gait Speed Test Interpretation Less than or equal to 5.00 seconds - PASS          2/8/2025   Activities of Daily Living- Home Safety   Needs help with the following daily activites Housework   Safety concerns in the home None of the above         2/8/2025   Dental   Dentist two times every year? Yes         2/8/2025   Hearing Screening   Hearing concerns? (!) I FEEL THAT PEOPLE ARE MUMBLING OR NOT SPEAKING CLEARLY.    (!) IT'S HARD TO FOLLOW A CONVERSATION IN A NOISY RESTAURANT OR CROWDED ROOM.       Multiple values from one day are sorted in reverse-chronological order         2/8/2025   Driving Risk Screening   Patient/family members have concerns about driving No         2/8/2025   General Alertness/Fatigue Screening   Have you been more tired than usual lately? (!) YES         2/8/2025   Urinary Incontinence Screening   Bothered by leaking urine in past 6 months Yes         2/9/2024   TB Screening   Were you born outside of the [place]? No         Today's PHQ-2  Score:       2025    10:14 AM   PHQ-2 (  [employer])   Q1: Little interest or pleasure in doing things 0   Q2: Feeling down, depressed or hopeless 0   PHQ-2 Score 0    Q1: Little interest or pleasure in doing things Not at all   Q2: Feeling down, depressed or hopeless Not at all   PHQ-2 Score 0       Patient-reported           2025   Substance Use   Alcohol more than 3/day or more than 7/wk No   Do you have a current opioid prescription? No   How severe/bad is pain from 1 to 10? 4/10   Do you use any other substances recreationally? No     Social History     Tobacco Use    Smoking status: Former     Current packs/day: 0.00     Types: Cigarettes     Quit date: 2003     Years since quittin.1     Passive exposure: Never    Smokeless tobacco: Never   Vaping Use    Vaping status: Never Used   Substance Use Topics    Alcohol use: Not Currently     Alcohol/week: 11.7 standard drinks of alcohol     Types: 12 Standard drinks or equivalent per week    Drug use: No           2024   LAST FHS-7 RESULTS   1st degree relative breast or ovarian cancer Yes   Any relative bilateral breast cancer No   Any male have breast cancer No   Any ONE woman have BOTH breast AND ovarian cancer No   Any woman with breast cancer before 50yrs No   2 or more relatives with breast AND/OR ovarian cancer No   2 or more relatives with breast AND/OR bowel cancer Yes        Mammogram Screening - After age 74- determine frequency with patient based on health status, life expectancy and patient goals    ASCVD Risk   The 10-year ASCVD risk score (Riley CAVAZOS, et al., 2019) is: 14.8%    Values used to calculate the score:      Age: 74 years      Sex: Female      Is Non- : No      Diabetic: No      Tobacco smoker: No      Systolic Blood Pressure: 107 mmHg      Is BP treated: Yes      HDL Cholesterol: 40 mg/dL      Total Cholesterol: 159 mg/dL            Reviewed and updated as needed this visit by  Provider                    Patient Active Problem List   Diagnosis    Hypothyroidism    Hypertension    Hypercholesterolemia    Essential tremor    Age-related osteoporosis with current pathological fracture with routine healing     Past Surgical History:   Procedure Laterality Date    Bilateral Foot surgery      COLONOSCOPY  2014    Dr. Naylor [facility]    COLONOSCOPY N/A 2019    Procedure: COLONOSCOPY;  Surgeon: Catrachito Naylor MD;  Location:  GI    COLONOSCOPY N/A 2024    Procedure: Colonoscopy;  Surgeon: Catrachito Naylor MD;  Location:  GI    EYE SURGERY      cataract removal, macular hole repair    LUMPECTOMY BREAST  2000    Right       Social History     Tobacco Use    Smoking status: Former     Current packs/day: 0.00     Types: Cigarettes     Quit date: 2003     Years since quittin.1     Passive exposure: Never    Smokeless tobacco: Never   Substance Use Topics    Alcohol use: Not Currently     Alcohol/week: 11.7 standard drinks of alcohol     Types: 12 Standard drinks or equivalent per week     Family History   Problem Relation Age of Onset    Colon Cancer Mother     Cancer - colorectal Mother         at age 94    Breast Cancer Mother     Macular Degeneration Father     Bladder Cancer Father     Stomach Cancer Maternal Grandfather     No Known Problems Brother     No Known Problems Brother     Diabetes Sister         pre-diabetic    Diabetes Sister     Breast Cancer Sister     Anxiety Disorder Son          Current providers sharing in care for this patient include:  Patient Care Team:  Meri Pacheco MD as PCP - General (Internal Medicine)  Serafin Mccain DO as Assigned Neuroscience Provider  Phong Mckeon DPM as Assigned Surgical Provider  Deidre Vigil MD as MD (Neurological Surgery)  Priscila Nichole PA-C as Assigned PCP    The following health maintenance items are reviewed in Epic and correct as of today:  Health Maintenance   Topic Date Due     "ANNUAL REVIEW OF HM ORDERS  Never done    DTAP/TDAP/TD IMMUNIZATION (4 - Td or Tdap) 04/14/2023    Medicare Annual MTM Pharmacist Visit (once per calendar year)  Never done    MEDICARE ANNUAL WELLNESS VISIT  02/13/2025    LIPID  02/13/2025    TSH W/FREE T4 REFLEX  02/13/2025    COVID-19 Vaccine (10 - 2024-25 season) 03/26/2025    DEXA  06/20/2025    MAMMO SCREENING  06/24/2025    BMP  01/07/2026    FALL RISK ASSESSMENT  02/13/2026    GLUCOSE  01/07/2028    ADVANCE CARE PLANNING  07/10/2029    PHQ-2 (once per calendar year)  Completed    INFLUENZA VACCINE  Completed    Pneumococcal Vaccine: 50+ Years  Completed    ZOSTER IMMUNIZATION  Completed    RSV VACCINE  Completed    HPV IMMUNIZATION  Aged Out    MENINGITIS IMMUNIZATION  Aged Out    HEPATITIS C SCREENING  Discontinued    COLORECTAL CANCER SCREENING  Discontinued         Review of Systems  Constitutional, neuro, ENT, endocrine, pulmonary, cardiac, gastrointestinal, genitourinary, musculoskeletal, integument and psychiatric systems are negative, except as otherwise noted.     Objective    Exam  /62 (BP Location: Right arm, Patient Position: Sitting, Cuff Size: Adult Regular)   Pulse 56   Temp 97.6  F (36.4  C) (Temporal)   Resp 16   Ht 1.651 m (5' 5\")   Wt 61.6 kg (135 lb 14.4 oz)   LMP  (LMP Unknown)   SpO2 100%   BMI 22.61 kg/m     Estimated body mass index is 22.61 kg/m  as calculated from the following:    Height as of this encounter: 1.651 m (5' 5\").    Weight as of this encounter: 61.6 kg (135 lb 14.4 oz).    Physical Exam  GENERAL: alert and no distress  EYES: Eyes grossly normal to inspection, PERRL and conjunctivae and sclerae normal  HENT: ear canals and TM's normal, nose and mouth without ulcers or lesions  NECK: no adenopathy, no asymmetry, masses, or scars  RESP: lungs clear to auscultation - no rales, rhonchi or wheezes  CV: regular rate and rhythm, normal S1 S2, no S3 or S4, no murmur, click or rub, no peripheral edema  ABDOMEN: soft, " nontender, no hepatosplenomegaly, no masses and bowel sounds normal  MS: no gross musculoskeletal defects noted, no edema  SKIN: no suspicious lesions or rashes  NEURO: Normal strength and tone, mentation intact and speech normal  PSYCH: mentation appears normal, affect normal/bright         2/13/2025   Mini Cog   Clock Draw Score 2 Normal   3 Item Recall 3 objects recalled   Mini Cog Total Score 5            The longitudinal plan of care for the diagnosis(es)/condition(s) as documented were addressed during this visit. Due to the added complexity in care, I will continue to support Mily in the subsequent management and with ongoing continuity of care.    Signed Electronically by: Meri Pacheco MD    Addendum:  UA consistent with UTI - RN reviewed with patient, will start nitrofuraontoin while waiting culture results.    Meri Pacheco MD  Internal Medicine-Pediatrics

## 2025-02-17 ENCOUNTER — TELEPHONE (OUTPATIENT)
Dept: PEDIATRICS | Facility: CLINIC | Age: 75
End: 2025-02-17

## 2025-02-17 NOTE — TELEPHONE ENCOUNTER
Called pt. Pt says that her sx's are slowly getting better, denies any new or concerning sx's. Tolerating abx well.     Leaving town tomorrow for a week vacation(going to [place] to meet family). Advised to finish abx, push fluids & monitor closely.     No new questions or concerns at this time. Advised to call us with any concerns, pt agrees.     nitroFURantoin macrocrystal-monohydrate (MACROBID) 100 MG capsule 10 capsule 0 2/13/2025 -- No   Sig - Route: Take 1 capsule (100 mg) by mouth 2 times daily. - Oral     Hilario MEJIA  Clinic RN  [facility]

## 2025-03-22 ENCOUNTER — HEALTH MAINTENANCE LETTER (OUTPATIENT)
Age: 75
End: 2025-03-22

## 2025-04-08 DIAGNOSIS — M54.16 LUMBAR RADICULAR PAIN: ICD-10-CM

## 2025-04-08 DIAGNOSIS — G62.9 POLYNEUROPATHY: ICD-10-CM

## 2025-04-08 RX ORDER — PREGABALIN 25 MG/1
CAPSULE ORAL
Qty: 120 CAPSULE | Refills: 0 | Status: SHIPPED | OUTPATIENT
Start: 2025-04-08

## 2025-04-21 ENCOUNTER — NURSE TRIAGE (OUTPATIENT)
Dept: PEDIATRICS | Facility: CLINIC | Age: 75
End: 2025-04-21
Payer: COMMERCIAL

## 2025-04-21 NOTE — TELEPHONE ENCOUNTER
"Nurse Triage SBAR    Is this a 2nd Level Triage? NO    Situation: Patient sent [employer] message re: ear pain, RN contacted patient to triage symptoms further    Background: 74 year old female, complaints of intermittent right ear pain x1 week    Assessment: Patient reports intermittent sharp pain in right ear that happens 20-30x/day, lasts for short period of time, states it \"catches her off guard\". Rates pain 8/10 when present. Not present at time of call, reports  just put OTC ear drops in.     Denies fever, dizziness, headache, change in hearing, discharge, redness, swelling.    Protocol Recommended Disposition:   See in Office Today or Tomorrow    Recommendation: Per RN protocol, scheduled patient to be seen tomorrow 4/22 at 2:30 with a 2:10 arrival time (same day slot, ok'd by provider). Reviewed care advice under care tab. Advised patient to call back sooner with any new or worsening symptoms. Patient verbalized understanding and agreement in plan.    Deidre NEW RN    [facility]      Reason for Disposition   All other earaches (Exceptions: Brief ear pain lasting < 1 hour, and earache occurring during air travel.)    Additional Information   Negative: Sounds like a life-threatening emergency to the triager   Negative: Moving the earlobe or touching the ear clearly increases the pain   Negative: Foreign body stuck in the ear canal (e.g., bug, piece of cotton)   Negative: Pink or red swelling behind the ear   Negative: Stiff neck (can't touch chin to chest)   Negative: Patient sounds very sick or weak to the triager   Negative: SEVERE earache pain (e.g., excruciating)   Negative: Fever > 103 F (39.4 C)   Negative: Ear pain right after long - thin object was inserted into the ear canal (e.g., paper clip, pencil, Q-tip, wire)   Negative: White, yellow, or green discharge (pus)   Negative: Diabetes mellitus or a weak immune system (e.g., HIV positive, cancer chemotherapy, transplant patient)   Negative: " "Bloody discharge or unexplained bleeding from ear canal   Negative: New blurred vision or vision changes    Answer Assessment - Initial Assessment Questions  1. LOCATION: \"Which ear is involved?\"      right  2. ONSET: \"When did the ear pain start?\"       About a week ago  3. SEVERITY: \"How bad is the pain?\"  (Scale 1-10; mild, moderate or severe)      Shooting pain, 8/10, comes and goes with movement  4. URI SYMPTOMS: \"Do you have a runny nose or cough?\"      no  5. FEVER: \"Do you have a fever?\" If Yes, ask: \"What is your temperature, how was it measured, and when did it start?\"      no  6. CAUSE: \"Have you been swimming recently?\", \"How often do you use Q-TIPS?\", \"Have you had any recent air travel or scuba diving?\"      No swimming, occasional q-tip, no recent air travel  7. OTHER SYMPTOMS: \"Do you have any other symptoms?\" (e.g., decreased hearing, dizziness, headache, stiff neck, vomiting)      Hearing if fine, no dizziness, no headache, chronic stiff neck at baseline, no vomiting  8. PREGNANCY: \"Is there any chance you are pregnant?\" \"When was your last menstrual period?\"      N/a    Protocols used: Earache-A-[place]    "

## 2025-04-22 ENCOUNTER — OFFICE VISIT (OUTPATIENT)
Dept: PEDIATRICS | Facility: CLINIC | Age: 75
End: 2025-04-22
Payer: COMMERCIAL

## 2025-04-22 VITALS
DIASTOLIC BLOOD PRESSURE: 68 MMHG | SYSTOLIC BLOOD PRESSURE: 161 MMHG | OXYGEN SATURATION: 100 % | RESPIRATION RATE: 16 BRPM | WEIGHT: 140.9 LBS | TEMPERATURE: 96.8 F | HEIGHT: 65 IN | HEART RATE: 57 BPM | BODY MASS INDEX: 23.47 KG/M2

## 2025-04-22 DIAGNOSIS — H92.01 RIGHT EAR PAIN: Primary | ICD-10-CM

## 2025-04-22 PROCEDURE — 1125F AMNT PAIN NOTED PAIN PRSNT: CPT | Performed by: PHYSICIAN ASSISTANT

## 2025-04-22 PROCEDURE — 99213 OFFICE O/P EST LOW 20 MIN: CPT | Performed by: PHYSICIAN ASSISTANT

## 2025-04-22 PROCEDURE — 3078F DIAST BP <80 MM HG: CPT | Performed by: PHYSICIAN ASSISTANT

## 2025-04-22 PROCEDURE — 3077F SYST BP >= 140 MM HG: CPT | Performed by: PHYSICIAN ASSISTANT

## 2025-04-22 ASSESSMENT — PAIN SCALES - GENERAL: PAINLEVEL_OUTOF10: MODERATE PAIN (4)

## 2025-04-22 NOTE — PROGRESS NOTES
"  Assessment & Plan     Right ear pain  Resolved.    Subjective   Mily is a 74 year old, presenting for the following health issues:  Ear Problem      4/22/2025     2:19 PM   Additional Questions   Roomed by BB VF   Accompanied by Self         4/22/2025     2:19 PM   Patient Reported Additional Medications   Patient reports taking the following new medications None     Ear Problem    History of Present Illness       Reason for visit:  Earache  Symptom onset:  1-2 weeks ago   She is taking medications regularly.      Ear pain x 3 days. Improved today. No headache. No ST, nasal congestion. No vision changes.    Review of Systems  Constitutional, HEENT, cardiovascular, pulmonary, gi and gu systems are negative, except as otherwise noted.      Objective    BP (!) 161/68 (BP Location: Left arm, Patient Position: Sitting, Cuff Size: Adult Regular)   Pulse 57   Temp 96.8  F (36  C) (Temporal)   Resp 16   Ht 1.651 m (5' 5\")   Wt 63.9 kg (140 lb 14.4 oz)   LMP  (LMP Unknown)   SpO2 100%   BMI 23.45 kg/m    Body mass index is 23.45 kg/m .  Physical Exam   GENERAL: alert and no distress  EYES: Eyes grossly normal to inspection, PERRL and conjunctivae and sclerae normal  HENT: ear canals and TM's normal, nose and mouth without ulcers or lesions  NECK: no adenopathy  RESP: lungs clear to auscultation - no rales, rhonchi or wheezes  CV: regular rate and rhythm, normal S1 S2, no S3 or S4    No results found for any visits on 04/22/25.        Signed Electronically by: Kt Simeon PA-C    "

## 2025-04-29 ENCOUNTER — ANCILLARY PROCEDURE (OUTPATIENT)
Dept: BONE DENSITY | Facility: CLINIC | Age: 75
End: 2025-04-29
Attending: INTERNAL MEDICINE
Payer: COMMERCIAL

## 2025-04-29 PROCEDURE — 77080 DXA BONE DENSITY AXIAL: CPT | Mod: TC | Performed by: RADIOLOGY

## 2025-05-01 ENCOUNTER — VIRTUAL VISIT (OUTPATIENT)
Dept: PHARMACY | Facility: CLINIC | Age: 75
End: 2025-05-01
Payer: COMMERCIAL

## 2025-05-01 DIAGNOSIS — Z78.9 TAKES DIETARY SUPPLEMENTS: ICD-10-CM

## 2025-05-01 DIAGNOSIS — E78.00 HYPERCHOLESTEROLEMIA: ICD-10-CM

## 2025-05-01 DIAGNOSIS — G25.0 ESSENTIAL TREMOR: ICD-10-CM

## 2025-05-01 DIAGNOSIS — Z79.82 ASPIRIN LONG-TERM USE: ICD-10-CM

## 2025-05-01 DIAGNOSIS — R39.15 URINARY URGENCY: ICD-10-CM

## 2025-05-01 DIAGNOSIS — E06.3 HYPOTHYROIDISM DUE TO HASHIMOTO THYROIDITIS: ICD-10-CM

## 2025-05-01 DIAGNOSIS — M54.50 LUMBAR PAIN: ICD-10-CM

## 2025-05-01 DIAGNOSIS — M80.00XD AGE-RELATED OSTEOPOROSIS WITH CURRENT PATHOLOGICAL FRACTURE WITH ROUTINE HEALING: Primary | ICD-10-CM

## 2025-05-01 DIAGNOSIS — I10 PRIMARY HYPERTENSION: ICD-10-CM

## 2025-05-01 RX ORDER — MULTIVIT-MIN/IRON/FOLIC ACID/K 18-600-40
1 CAPSULE ORAL DAILY
COMMUNITY

## 2025-05-01 NOTE — LETTER
"Recommended To-Do List      Prepared on: May 1, 2025       You can get the best results from your medications by completing the items on this \"To-Do List.\"      Bring your To-Do List when you go to your doctor. And, share it with your family or caregivers.    My To-Do List:  What we talked about: What I should do:   Your medication dosage being too low    Increase how often you take CALCIUM 500 PO from all at once to 500 mg twice daily           What we talked about: What I should do:   An issue with your medication    Stop taking aspirin (ASA) due to no longer recommend after the age of 70.           What we talked about: What I should do:   The importance of taking your medication as intended    Education: you can try Lyrica twice daily (50 mg in the morning and continue 25 mg at bedtime)           What we talked about: What I should do:                     "

## 2025-05-01 NOTE — LETTER
May 1, 2025  Mily Hilliard  [address] [place] [address]    Dear Ms. Hilliard, ROBERTO [facility] LEON     Thank you for talking with me on May 1, 2025 about your health and medications. As a follow-up to our conversation, I have included two documents:      Your Recommended To-Do List has steps you should take to get the best results from your medications.  Your Medication List will help you keep track of your medications and how to take them.    If you want to talk about these documents, please call Richa Nobles RPH at phone: [phone], Monday-Friday 8-4:30pm.    I look forward to working with you and your doctors to make sure your medications work well for you.    Sincerely,  Richa Nobles RPH  [facility] Pharmacist, [facility]

## 2025-05-01 NOTE — LETTER
_  Medication List        Prepared on: May 1, 2025     Bring your Medication List when you go to the doctor, hospital, or   emergency room. And, share it with your family or caregivers.     Note any changes to how you take your medications.  Cross out medications when you no longer use them.    Medication How I take it Why I use it Prescriber   alendronate (FOSAMAX) 70 MG tablet Take 1 tablet (70 mg) by mouth every 7 days. Profile Rx: patient will contact pharmacy when needed Age-related osteoporosis without current pathological fracture Meri Pacheco MD   amLODIPine (NORVASC) 5 MG tablet Take 1 tablet (5 mg) by mouth daily. Essential Hypertension Meri Pacheco MD   Ascorbic Acid (VITAMIN C) 500 MG CAPS Take 1 capsule by mouth daily. general health Patient Reported   atorvastatin (LIPITOR) 20 MG tablet Take 1 tablet (20 mg) by mouth once daily Pure Hypercholesterolemia Meri Pacheco MD   Calcium Carbonate (CALCIUM 500 PO) Take 500 mg by mouth 2 times daily.  Osteoporosis  Patient Reported   DULoxetine (CYMBALTA) 30 MG capsule Take 1 capsule (30 mg) by mouth daily. Polyneuropathy; Lumbar Radicular Pain Serafin Mccain DO   estradiol (ESTRACE) 0.1 MG/GM vaginal cream Place 2 g vaginally twice a week. Urinary Urgency Meri Pacheco MD   levothyroxine (SYNTHROID/LEVOTHROID) 75 MCG tablet TAKE ONE TABLET BY MOUTH ONE TIME DAILY Hypothyroidism due to Hashimoto's thyroiditis Meri Pacheco MD   lisinopril (ZESTRIL) 40 MG tablet Take 1 tablet (40 mg) by mouth daily. Essential Hypertension Meri Pacheco MD   MAGNESIUM PO Take 1 tablet by mouth daily. Osteoporosis; Pain Patient Reported   Multiple Vitamins-Minerals (MULTIVITAMIN PO)  Take 1 tablet by mouth daily  General health  Patient Reported   pregabalin (LYRICA) 25 MG capsule Take 1 CAPSULE (25 mg) BY MOUTH in the morning, 1 CAPSULE (25 mg) BY MOUTH IN the afternoon, and 2 CAPSULES (50 mg) BY MOUTH at bedtime Polyneuropathy; Lumbar Radicular Pain Serafin Mccain  DO   primidone (MYSOLINE) 250 MG tablet Take 1 tablet (250 mg) by mouth at bedtime. Essential Tremor Meri Pacheco MD   Vitamin D, Cholecalciferol, 25 MCG (1000 UT) TABS Take 1 tablet by mouth every other day.  Osteoporosis  Patient Reported         Add new medications, over-the-counter drugs, herbals, vitamins, or  minerals in the blank rows below.    Medication How I take it Why I use it Prescriber                                      Allergies:      - No Known Allergies        Side effects I have had:      Not on File        Other Information:              My notes and questions:

## 2025-05-01 NOTE — PROGRESS NOTES
Medication Therapy Management (MTM) Encounter    ASSESSMENT:                            Medication Adherence/Access: No issues identified.    Hyperlipidemia   ASCVD prevention  Due to age (> 70) recommend discontinue aspirin for primary prevention due to greater concern for bleed which generally outweighs benefit at this time.     Hypertension   Last blood pressure not at goal, patient reports due to being in pain. Recommend she send home blood pressure readings to me.      Hypothyroidism   Last TSH within normal limit. Although taken with supplements that can bind/interact, suggest she stay consistent with dosing and if level continues to stable should be reasonable. Preferable over adding another time of the day for her to take medications, increasing risk of adherence issues.     Bone Health   Osteoporosis and supplements  Unfortunately able to review DEXA for BMD changes to determine any concern for treatment failure.   Splitting calcium may help improve absorption.     Pain    Due to missing Lyrica midday dose and no fatigue/sedation from daytime Lyrica she could try taking larger dose in the morning. Lyrica can be taken twice daily vs 3 times daily.      Genitourinary Symptoms/urinary urgency  Reviewed use although patient is aware is for prevention.    Tremor  Stable.    PLAN:                            Stop aspirin.     You can take vitamin D at the same time as calcium. Does not need to be take on its own.   Split calcium into twice daily dosing.    Lyrica can be taken twice daily you could consider Lyrica 50 mg in the morning and 25 mg at bedtime to avoid afternoon dosing, just do not exceed recommended max dose per RX.     DEXA is not back to review yet.   Patient to send home blood pressure readings via [employer].    Follow-up: Return if symptoms worsen or fail to improve, for Medication Therapy Management Visit.    SUBJECTIVE/OBJECTIVE:                          Mily Hilliard is a 74 year old female seen  for an initial visit. She was referred to me from [employer] [facility] [employer].      Reason for visit: no questions.    Allergies/ADRs: Reviewed in chart  Past Medical History: Reviewed in chart  Tobacco: She reports that she quit smoking about 22 years ago. Her smoking use included cigarettes. She has never been exposed to tobacco smoke. She has never used smokeless tobacco.  Alcohol: not currently using    Medication Adherence/Access:   Patient takes medications directly from bottles. Lined up by timing.   Patient takes medications 3 time(s) per day.   Per patient, misses medication 0 time(s) per week usually. She might miss Lyrica in the midday dose.     Hyperlipidemia   ASCVD prevention  Aspirin 81 mg daily   Atorvastation 20 mg daily     Patient reports no significant myalgias or other side effects.       Hypertension   Amlodipine 5 mg daily morning  Lisinopril 40 mg daily morning     Patient reports no current medication side effects  Patient self monitors blood pressure.  Home BP monitoring have been good, unable to get to blood pressure monitor to share specific readings .       Hypothyroidism   Levothyroxine 75 mcg daily - with the other morning medications, waits 60 minutes before eating    Patient is having the following symptoms: none.        Bone Health   Osteoporosis and supplements  Multivitamin 1 tablet in the morning   Vitamin D 1000 units every other day in the afternoon (reduce after recent elevated lab)  Calcium 500 mg tablet, 2 tabs/1000 mg daily, [employer] morning   Magnesium unknown in the afternoon   Vitamin C 500 mg daily  for general health   Alendronate 70 mg weekly (since 7/2022)    Patient is experiencing side effects.  DEXA History: repeated 4/29/2025 but report not complete yet for review today       Pain    Lyrica 25mg in the morning, 25 mg every noon, 50 mg in the evening per rx, but she reports only takes 25 mg at bedtime   Duloxetine 30 mg daily evening     Managed by Dr. Serafin Mccain. She  reports provider mentions she could take higher dose but she doesn't feel she has needed the higher at bedtime dose.   She reports when she sits too long then her back pain will be a lot.   She does notice worse pain if misses Lyrica.   Patient reports the following side effects: none.    Genitourinary Symptoms/urinary urgency  Estradiol vaginal cream twice a week - not taking    Last UTI 2/13/25 treated w/ Macrobid. She thinks her colonoscopy brought on the UTI's.   No symptoms currently.     Tremor  Primidone 250 mg at bedtime - takes in the morning.    No side effects. She think it helps. She reports tremor makes writing very challenging and she really enjoys writing.  has to write for her. She saw you can get a weighted glove so thinks she may try that.     Today's Vitals: LMP  (LMP Unknown)   ----------------      I spent 30 minutes with this patient today. All changes were made via collaborative practice agreement with Meri Collado MD.     A summary of these recommendations was sent via [employer].    Richa Nobles PharmD  Medication Therapy Management Pharmacist        Telemedicine Visit Details  The patient's medications can be safely assessed via a telemedicine encounter.  Type of service:  Telephone visit  Originating Location (pt. Location): Home    Distant Location (provider location):  Off-site  Start Time:  1:33p  End Time:  2:03p     Medication Therapy Recommendations  Age-related osteoporosis with current pathological fracture with routine healing   1 Current Medication: Calcium Carbonate (CALCIUM 500 PO)   Current Medication Sig: Take 500 mg by mouth 2 times daily.   Rationale: Dose too low - Dosage too low - Effectiveness   Recommendation: Increase Frequency   Status: Patient Agreed - Adherence/Education   Identified Date: 5/1/2025 Completed Date: 5/1/2025         Aspirin long-term use   1 Current Medication: aspirin (ASA) 81 MG chewable tablet (Discontinued)   Current Medication Sig: Take  81 mg by mouth daily   Rationale: Unsafe medication for the patient - Adverse medication event - Safety   Recommendation: Discontinue Medication   Status: Accepted - no CPA Needed   Identified Date: 5/1/2025 Completed Date: 5/1/2025         Lumbar pain   1 Current Medication: pregabalin (LYRICA) 25 MG capsule   Current Medication Sig: Take 1 CAPSULE (25 mg) BY MOUTH in the morning, 1 CAPSULE (25 mg) BY MOUTH IN the afternoon, and 2 CAPSULES (50 mg) BY MOUTH at bedtime   Rationale: Patient forgets to take - Adherence - Adherence   Recommendation: Provide Education   Status: Patient Agreed - Adherence/Education   Identified Date: 5/1/2025 Completed Date: 5/1/2025

## 2025-05-02 NOTE — PATIENT INSTRUCTIONS
"Recommendation(s) from today's visit:                                                      Stop aspirin.     You can take vitamin D at the same time as calcium. Does not need to be take on its own.   Split calcium into twice daily dosing.    Lyrica can be taken twice daily you could consider Lyrica 50 mg in the morning and 25 mg at bedtime to avoid afternoon dosing, just do not exceed recommended max dose per RX.     DEXA is not back to review yet.   send home blood pressure readings via [employer].    Follow-up: Return if symptoms worsen or fail to improve, for Medication Therapy Management Visit.    My Clinical Pharmacist's contact information:                                                      Richa Nobles, PharmD  Medication therapy management clinical pharmacist    It was great speaking with you today.  I value your experience and would be very thankful for your time in providing feedback in our clinic survey. In the next few days, you may receive an email or text message from [employer] with a link to a survey related to your  clinical pharmacist.\"     To schedule another MTM appointment, please call the clinic directly [phone] or you may call the MTM scheduling line at [phone].    "

## 2025-05-07 ENCOUNTER — MYC MEDICAL ADVICE (OUTPATIENT)
Dept: PEDIATRICS | Facility: CLINIC | Age: 75
End: 2025-05-07
Payer: COMMERCIAL

## 2025-05-07 NOTE — TELEPHONE ENCOUNTER
Called Kitty Anders [phone] and left VM to call back about bone density results are not signed yet/no results to read.     Upon call back:  - please inquire about why results have not been signed yet.     Kalani Chavez RN

## 2025-05-08 NOTE — TELEPHONE ENCOUNTER
Sent a [employer] message to the patient   - Informed the patient of Dr. Pacheco's comments/recommendations     Postponing to 5/12/2025. Were the patient's Dexa Bone Density Scan results released?     Gali SEPULVEDA RN   [facility]

## 2025-05-08 NOTE — TELEPHONE ENCOUNTER
Received call back from Dexa tech. Still waiting for radiologist to read dexa.  Shereen Ferrera LPN

## 2025-05-08 NOTE — TELEPHONE ENCOUNTER
Huddled with Kitty, DEXA seems to be resulted from their end. PACS images are in result for provider review. Please review and advise on DEXA and follow up.     Ignacio ESTES RN 5/8/2025 at 8:09 AM

## 2025-05-08 NOTE — TELEPHONE ENCOUNTER
My guess is that these are queued and the radiologist or endocrinologist reads them in batches, and if someone who reads DEXAs is out on vacation it may take a little longer for them to get read. If we still don't have a read in a week can the patient reach back out to us?    Meri Pacheco MD  Internal Medicine-Pediatrics

## 2025-05-12 NOTE — TELEPHONE ENCOUNTER
Upon chart review:   - Patient's 4/29/2025 DX Bone Density Scan results are not released at this time     Postponing to 5/15/2025. Were the patient's Dexa Bone Density Scan results released?     Gali SEPULVEDA RN   [facility]

## 2025-05-15 ENCOUNTER — MYC REFILL (OUTPATIENT)
Dept: PHYSICAL MEDICINE AND REHAB | Facility: CLINIC | Age: 75
End: 2025-05-15
Payer: COMMERCIAL

## 2025-05-15 ENCOUNTER — RESULTS FOLLOW-UP (OUTPATIENT)
Dept: PEDIATRICS | Facility: CLINIC | Age: 75
End: 2025-05-15

## 2025-05-15 DIAGNOSIS — G62.9 POLYNEUROPATHY: ICD-10-CM

## 2025-05-15 DIAGNOSIS — M54.16 LUMBAR RADICULAR PAIN: ICD-10-CM

## 2025-05-15 RX ORDER — PREGABALIN 25 MG/1
CAPSULE ORAL
Qty: 120 CAPSULE | Refills: 2 | Status: SHIPPED | OUTPATIENT
Start: 2025-05-15

## 2025-05-15 NOTE — TELEPHONE ENCOUNTER
Last appointment: 1/27/25  Next appointment: 8/6/25    Notes/Comments: 4/8/25 #120 with no refills      Rx request(s) has been reviewed.

## 2025-05-15 NOTE — TELEPHONE ENCOUNTER
"Upon chart review:   - Patient's 4/29/2025 DX Bone Density Scan   - 4/29/2025 DX Bone Density Scan results:    \"IMPRESSION: Low bone density (OSTEOPENIA). T score meets the WHO criteria for low bone density (osteopenia) at one or more measured sites. The risk of osteoporotic fracture increases approximately two-fold for each standard deviation decrease in T-score.     RECOMMENDATIONS  The current National Osteoporosis Foundation Guide recommends that FDA-approved medical therapies be considered if the 10 year probability of major osteoporotic fracture risk is greater than or equal to 20%, or if the hip fracture risk is greater than or   equal to 3%. Please note all treatment decisions require clinical judgement and consideration of individual patient factors, including patient preferences, comorbidities, previous drug use, risk factors not captured in the FRAX model (e.g. frailty,   falls, vitamin D deficiency, increased bone turnover, interval significant decline in bone density) and possible under or over-estimation of fracture risk by FRAX.\"    Dr. Pacheco, please review and advise.     Gali SEPULVEDA RN   [facility]   "

## 2025-05-26 ENCOUNTER — PATIENT OUTREACH (OUTPATIENT)
Dept: CARE COORDINATION | Facility: CLINIC | Age: 75
End: 2025-05-26
Payer: COMMERCIAL

## 2025-06-11 ENCOUNTER — PATIENT OUTREACH (OUTPATIENT)
Dept: CARE COORDINATION | Facility: CLINIC | Age: 75
End: 2025-06-11
Payer: COMMERCIAL

## 2025-06-27 ENCOUNTER — ANCILLARY PROCEDURE (OUTPATIENT)
Dept: MAMMOGRAPHY | Facility: CLINIC | Age: 75
End: 2025-06-27
Attending: INTERNAL MEDICINE
Payer: COMMERCIAL

## 2025-06-27 DIAGNOSIS — Z12.31 VISIT FOR SCREENING MAMMOGRAM: ICD-10-CM

## 2025-06-27 PROCEDURE — 77067 SCR MAMMO BI INCL CAD: CPT | Mod: TC | Performed by: RADIOLOGY

## 2025-06-27 PROCEDURE — 77063 BREAST TOMOSYNTHESIS BI: CPT | Mod: TC | Performed by: RADIOLOGY

## 2025-08-06 ENCOUNTER — OFFICE VISIT (OUTPATIENT)
Dept: PHYSICAL MEDICINE AND REHAB | Facility: CLINIC | Age: 75
End: 2025-08-06
Payer: COMMERCIAL

## 2025-08-06 VITALS — HEART RATE: 54 BPM | DIASTOLIC BLOOD PRESSURE: 60 MMHG | SYSTOLIC BLOOD PRESSURE: 125 MMHG

## 2025-08-06 DIAGNOSIS — M47.816 LUMBAR FACET ARTHROPATHY: ICD-10-CM

## 2025-08-06 DIAGNOSIS — R20.2 PARESTHESIA OF LEFT LEG: ICD-10-CM

## 2025-08-06 DIAGNOSIS — M41.25 OTHER IDIOPATHIC SCOLIOSIS, THORACOLUMBAR REGION: ICD-10-CM

## 2025-08-06 DIAGNOSIS — M43.8X9 SAGITTAL PLANE IMBALANCE: ICD-10-CM

## 2025-08-06 DIAGNOSIS — M48.061 FORAMINAL STENOSIS OF LUMBAR REGION: ICD-10-CM

## 2025-08-06 DIAGNOSIS — G62.9 POLYNEUROPATHY: ICD-10-CM

## 2025-08-06 DIAGNOSIS — R25.2 CRAMPS OF LEFT LOWER EXTREMITY: Primary | ICD-10-CM

## 2025-08-06 PROCEDURE — 1125F AMNT PAIN NOTED PAIN PRSNT: CPT | Performed by: PHYSICAL MEDICINE & REHABILITATION

## 2025-08-06 PROCEDURE — 3074F SYST BP LT 130 MM HG: CPT | Performed by: PHYSICAL MEDICINE & REHABILITATION

## 2025-08-06 PROCEDURE — 3078F DIAST BP <80 MM HG: CPT | Performed by: PHYSICAL MEDICINE & REHABILITATION

## 2025-08-06 PROCEDURE — 99214 OFFICE O/P EST MOD 30 MIN: CPT | Performed by: PHYSICAL MEDICINE & REHABILITATION

## 2025-08-06 PROCEDURE — G2211 COMPLEX E/M VISIT ADD ON: HCPCS | Performed by: PHYSICAL MEDICINE & REHABILITATION

## 2025-08-06 RX ORDER — METHYLPREDNISOLONE 4 MG/1
TABLET ORAL
Qty: 21 TABLET | Refills: 0 | Status: SHIPPED | OUTPATIENT
Start: 2025-08-06

## 2025-08-06 ASSESSMENT — PAIN SCALES - GENERAL: PAINLEVEL_OUTOF10: MODERATE PAIN (5)

## 2025-08-12 ENCOUNTER — ANCILLARY PROCEDURE (OUTPATIENT)
Dept: GENERAL RADIOLOGY | Facility: CLINIC | Age: 75
End: 2025-08-12
Attending: PHYSICAL MEDICINE & REHABILITATION
Payer: COMMERCIAL

## 2025-08-12 DIAGNOSIS — R25.2 CRAMPS OF LEFT LOWER EXTREMITY: ICD-10-CM

## 2025-08-12 DIAGNOSIS — R20.2 PARESTHESIA OF LEFT LEG: ICD-10-CM

## 2025-08-12 PROCEDURE — 72100 X-RAY EXAM L-S SPINE 2/3 VWS: CPT | Mod: TC | Performed by: STUDENT IN AN ORGANIZED HEALTH CARE EDUCATION/TRAINING PROGRAM

## 2025-08-20 DIAGNOSIS — M54.16 LUMBAR RADICULAR PAIN: ICD-10-CM

## 2025-08-20 DIAGNOSIS — G62.9 POLYNEUROPATHY: ICD-10-CM

## 2025-08-20 RX ORDER — DULOXETIN HYDROCHLORIDE 30 MG/1
30 CAPSULE, DELAYED RELEASE ORAL DAILY
Qty: 90 CAPSULE | Refills: 0 | Status: SHIPPED | OUTPATIENT
Start: 2025-08-20

## 2025-08-25 ENCOUNTER — OFFICE VISIT (OUTPATIENT)
Dept: PHYSICAL MEDICINE AND REHAB | Facility: CLINIC | Age: 75
End: 2025-08-25
Attending: PHYSICAL MEDICINE & REHABILITATION
Payer: COMMERCIAL

## 2025-08-25 VITALS — SYSTOLIC BLOOD PRESSURE: 166 MMHG | DIASTOLIC BLOOD PRESSURE: 66 MMHG | HEART RATE: 57 BPM

## 2025-08-25 DIAGNOSIS — M48.061 FORAMINAL STENOSIS OF LUMBAR REGION: ICD-10-CM

## 2025-08-25 DIAGNOSIS — M79.18 MYOFASCIAL PAIN: ICD-10-CM

## 2025-08-25 DIAGNOSIS — R20.2 PARESTHESIA OF LEFT LEG: ICD-10-CM

## 2025-08-25 DIAGNOSIS — M41.25 OTHER IDIOPATHIC SCOLIOSIS, THORACOLUMBAR REGION: ICD-10-CM

## 2025-08-25 DIAGNOSIS — R25.2 CRAMPS OF LEFT LOWER EXTREMITY: Primary | ICD-10-CM

## 2025-08-25 DIAGNOSIS — M54.6 PAIN IN THORACIC SPINE: ICD-10-CM

## 2025-08-25 DIAGNOSIS — M47.816 LUMBAR FACET ARTHROPATHY: ICD-10-CM

## 2025-08-25 DIAGNOSIS — M43.8X9 SAGITTAL PLANE IMBALANCE: ICD-10-CM

## 2025-08-25 DIAGNOSIS — M54.50 LUMBAR SPINE PAIN: ICD-10-CM

## 2025-08-25 DIAGNOSIS — G62.9 POLYNEUROPATHY: ICD-10-CM

## 2025-08-25 DIAGNOSIS — M54.16 LUMBAR RADICULAR PAIN: ICD-10-CM

## 2025-08-25 PROCEDURE — 3078F DIAST BP <80 MM HG: CPT | Performed by: PHYSICAL MEDICINE & REHABILITATION

## 2025-08-25 PROCEDURE — 99214 OFFICE O/P EST MOD 30 MIN: CPT | Mod: 25 | Performed by: PHYSICAL MEDICINE & REHABILITATION

## 2025-08-25 PROCEDURE — 3077F SYST BP >= 140 MM HG: CPT | Performed by: PHYSICAL MEDICINE & REHABILITATION

## 2025-08-25 PROCEDURE — 95909 NRV CNDJ TST 5-6 STUDIES: CPT | Performed by: PHYSICAL MEDICINE & REHABILITATION

## 2025-08-25 PROCEDURE — 95886 MUSC TEST DONE W/N TEST COMP: CPT | Mod: LT | Performed by: PHYSICAL MEDICINE & REHABILITATION

## 2025-08-25 PROCEDURE — 1125F AMNT PAIN NOTED PAIN PRSNT: CPT | Performed by: PHYSICAL MEDICINE & REHABILITATION

## 2025-08-25 RX ORDER — MELOXICAM 7.5 MG/1
7.5 TABLET ORAL DAILY PRN
Qty: 30 TABLET | Refills: 1 | Status: SHIPPED | OUTPATIENT
Start: 2025-08-25

## 2025-08-25 ASSESSMENT — PAIN SCALES - GENERAL: PAINLEVEL_OUTOF10: MODERATE PAIN (5)

## (undated) DEVICE — KIT ENDO TURNOVER/PROCEDURE W/CLEAN A SCOPE LINERS 103888

## (undated) RX ORDER — FENTANYL CITRATE 50 UG/ML
INJECTION, SOLUTION INTRAMUSCULAR; INTRAVENOUS
Status: DISPENSED
Start: 2019-12-04

## (undated) RX ORDER — FENTANYL CITRATE 50 UG/ML
INJECTION, SOLUTION INTRAMUSCULAR; INTRAVENOUS
Status: DISPENSED
Start: 2024-12-11